# Patient Record
Sex: MALE | Race: WHITE | URBAN - METROPOLITAN AREA
[De-identification: names, ages, dates, MRNs, and addresses within clinical notes are randomized per-mention and may not be internally consistent; named-entity substitution may affect disease eponyms.]

---

## 2017-05-22 ENCOUNTER — EMERGENCY (EMERGENCY)
Facility: HOSPITAL | Age: 52
LOS: 0 days | Discharge: HOME | End: 2017-05-22

## 2017-06-28 DIAGNOSIS — R10.32 LEFT LOWER QUADRANT PAIN: ICD-10-CM

## 2017-06-28 DIAGNOSIS — N20.0 CALCULUS OF KIDNEY: ICD-10-CM

## 2017-06-28 DIAGNOSIS — Z79.899 OTHER LONG TERM (CURRENT) DRUG THERAPY: ICD-10-CM

## 2017-06-28 DIAGNOSIS — K21.9 GASTRO-ESOPHAGEAL REFLUX DISEASE WITHOUT ESOPHAGITIS: ICD-10-CM

## 2017-06-28 DIAGNOSIS — N23 UNSPECIFIED RENAL COLIC: ICD-10-CM

## 2018-07-26 ENCOUNTER — TRANSCRIPTION ENCOUNTER (OUTPATIENT)
Age: 53
End: 2018-07-26

## 2019-09-08 ENCOUNTER — INPATIENT (INPATIENT)
Facility: HOSPITAL | Age: 54
LOS: 1 days | Discharge: HOME | End: 2019-09-10
Attending: SURGERY | Admitting: SURGERY
Payer: COMMERCIAL

## 2019-09-08 VITALS
DIASTOLIC BLOOD PRESSURE: 137 MMHG | OXYGEN SATURATION: 98 % | TEMPERATURE: 97 F | SYSTOLIC BLOOD PRESSURE: 232 MMHG | HEART RATE: 88 BPM | RESPIRATION RATE: 18 BRPM | WEIGHT: 279.99 LBS

## 2019-09-08 DIAGNOSIS — Z90.89 ACQUIRED ABSENCE OF OTHER ORGANS: Chronic | ICD-10-CM

## 2019-09-08 DIAGNOSIS — Z98.890 OTHER SPECIFIED POSTPROCEDURAL STATES: Chronic | ICD-10-CM

## 2019-09-08 LAB
ALBUMIN SERPL ELPH-MCNC: 4.6 G/DL — SIGNIFICANT CHANGE UP (ref 3.5–5.2)
ALP SERPL-CCNC: 51 U/L — SIGNIFICANT CHANGE UP (ref 30–115)
ALT FLD-CCNC: 15 U/L — SIGNIFICANT CHANGE UP (ref 0–41)
ANION GAP SERPL CALC-SCNC: 12 MMOL/L — SIGNIFICANT CHANGE UP (ref 7–14)
APTT BLD: 33.1 SEC — SIGNIFICANT CHANGE UP (ref 27–39.2)
AST SERPL-CCNC: 13 U/L — SIGNIFICANT CHANGE UP (ref 0–41)
BASOPHILS # BLD AUTO: 0.08 K/UL — SIGNIFICANT CHANGE UP (ref 0–0.2)
BASOPHILS NFR BLD AUTO: 0.7 % — SIGNIFICANT CHANGE UP (ref 0–1)
BILIRUB DIRECT SERPL-MCNC: <0.2 MG/DL — SIGNIFICANT CHANGE UP (ref 0–0.2)
BILIRUB INDIRECT FLD-MCNC: SIGNIFICANT CHANGE UP MG/DL (ref 0.2–1.2)
BILIRUB SERPL-MCNC: <0.2 MG/DL — SIGNIFICANT CHANGE UP (ref 0.2–1.2)
BUN SERPL-MCNC: 15 MG/DL — SIGNIFICANT CHANGE UP (ref 10–20)
CALCIUM SERPL-MCNC: 9.5 MG/DL — SIGNIFICANT CHANGE UP (ref 8.5–10.1)
CHLORIDE SERPL-SCNC: 103 MMOL/L — SIGNIFICANT CHANGE UP (ref 98–110)
CO2 SERPL-SCNC: 27 MMOL/L — SIGNIFICANT CHANGE UP (ref 17–32)
CREAT SERPL-MCNC: 1.3 MG/DL — SIGNIFICANT CHANGE UP (ref 0.7–1.5)
EOSINOPHIL # BLD AUTO: 0.52 K/UL — SIGNIFICANT CHANGE UP (ref 0–0.7)
EOSINOPHIL NFR BLD AUTO: 4.8 % — SIGNIFICANT CHANGE UP (ref 0–8)
GLUCOSE SERPL-MCNC: 119 MG/DL — HIGH (ref 70–99)
HCT VFR BLD CALC: 46.4 % — SIGNIFICANT CHANGE UP (ref 42–52)
HGB BLD-MCNC: 15.9 G/DL — SIGNIFICANT CHANGE UP (ref 14–18)
IMM GRANULOCYTES NFR BLD AUTO: 0.2 % — SIGNIFICANT CHANGE UP (ref 0.1–0.3)
INR BLD: 1.07 RATIO — SIGNIFICANT CHANGE UP (ref 0.65–1.3)
LACTATE SERPL-SCNC: 1.5 MMOL/L — SIGNIFICANT CHANGE UP (ref 0.5–2.2)
LIDOCAIN IGE QN: 31 U/L — SIGNIFICANT CHANGE UP (ref 7–60)
LYMPHOCYTES # BLD AUTO: 3.8 K/UL — HIGH (ref 1.2–3.4)
LYMPHOCYTES # BLD AUTO: 35.4 % — SIGNIFICANT CHANGE UP (ref 20.5–51.1)
MCHC RBC-ENTMCNC: 29.7 PG — SIGNIFICANT CHANGE UP (ref 27–31)
MCHC RBC-ENTMCNC: 34.3 G/DL — SIGNIFICANT CHANGE UP (ref 32–37)
MCV RBC AUTO: 86.7 FL — SIGNIFICANT CHANGE UP (ref 80–94)
MONOCYTES # BLD AUTO: 0.95 K/UL — HIGH (ref 0.1–0.6)
MONOCYTES NFR BLD AUTO: 8.8 % — SIGNIFICANT CHANGE UP (ref 1.7–9.3)
NEUTROPHILS # BLD AUTO: 5.37 K/UL — SIGNIFICANT CHANGE UP (ref 1.4–6.5)
NEUTROPHILS NFR BLD AUTO: 50.1 % — SIGNIFICANT CHANGE UP (ref 42.2–75.2)
NRBC # BLD: 0 /100 WBCS — SIGNIFICANT CHANGE UP (ref 0–0)
PLATELET # BLD AUTO: 224 K/UL — SIGNIFICANT CHANGE UP (ref 130–400)
POTASSIUM SERPL-MCNC: 4.3 MMOL/L — SIGNIFICANT CHANGE UP (ref 3.5–5)
POTASSIUM SERPL-SCNC: 4.3 MMOL/L — SIGNIFICANT CHANGE UP (ref 3.5–5)
PROT SERPL-MCNC: 7.6 G/DL — SIGNIFICANT CHANGE UP (ref 6–8)
PROTHROM AB SERPL-ACNC: 12.3 SEC — SIGNIFICANT CHANGE UP (ref 9.95–12.87)
RBC # BLD: 5.35 M/UL — SIGNIFICANT CHANGE UP (ref 4.7–6.1)
RBC # FLD: 12.5 % — SIGNIFICANT CHANGE UP (ref 11.5–14.5)
SODIUM SERPL-SCNC: 142 MMOL/L — SIGNIFICANT CHANGE UP (ref 135–146)
WBC # BLD: 10.74 K/UL — SIGNIFICANT CHANGE UP (ref 4.8–10.8)
WBC # FLD AUTO: 10.74 K/UL — SIGNIFICANT CHANGE UP (ref 4.8–10.8)

## 2019-09-08 PROCEDURE — 71045 X-RAY EXAM CHEST 1 VIEW: CPT | Mod: 26

## 2019-09-08 PROCEDURE — 99285 EMERGENCY DEPT VISIT HI MDM: CPT

## 2019-09-08 PROCEDURE — 76705 ECHO EXAM OF ABDOMEN: CPT | Mod: 26

## 2019-09-08 RX ORDER — SODIUM CHLORIDE 9 MG/ML
1000 INJECTION INTRAMUSCULAR; INTRAVENOUS; SUBCUTANEOUS ONCE
Refills: 0 | Status: COMPLETED | OUTPATIENT
Start: 2019-09-08 | End: 2019-09-08

## 2019-09-08 RX ORDER — ONDANSETRON 8 MG/1
4 TABLET, FILM COATED ORAL ONCE
Refills: 0 | Status: COMPLETED | OUTPATIENT
Start: 2019-09-08 | End: 2019-09-08

## 2019-09-08 RX ORDER — MORPHINE SULFATE 50 MG/1
6 CAPSULE, EXTENDED RELEASE ORAL ONCE
Refills: 0 | Status: DISCONTINUED | OUTPATIENT
Start: 2019-09-08 | End: 2019-09-08

## 2019-09-08 RX ORDER — HYDROMORPHONE HYDROCHLORIDE 2 MG/ML
1 INJECTION INTRAMUSCULAR; INTRAVENOUS; SUBCUTANEOUS ONCE
Refills: 0 | Status: DISCONTINUED | OUTPATIENT
Start: 2019-09-08 | End: 2019-09-08

## 2019-09-08 RX ADMIN — MORPHINE SULFATE 6 MILLIGRAM(S): 50 CAPSULE, EXTENDED RELEASE ORAL at 22:35

## 2019-09-08 RX ADMIN — SODIUM CHLORIDE 1000 MILLILITER(S): 9 INJECTION INTRAMUSCULAR; INTRAVENOUS; SUBCUTANEOUS at 22:33

## 2019-09-08 RX ADMIN — HYDROMORPHONE HYDROCHLORIDE 1 MILLIGRAM(S): 2 INJECTION INTRAMUSCULAR; INTRAVENOUS; SUBCUTANEOUS at 23:22

## 2019-09-08 RX ADMIN — ONDANSETRON 4 MILLIGRAM(S): 8 TABLET, FILM COATED ORAL at 22:33

## 2019-09-08 NOTE — ED PROVIDER NOTE - NS ED ROS FT
Review of Systems  Constitutional:  No fever, chills.  Eyes:  No visual changes, eye pain, or discharge.  ENMT:  No hearing changes, pain, or discharge. No nasal congestion, discharge, or bleeding. No throat pain, swelling, or difficulty swallowing.  Cardiac:  No chest pain, palpitations, syncope, or edema.  Respiratory:  No dyspnea, cough. No hemoptysis.  GI:  No vomiting, diarrhea. No melena or hematochezia. (+) abd pain, nausea  :  No dysuria, hematuria, frequency, or burning.   MS:  No back pain.  Skin:  No skin rash, pruritis, jaundice, or lesions.  Neuro:  No headache, dizziness, loss of sensation, or focal weakness.  No change in mental status.   Endocrine: No history of thyroid disease or diabetes.

## 2019-09-08 NOTE — ED PROVIDER NOTE - PHYSICAL EXAMINATION
VITAL SIGNS: I have reviewed nursing notes and confirm.  CONSTITUTIONAL: Well-developed; well-nourished; in no acute distress.  SKIN: Skin exam is warm and dry, no acute rash.  HEAD: Normocephalic; atraumatic.  EYES: Conjunctiva and sclera clear.  ENT: No nasal discharge; airway clear.   NECK: Supple; non tender.  CARD: S1, S2 normal; no murmurs, gallops, or rubs. Regular rate and rhythm.  RESP: No wheezes, rales or rhonchi. Speaking in full sentences.   ABD: Normal bowel sounds; soft; non-distended; (+) RUQ TTP; No rebound or guarding. No CVA tenderness.  EXT: Normal ROM. No clubbing, cyanosis or edema.  NEURO: Alert, oriented. Grossly unremarkable. No focal deficits.

## 2019-09-08 NOTE — ED PROVIDER NOTE - CARE PLAN
Principal Discharge DX:	Abdominal pain  Secondary Diagnosis:	Cholelithiasis  Secondary Diagnosis:	Nausea

## 2019-09-08 NOTE — ED PROVIDER NOTE - CLINICAL SUMMARY MEDICAL DECISION MAKING FREE TEXT BOX
Labs unremarkable. EKG no acute changes. CXR negative. RUQ sono +gallstones, no wall thickening of pericholecystic fluid. Given IVF, morphine, Dilaudid with partial relief. Will admit. Surgery consulted.

## 2019-09-08 NOTE — ED PROVIDER NOTE - OBJECTIVE STATEMENT
55 yo M with PMHx of HTN, GERD and cholelithiasis presents to the ED c/o severe RUQ pain that started about 1 hour ago. Symptoms have been constant since onset. He admits to associated nausea. He denies aggravating/alleviating factors. Pt occasional drinker, denies eating heavy/fatty food prior to onset of symptoms. He had similar episode 2-3 years ago and was diagnosed with cholelithiasis--surgery was recommended but pt refused. He denies other complaints. Pt denies fever, chills, vomiting, diarrhea, headache, dizziness, weakness, chest pain, SOB, back pain, LOC, trauma, urinary symptoms, cough, calf pain/swelling, recent travel, recent surgery.

## 2019-09-08 NOTE — ED PROVIDER NOTE - ATTENDING CONTRIBUTION TO CARE
I personally evaluated the patient. I reviewed the Resident’s or Physician Assistant’s note (as assigned above), and agree with the findings and plan except as documented in my note.  Chart reviewed. H/O gallstones presents with RUQ pain and nausea for 1 week. No fever. Exam shows clear lungs, RR S1S2, abdomen soft +RUQ tenderness +BS, no rebound or guarding, no CCE.

## 2019-09-09 DIAGNOSIS — K80.20 CALCULUS OF GALLBLADDER WITHOUT CHOLECYSTITIS WITHOUT OBSTRUCTION: ICD-10-CM

## 2019-09-09 DIAGNOSIS — K21.9 GASTRO-ESOPHAGEAL REFLUX DISEASE WITHOUT ESOPHAGITIS: ICD-10-CM

## 2019-09-09 DIAGNOSIS — Z71.6 TOBACCO ABUSE COUNSELING: ICD-10-CM

## 2019-09-09 DIAGNOSIS — I10 ESSENTIAL (PRIMARY) HYPERTENSION: ICD-10-CM

## 2019-09-09 DIAGNOSIS — R10.13 EPIGASTRIC PAIN: ICD-10-CM

## 2019-09-09 LAB
ALBUMIN SERPL ELPH-MCNC: 4 G/DL — SIGNIFICANT CHANGE UP (ref 3.5–5.2)
ALP SERPL-CCNC: 58 U/L — SIGNIFICANT CHANGE UP (ref 30–115)
ALT FLD-CCNC: 41 U/L — SIGNIFICANT CHANGE UP (ref 0–41)
ANION GAP SERPL CALC-SCNC: 10 MMOL/L — SIGNIFICANT CHANGE UP (ref 7–14)
AST SERPL-CCNC: 35 U/L — SIGNIFICANT CHANGE UP (ref 0–41)
BILIRUB SERPL-MCNC: 0.4 MG/DL — SIGNIFICANT CHANGE UP (ref 0.2–1.2)
BUN SERPL-MCNC: 13 MG/DL — SIGNIFICANT CHANGE UP (ref 10–20)
CALCIUM SERPL-MCNC: 8.9 MG/DL — SIGNIFICANT CHANGE UP (ref 8.5–10.1)
CHLORIDE SERPL-SCNC: 104 MMOL/L — SIGNIFICANT CHANGE UP (ref 98–110)
CO2 SERPL-SCNC: 25 MMOL/L — SIGNIFICANT CHANGE UP (ref 17–32)
CREAT SERPL-MCNC: 1 MG/DL — SIGNIFICANT CHANGE UP (ref 0.7–1.5)
GLUCOSE SERPL-MCNC: 102 MG/DL — HIGH (ref 70–99)
HCT VFR BLD CALC: 44.1 % — SIGNIFICANT CHANGE UP (ref 42–52)
HGB BLD-MCNC: 14.9 G/DL — SIGNIFICANT CHANGE UP (ref 14–18)
LIDOCAIN IGE QN: 21 U/L — SIGNIFICANT CHANGE UP (ref 7–60)
MCHC RBC-ENTMCNC: 29.5 PG — SIGNIFICANT CHANGE UP (ref 27–31)
MCHC RBC-ENTMCNC: 33.8 G/DL — SIGNIFICANT CHANGE UP (ref 32–37)
MCV RBC AUTO: 87.3 FL — SIGNIFICANT CHANGE UP (ref 80–94)
NRBC # BLD: 0 /100 WBCS — SIGNIFICANT CHANGE UP (ref 0–0)
PLATELET # BLD AUTO: 192 K/UL — SIGNIFICANT CHANGE UP (ref 130–400)
POTASSIUM SERPL-MCNC: 4.2 MMOL/L — SIGNIFICANT CHANGE UP (ref 3.5–5)
POTASSIUM SERPL-SCNC: 4.2 MMOL/L — SIGNIFICANT CHANGE UP (ref 3.5–5)
PROT SERPL-MCNC: 6.8 G/DL — SIGNIFICANT CHANGE UP (ref 6–8)
RBC # BLD: 5.05 M/UL — SIGNIFICANT CHANGE UP (ref 4.7–6.1)
RBC # FLD: 12.7 % — SIGNIFICANT CHANGE UP (ref 11.5–14.5)
SODIUM SERPL-SCNC: 139 MMOL/L — SIGNIFICANT CHANGE UP (ref 135–146)
WBC # BLD: 7.88 K/UL — SIGNIFICANT CHANGE UP (ref 4.8–10.8)
WBC # FLD AUTO: 7.88 K/UL — SIGNIFICANT CHANGE UP (ref 4.8–10.8)

## 2019-09-09 PROCEDURE — 99223 1ST HOSP IP/OBS HIGH 75: CPT | Mod: AI

## 2019-09-09 PROCEDURE — 88304 TISSUE EXAM BY PATHOLOGIST: CPT | Mod: 26

## 2019-09-09 RX ORDER — NICOTINE POLACRILEX 2 MG
1 GUM BUCCAL DAILY
Refills: 0 | Status: DISCONTINUED | OUTPATIENT
Start: 2019-09-09 | End: 2019-09-10

## 2019-09-09 RX ORDER — HEPARIN SODIUM 5000 [USP'U]/ML
5000 INJECTION INTRAVENOUS; SUBCUTANEOUS EVERY 8 HOURS
Refills: 0 | Status: DISCONTINUED | OUTPATIENT
Start: 2019-09-09 | End: 2019-09-10

## 2019-09-09 RX ORDER — ONDANSETRON 8 MG/1
4 TABLET, FILM COATED ORAL ONCE
Refills: 0 | Status: COMPLETED | OUTPATIENT
Start: 2019-09-09 | End: 2019-09-09

## 2019-09-09 RX ORDER — OXYCODONE AND ACETAMINOPHEN 5; 325 MG/1; MG/1
1 TABLET ORAL ONCE
Refills: 0 | Status: DISCONTINUED | OUTPATIENT
Start: 2019-09-09 | End: 2019-09-09

## 2019-09-09 RX ORDER — ONDANSETRON 8 MG/1
4 TABLET, FILM COATED ORAL
Refills: 0 | Status: DISCONTINUED | OUTPATIENT
Start: 2019-09-09 | End: 2019-09-10

## 2019-09-09 RX ORDER — SODIUM CHLORIDE 9 MG/ML
1000 INJECTION, SOLUTION INTRAVENOUS
Refills: 0 | Status: DISCONTINUED | OUTPATIENT
Start: 2019-09-09 | End: 2019-09-09

## 2019-09-09 RX ORDER — NICOTINE POLACRILEX 2 MG
1 GUM BUCCAL DAILY
Refills: 0 | Status: DISCONTINUED | OUTPATIENT
Start: 2019-09-09 | End: 2019-09-09

## 2019-09-09 RX ORDER — LOSARTAN POTASSIUM 100 MG/1
25 TABLET, FILM COATED ORAL DAILY
Refills: 0 | Status: DISCONTINUED | OUTPATIENT
Start: 2019-09-09 | End: 2019-09-09

## 2019-09-09 RX ORDER — LANSOPRAZOLE 15 MG/1
1 CAPSULE, DELAYED RELEASE ORAL
Qty: 0 | Refills: 0 | DISCHARGE

## 2019-09-09 RX ORDER — HYDROMORPHONE HYDROCHLORIDE 2 MG/ML
1 INJECTION INTRAMUSCULAR; INTRAVENOUS; SUBCUTANEOUS
Refills: 0 | Status: DISCONTINUED | OUTPATIENT
Start: 2019-09-09 | End: 2019-09-10

## 2019-09-09 RX ORDER — ACETAMINOPHEN 500 MG
650 TABLET ORAL EVERY 6 HOURS
Refills: 0 | Status: DISCONTINUED | OUTPATIENT
Start: 2019-09-09 | End: 2019-09-10

## 2019-09-09 RX ORDER — MORPHINE SULFATE 50 MG/1
4 CAPSULE, EXTENDED RELEASE ORAL
Refills: 0 | Status: DISCONTINUED | OUTPATIENT
Start: 2019-09-09 | End: 2019-09-09

## 2019-09-09 RX ORDER — ONDANSETRON 8 MG/1
4 TABLET, FILM COATED ORAL
Refills: 0 | Status: DISCONTINUED | OUTPATIENT
Start: 2019-09-09 | End: 2019-09-09

## 2019-09-09 RX ORDER — PANTOPRAZOLE SODIUM 20 MG/1
40 TABLET, DELAYED RELEASE ORAL
Refills: 0 | Status: DISCONTINUED | OUTPATIENT
Start: 2019-09-09 | End: 2019-09-09

## 2019-09-09 RX ORDER — CEFOTETAN DISODIUM 1 G
1 VIAL (EA) INJECTION EVERY 12 HOURS
Refills: 0 | Status: DISCONTINUED | OUTPATIENT
Start: 2019-09-09 | End: 2019-09-10

## 2019-09-09 RX ORDER — OXYCODONE HYDROCHLORIDE 5 MG/1
5 TABLET ORAL EVERY 6 HOURS
Refills: 0 | Status: DISCONTINUED | OUTPATIENT
Start: 2019-09-09 | End: 2019-09-10

## 2019-09-09 RX ORDER — LOSARTAN POTASSIUM 100 MG/1
25 TABLET, FILM COATED ORAL DAILY
Refills: 0 | Status: DISCONTINUED | OUTPATIENT
Start: 2019-09-09 | End: 2019-09-10

## 2019-09-09 RX ORDER — PANTOPRAZOLE SODIUM 20 MG/1
40 TABLET, DELAYED RELEASE ORAL
Refills: 0 | Status: DISCONTINUED | OUTPATIENT
Start: 2019-09-09 | End: 2019-09-10

## 2019-09-09 RX ORDER — OLMESARTAN MEDOXOMIL 5 MG/1
0 TABLET, FILM COATED ORAL
Qty: 0 | Refills: 0 | DISCHARGE

## 2019-09-09 RX ORDER — SODIUM CHLORIDE 9 MG/ML
1000 INJECTION INTRAMUSCULAR; INTRAVENOUS; SUBCUTANEOUS
Refills: 0 | Status: DISCONTINUED | OUTPATIENT
Start: 2019-09-09 | End: 2019-09-09

## 2019-09-09 RX ORDER — IBUPROFEN 200 MG
400 TABLET ORAL EVERY 8 HOURS
Refills: 0 | Status: DISCONTINUED | OUTPATIENT
Start: 2019-09-09 | End: 2019-09-10

## 2019-09-09 RX ORDER — INFLUENZA VIRUS VACCINE 15; 15; 15; 15 UG/.5ML; UG/.5ML; UG/.5ML; UG/.5ML
0.5 SUSPENSION INTRAMUSCULAR ONCE
Refills: 0 | Status: COMPLETED | OUTPATIENT
Start: 2019-09-09 | End: 2019-09-09

## 2019-09-09 RX ORDER — SODIUM CHLORIDE 9 MG/ML
1000 INJECTION, SOLUTION INTRAVENOUS
Refills: 0 | Status: DISCONTINUED | OUTPATIENT
Start: 2019-09-09 | End: 2019-09-10

## 2019-09-09 RX ORDER — HYDROMORPHONE HYDROCHLORIDE 2 MG/ML
1 INJECTION INTRAMUSCULAR; INTRAVENOUS; SUBCUTANEOUS EVERY 4 HOURS
Refills: 0 | Status: DISCONTINUED | OUTPATIENT
Start: 2019-09-09 | End: 2019-09-09

## 2019-09-09 RX ADMIN — SODIUM CHLORIDE 125 MILLILITER(S): 9 INJECTION, SOLUTION INTRAVENOUS at 17:47

## 2019-09-09 RX ADMIN — SODIUM CHLORIDE 75 MILLILITER(S): 9 INJECTION INTRAMUSCULAR; INTRAVENOUS; SUBCUTANEOUS at 06:06

## 2019-09-09 RX ADMIN — HYDROMORPHONE HYDROCHLORIDE 1 MILLIGRAM(S): 2 INJECTION INTRAMUSCULAR; INTRAVENOUS; SUBCUTANEOUS at 17:25

## 2019-09-09 RX ADMIN — Medication 1 PATCH: at 11:18

## 2019-09-09 RX ADMIN — PANTOPRAZOLE SODIUM 40 MILLIGRAM(S): 20 TABLET, DELAYED RELEASE ORAL at 06:04

## 2019-09-09 RX ADMIN — Medication 30 MILLILITER(S): at 08:49

## 2019-09-09 RX ADMIN — HYDROMORPHONE HYDROCHLORIDE 1 MILLIGRAM(S): 2 INJECTION INTRAMUSCULAR; INTRAVENOUS; SUBCUTANEOUS at 01:00

## 2019-09-09 RX ADMIN — ONDANSETRON 4 MILLIGRAM(S): 8 TABLET, FILM COATED ORAL at 00:58

## 2019-09-09 RX ADMIN — LOSARTAN POTASSIUM 25 MILLIGRAM(S): 100 TABLET, FILM COATED ORAL at 06:04

## 2019-09-09 NOTE — H&P ADULT - NSHPLABSRESULTS_GEN_ALL_CORE
< from: US Abdomen Limited (09.08.19 @ 22:56) >    EXAM:  US ABDOMEN LIMITED          PROCEDURE DATE:  09/08/2019      < from: US Abdomen Limited (09.08.19 @ 22:56) >    IMPRESSION:    Cholelithiasis without gallbladder wall thickening, pericholecystic fluid   or additional grayscale imaging findings to suggest acute cholecystitis.   However a positive sonographic Post sign was reported, if further   imaging is needed, a HIDA scan can be obtained.    MARCIAL DAVENPORT, RESIDENT RADIOLOGIST  This document has been electronically signed.  MONTY ORNELAS M.D., ATTENDING RADIOLOGIST  This document has been electronically signed. Sep  8 2019 11:23PM      < end of copied text >                        15.9   10.74 )-----------( 224      ( 08 Sep 2019 22:30 )             46.4     09-08    142  |  103  |  15  ----------------------------<  119<H>  4.3   |  27  |  1.3    Ca    9.5      08 Sep 2019 22:30    TPro  7.6  /  Alb  4.6  /  TBili  <0.2  /  DBili  <0.2  /  AST  13  /  ALT  15  /  AlkPhos  51  09-08            PT/INR - ( 08 Sep 2019 22:30 )   PT: 12.30 sec;   INR: 1.07 ratio         PTT - ( 08 Sep 2019 22:30 )  PTT:33.1 sec  Lactate Trend  09-08 @ 22:30 Lactate:1.5         CAPILLARY BLOOD GLUCOSE

## 2019-09-09 NOTE — PROGRESS NOTE ADULT - ATTENDING COMMENTS
pt seen and examined independently.  pt w/o chest pain or sob, good exercise tolerance.  EKG reviewed; NSR 1st AVB, 64bpm, inf Q.  labs, CXR, vitals reviewed  medically optimized for surgery

## 2019-09-09 NOTE — PROGRESS NOTE ADULT - SUBJECTIVE AND OBJECTIVE BOX
CHIEF COMPLAINT: 54yMale with PMH below presented to the hospital for abdominal pain    Interval course: No event overnight. Patient was able to sleep well. Pain is well controlled w/ PRN medication. Denies fever/chills, n/v/d, chest pain, SOB, headache/dizziness.     PAST MEDICAL & SURGICAL HISTORY:  Gallstones  Hypertension  H/O shoulder surgery  History of tonsillectomy      FAMILY HISTORY:  [+] no pertinent family history of premature cardiovascular disease in first degree relatives.      SOCIAL HISTORY:    [+] Smoker: 1 ppd X 40 years  [+] Alcohol: Socially     Allergies: NKDA    	    REVIEW OF SYSTEMS:  CONSTITUTIONAL: No fever, weight loss, or fatigue  CARDIOLOGY: Patient denies chest pain, shortness of breath or syncopal episodes.   RESPIRATORY: denies shortness of breath, wheezing.   NEUROLOGICAL: No weakness, no focal deficits to report.  GI: no BRBPR, no n/v/d    PSYCHIATRY: normal mood and affect  HEENT: no nasal discharge, no ecchymosis  SKIN: no ecchymosis, no breakdown  MUSCULOSKELETAL: Full range of motion x4.     PHYSICAL EXAM:  T(C): 36.2 (09-09-19 @ 05:49), Max: 36.3 (09-08-19 @ 22:22)  HR: 66 (09-09-19 @ 05:49) (60 - 88)  BP: 115/66 (09-09-19 @ 05:49) (115/66 - 232/137)  RR: 16 (09-09-19 @ 05:49) (16 - 20)  SpO2: 98% (09-08-19 @ 23:23) (98% - 98%)      General Appearance: well appearing, normal for age and gender. 	  Neck: normal JVP, no bruit.   Eyes: EOMI   Cardiovascular: Normal S1S2 no rubs, murmurs, or gallops  Respiratory: Lungs clear to auscultation balta. No wheezing, rales, or rhonchi	  Psychiatry: Alert and oriented x 3, Mood & affect appropriate  Gastrointestinal:  Soft, Non-tender  Skin/Integumen: No rashes, No ecchymoses, No cyanosis	  Neurologic: Non-focal  Musculoskeletal/ extremities: Normal range of motion, No clubbing, cyanosis or edema  Vascular: Peripheral pulses palpable 2+ bilaterally    LABS: 	                          14.9   7.88  )-----------( 192      ( 09 Sep 2019 08:33 )             44.1     09-09    139  |  104  |  13  ----------------------------<  102<H>  4.2   |  25  |  1.0    Ca    8.9      09 Sep 2019 08:33    TPro  6.8  /  Alb  4.0  /  TBili  0.4  /  DBili  x   /  AST  35  /  ALT  41  /  AlkPhos  58  09-09        PT/INR - ( 08 Sep 2019 22:30 )   PT: 12.30 sec;   INR: 1.07 ratio         PTT - ( 08 Sep 2019 22:30 )  PTT:33.1 sec  	      ECG:    RADIOLOGY:   < from: Xray Chest 1 View- PORTABLE-Urgent (09.08.19 @ 23:21) >  Impression:      Under aerated lungs, with right basilar opacity. Differential includes   but not limited to atelectasis.    < end of copied text >    < from: US Abdomen Limited (09.08.19 @ 22:56) >  IMPRESSION:    Cholelithiasis without gallbladder wall thickening, pericholecystic fluid   or additional grayscale imaging findings to suggest acute cholecystitis.   However a positive sonographic Post sign was reported, if further   imaging is needed, a HIDA scan can be obtained.    < end of copied text >  	  	    Home Medications:  Benicar:  (09 Sep 2019 02:36)  Prevacid 30 mg oral delayed release capsule: 1 cap(s) orally once a day (09 Sep 2019 02:36)    MEDICATIONS  (STANDING):  influenza   Vaccine 0.5 milliLiter(s) IntraMuscular once  losartan 25 milliGRAM(s) Oral daily  nicotine - 21 mG/24Hr(s) Patch 1 patch Transdermal daily  pantoprazole    Tablet 40 milliGRAM(s) Oral before breakfast  sodium chloride 0.9%. 1000 milliLiter(s) (75 mL/Hr) IV Continuous <Continuous>    MEDICATIONS  (PRN):  aluminum hydroxide/magnesium hydroxide/simethicone Suspension 30 milliLiter(s) Oral every 4 hours PRN Dyspepsia  HYDROmorphone  Injectable 1 milliGRAM(s) IV Push every 4 hours PRN Moderate Pain (4 - 6)  ondansetron Injectable 4 milliGRAM(s) IV Push four times a day PRN Nausea and/or Vomiting

## 2019-09-09 NOTE — CONSULT NOTE ADULT - SUBJECTIVE AND OBJECTIVE BOX
Chief Complaint: abdominal pain.    · Chief Complaint: The patient is a 54y Male complaining of abdominal pain.	  · HPI Objective Statement: 55 yo M with PMHx of HTN, GERD and cholelithiasis presents to the ED c/o severe RUQ pain that started about 1 hour ago. Symptoms have been constant since onset. He admits to associated nausea. He denies aggravating/alleviating factors. Pt occasional drinker, denies eating heavy/fatty food prior to onset of symptoms. He had similar episode 2-3 years ago and was diagnosed with cholelithiasis--surgery was recommended but pt refused. He denies other complaints. Pt denies fever, chills, vomiting, diarrhea, headache, dizziness, weakness, chest pain, SOB, back pain, LOC, trauma, urinary symptoms, cough, calf pain/swelling, recent travel, recent surgery.	    HIV:    HIV Status:  · Offered: Declined	    PAST MEDICAL/SURGICAL/FAMILY/SOCIAL HISTORY:    Past Medical History:  Gallstones    Hypertension.     Past Surgical History:  H/O shoulder surgery    History of tonsillectomy.    ALLERGIES AND HOME MEDICATIONS:   Allergies:        Allergies:  	No Known Allergies:     Home Medications:   * Patient Currently Takes Medications as of 08-Sep-2019 22:27 documented in Structured Notes  · 	Prevacid 30 mg oral delayed release capsule: Last Dose Taken:  , 1 cap(s) orally once a day    REVIEW OF SYSTEMS:    Review of Systems:  · Review of Systems: Review of Systems  Constitutional:  No fever, chills.  Eyes:  No visual changes, eye pain, or discharge.  ENMT:  No hearing changes, pain, or discharge. No nasal congestion, discharge, or bleeding. No throat pain, swelling, or difficulty swallowing.  Cardiac:  No chest pain, palpitations, syncope, or edema.  Respiratory:  No dyspnea, cough. No hemoptysis.  GI:  No vomiting, diarrhea. No melena or hematochezia. (+) abd pain, nausea  :  No dysuria, hematuria, frequency, or burning.   MS:  No back pain.  Skin:  No skin rash, pruritis, jaundice, or lesions.  Neuro:  No headache, dizziness, loss of sensation, or focal weakness.  No change in mental status.  Endocrine: No history of thyroid disease or diabetes.	    PHYSICAL EXAM:   · Physical Examination: VITAL SIGNS: I have reviewed nursing notes and confirm.  CONSTITUTIONAL: Well-developed; well-nourished; in no acute distress.  SKIN: Skin exam is warm and dry, no acute rash.  HEAD: Normocephalic; atraumatic.  EYES: Conjunctiva and sclera clear.  ENT: No nasal discharge; airway clear.   NECK: Supple; non tender.  CARD: S1, S2 normal; no murmurs, gallops, or rubs. Regular rate and rhythm.  RESP: No wheezes, rales or rhonchi. Speaking in full sentences.   ABD: Normal bowel sounds; soft; non-distended; (+) RUQ TTP; No rebound or guarding. No CVA tenderness.  EXT: Normal ROM. No clubbing, cyanosis or edema. NEURO: Alert, oriented. Grossly unremarkable. No focal deficits.	    CURRENT ORDERS/:   · 	IV Insert,   Time/Priority:  STAT, 08-Sep-2019, Active, Standard    RESULTS:   · EKG Date/Time: 08-Sep-2019 23:37	  · Rate: 64	  · Interpretation: normal sinus rhythm	  · QRS: 96	  · ST/T Wave: no acute changes	                          15.9   10.74 )-----------( 224      ( 08 Sep 2019 22:30 )             46.4   09-08    142  |  103  |  15  ----------------------------<  119<H>  4.3   |  27  |  1.3    Ca    9.5      08 Sep 2019 22:30    TPro  7.6  /  Alb  4.6  /  TBili  <0.2  /  DBili  <0.2  /  AST  13  /  ALT  15  /  AlkPhos  51  09-08    EXAM:  US ABDOMEN LIMITED            PROCEDURE DATE:  09/08/2019            INTERPRETATION:  CLINICAL HISTORY: Right upper quadrant pain..    PROCEDURE: Ultrasound of the right upper quadrant was performed.    COMPARISON: None.    FINDINGS:    LIVER:  Normal in contour and echogenicity measuring  18 cm in length x   16.8 cm AP. No focal mass. No intrahepatic biliary ductal dilatation.     GALLBLADDER/BILIARY TREE: Cholelithiasis. Non-thickened gallbladder wall   measuring 2 mm. No pericholecystic fluid.  Positive reported sonographic   Post's sign. Non-dilated common bile duct measuring 6 mm.    PANCREAS: Obscured by overlying bowel gas.    KIDNEY:  Right kidney measures 9.8 cm in length. No hydronephrosis,   perinephric fluid, calculi orsolid mass is identified. Doppler flow   demonstrated.    AORTA/IVC:  Visualized proximal portions unremarkable.    ASCITES: No right upper quadrant ascites or right pleural effusion.    IMPRESSION:    Cholelithiasis without gallbladder wall thickening, pericholecystic fluid   or additional grayscale imaging findings to suggest acute cholecystitis.   However a positive sonographic Post sign was reported, if further   imaging is needed, a HIDA scan can be obtained.                    MARCIAL DAVENPORT, RESIDENT RADIOLOGIST  This document has been electronically signed.  MONTY ORNELAS M.D., ATTENDING RADIOLOGIST  This document has been electronically signed. Sep  8 2019 11:23PM
CARDIOLOGY CONSULT NOTE     CHIEF COMPLAINT/REASON FOR CONSULT:    HPI:  55yo male who has PMH of HTN, GERD and cholelithiasis presents to the ER due to severe epigastric pain which developed 1 hour prior to ER arrival. Associated with nausea but no fevers or vomiting ("my stomach was empty"). Some relief after receiving analgesics in the ER. Had previously advised to have surgery for his gall stones but he had refused (09 Sep 2019 02:49)      PAST MEDICAL & SURGICAL HISTORY:  Gallstones  Hypertension  H/O shoulder surgery  History of tonsillectomy      Cardiac Risks:   [x ]HTN, [ ] DM, [x ] Smoking, [ ] FH,  [ ] Lipids        MEDICATIONS:  MEDICATIONS  (STANDING):  influenza   Vaccine 0.5 milliLiter(s) IntraMuscular once  losartan 25 milliGRAM(s) Oral daily  nicotine - 21 mG/24Hr(s) Patch 1 patch Transdermal daily  pantoprazole    Tablet 40 milliGRAM(s) Oral before breakfast  sodium chloride 0.9%. 1000 milliLiter(s) (75 mL/Hr) IV Continuous <Continuous>      FAMILY HISTORY:      SOCIAL HISTORY:      [ ] Marital status   Allergies    No Known Allergies      	    REVIEW OF SYSTEMS:  CONSTITUTIONAL: No fever, weight loss, or fatigue  EYES: No eye pain, visual disturbances, or discharge  ENMT:  No difficulty hearing, tinnitus, vertigo; No sinus or throat pain  NECK: No pain or stiffness  RESPIRATORY: No cough, wheezing, chills or hemoptysis; No Shortness of Breath  CARDIOVASCULAR: No chest pain, palpitations, passing out, dizziness, or leg swelling  GASTROINTESTINAL: No abdominal or epigastric pain. No nausea, vomiting, or hematemesis; No diarrhea or constipation. No melena or hematochezia.  GENITOURINARY: No dysuria, frequency, hematuria, or incontinence  NEUROLOGICAL: No headaches, memory loss, loss of strength, numbness, or tremors  SKIN: No itching, burning, rashes, or lesions   	      PHYSICAL EXAM:  T(C): 36.2 (09-09-19 @ 05:49), Max: 36.3 (09-08-19 @ 22:22)  HR: 66 (09-09-19 @ 05:49) (60 - 88)  BP: 115/66 (09-09-19 @ 05:49) (115/66 - 232/137)  RR: 16 (09-09-19 @ 05:49) (16 - 20)  SpO2: 98% (09-08-19 @ 23:23) (98% - 98%)  Wt(kg): --  I&O's Summary      Appearance: Normal	  Psychiatry: A & O x 3, Mood & affect appropriate  HEENT:   Normal oral mucosa, PERRL, EOMI	  Lymphatic: No lymphadenopathy  Cardiovascular: Normal S1 S2,RRR, No JVD, No murmurs  Respiratory: Lungs clear to auscultation	Decreased bs  Gastrointestinal:  Soft, Non-tender, + BS	  Skin: No rashes, No ecchymoses, No cyanosis	  Neurologic: Non-focal  Extremities: Normal range of motion, No clubbing, cyanosis or edema  Vascular: Peripheral pulses palpable 2+ bilaterally      ECG:  	not available    	  LABS:	 	    CARDIAC MARKERS:                                    14.9   7.88  )-----------( 192      ( 09 Sep 2019 08:33 )             44.1     09-09    139  |  104  |  13  ----------------------------<  102<H>  4.2   |  25  |  1.0    Ca    8.9      09 Sep 2019 08:33    TPro  6.8  /  Alb  4.0  /  TBili  0.4  /  DBili  x   /  AST  35  /  ALT  41  /  AlkPhos  58  09-09    PT/INR - ( 08 Sep 2019 22:30 )   PT: 12.30 sec;   INR: 1.07 ratio         PTT - ( 08 Sep 2019 22:30 )  PTT:33.1 sec

## 2019-09-09 NOTE — PROGRESS NOTE ADULT - ASSESSMENT
55 y/o M pmh htn, presenting w/ RUQ apd pain X 1 night, improved w/ dilaudid/morphine, w/ abd. US consistent w/ symptomatic cholelithiasis     #Symptomatic cholelithiasis on admission  - Going to OR for lap abdulaziz pending pre-opclearance  - Patient is 4 METS w/ RCRI class II consistent w/ 6% risk of 30 day cardiovascular event. Low risk for moderate risk procedure   - Echo pending  - Pain controlled w/ morphine/dilaudid PRN  - Zofran for nausea prn  - NPO    #HTN  - c/w losartan 25 mg qday    #GERD  - c/w protonix    Code: full  Dispo: To home   GI ppx: Ppx  DVT ppx: Low risk     Plan: To OR this afternoon (Sept. 9th)

## 2019-09-09 NOTE — CONSULT NOTE ADULT - ASSESSMENT
Cholelithiasis   r/o cholecystitis  npo  ivf  iv abx  pain mgmt  HIDA SCAN  dvt/ gi prophylaxis  will follow
Patient denies angina or chf symptoms. He can walk several blocks or go up several flights steps. He snores. He has probable copd and georges. Please note for surgery . Risk surgery moderate. Check cxr ekg

## 2019-09-09 NOTE — CHART NOTE - NSCHARTNOTEFT_GEN_A_CORE
PACU ANESTHESIA ADMISSION NOTE      Procedure:   Post op diagnosis:      ____  Intubated  TV:______       Rate: ______      FiO2: ______    ___x_  Patent Airway    _x___  Full return of protective reflexes    ___x_  Full recovery from anesthesia / back to baseline status    Vitals:  T(C): 36.2 (09-09-19 @ 14:25), Max: 36.3 (09-08-19 @ 22:22)  HR: 66 (09-09-19 @ 14:25) (60 - 88)  BP: 115/66 (09-09-19 @ 14:25) (115/66 - 232/137)  RR: 16 (09-09-19 @ 14:25) (16 - 20)  SpO2: 97% (09-09-19 @ 14:25) (97% - 98%)    Mental Status:  _x___ Awake   _x___ Alert   _____ Drowsy   _____ Sedated    Nausea/Vomiting:  ____ NO  ___x___Yes,   See Post - Op Orders          Pain Scale (0-10):  _____    Treatment: ____ None    _x___ See Post - Op/PCA Orders    Post - Operative Fluids:   ____ Oral   ___x_ See Post - Op Orders    Plan: Discharge:   ___x_Home       _____Floor     _____Critical Care    _____  Other:_________________    Comments: uneventful anesthesia course no complications. VItals stable. Pt transferred to PACU

## 2019-09-09 NOTE — CHART NOTE - NSCHARTNOTEFT_GEN_A_CORE
post op note    pt seen and examnied at bed side  AO x3 , nad  surgical site c,d,i    PreOperative Diagnosis / Proposed Procedure:  · Preoperative Diagnosis: Cholecystitis / cholelithiasis  · Proposed Procedure: Lap abdulaziz      Pt tolerated procedure well

## 2019-09-09 NOTE — CONSULT NOTE ADULT - ATTENDING COMMENTS
above noted abdomen soft no distension tender RUQ h/o multiple attacks of ruq sonogram noted for surgery pt fully examined by me and agree with above

## 2019-09-09 NOTE — H&P ADULT - HISTORY OF PRESENT ILLNESS
55yo male who has PMH of HTN, GERD and cholelithiasis presents to the ER due to severe epigastric pain which developed 1 hour prior to ER arrival. Associated with nausea but no fevers or vomiting ("my stomach was empty"). Some relief after receiving analgesics in the ER. Had previously advised to have surgery for his gall stones but he had refused

## 2019-09-10 ENCOUNTER — TRANSCRIPTION ENCOUNTER (OUTPATIENT)
Age: 54
End: 2019-09-10

## 2019-09-10 VITALS
TEMPERATURE: 98 F | HEART RATE: 83 BPM | RESPIRATION RATE: 16 BRPM | DIASTOLIC BLOOD PRESSURE: 77 MMHG | SYSTOLIC BLOOD PRESSURE: 144 MMHG

## 2019-09-10 DIAGNOSIS — K81.9 CHOLECYSTITIS, UNSPECIFIED: ICD-10-CM

## 2019-09-10 LAB
ALBUMIN SERPL ELPH-MCNC: 4.2 G/DL — SIGNIFICANT CHANGE UP (ref 3.5–5.2)
ALP SERPL-CCNC: 74 U/L — SIGNIFICANT CHANGE UP (ref 30–115)
ALT FLD-CCNC: 51 U/L — HIGH (ref 0–41)
ANION GAP SERPL CALC-SCNC: 12 MMOL/L — SIGNIFICANT CHANGE UP (ref 7–14)
AST SERPL-CCNC: 37 U/L — SIGNIFICANT CHANGE UP (ref 0–41)
BASOPHILS # BLD AUTO: 0.02 K/UL — SIGNIFICANT CHANGE UP (ref 0–0.2)
BASOPHILS NFR BLD AUTO: 0.2 % — SIGNIFICANT CHANGE UP (ref 0–1)
BILIRUB DIRECT SERPL-MCNC: <0.2 MG/DL — SIGNIFICANT CHANGE UP (ref 0–0.2)
BILIRUB INDIRECT FLD-MCNC: >0.3 MG/DL — SIGNIFICANT CHANGE UP (ref 0.2–1.2)
BILIRUB SERPL-MCNC: 0.5 MG/DL — SIGNIFICANT CHANGE UP (ref 0.2–1.2)
BUN SERPL-MCNC: 14 MG/DL — SIGNIFICANT CHANGE UP (ref 10–20)
CALCIUM SERPL-MCNC: 9 MG/DL — SIGNIFICANT CHANGE UP (ref 8.5–10.1)
CHLORIDE SERPL-SCNC: 102 MMOL/L — SIGNIFICANT CHANGE UP (ref 98–110)
CO2 SERPL-SCNC: 25 MMOL/L — SIGNIFICANT CHANGE UP (ref 17–32)
CREAT SERPL-MCNC: 1.1 MG/DL — SIGNIFICANT CHANGE UP (ref 0.7–1.5)
EOSINOPHIL # BLD AUTO: 0.01 K/UL — SIGNIFICANT CHANGE UP (ref 0–0.7)
EOSINOPHIL NFR BLD AUTO: 0.1 % — SIGNIFICANT CHANGE UP (ref 0–8)
GLUCOSE SERPL-MCNC: 100 MG/DL — HIGH (ref 70–99)
HCT VFR BLD CALC: 46.9 % — SIGNIFICANT CHANGE UP (ref 42–52)
HCV AB S/CO SERPL IA: 13.05 S/CO — HIGH (ref 0–0.99)
HCV AB SERPL-IMP: REACTIVE
HGB BLD-MCNC: 15.9 G/DL — SIGNIFICANT CHANGE UP (ref 14–18)
IMM GRANULOCYTES NFR BLD AUTO: 0.4 % — HIGH (ref 0.1–0.3)
LYMPHOCYTES # BLD AUTO: 1.51 K/UL — SIGNIFICANT CHANGE UP (ref 1.2–3.4)
LYMPHOCYTES # BLD AUTO: 14.8 % — LOW (ref 20.5–51.1)
MAGNESIUM SERPL-MCNC: 2 MG/DL — SIGNIFICANT CHANGE UP (ref 1.8–2.4)
MCHC RBC-ENTMCNC: 29.2 PG — SIGNIFICANT CHANGE UP (ref 27–31)
MCHC RBC-ENTMCNC: 33.9 G/DL — SIGNIFICANT CHANGE UP (ref 32–37)
MCV RBC AUTO: 86.2 FL — SIGNIFICANT CHANGE UP (ref 80–94)
MONOCYTES # BLD AUTO: 0.68 K/UL — HIGH (ref 0.1–0.6)
MONOCYTES NFR BLD AUTO: 6.6 % — SIGNIFICANT CHANGE UP (ref 1.7–9.3)
NEUTROPHILS # BLD AUTO: 7.97 K/UL — HIGH (ref 1.4–6.5)
NEUTROPHILS NFR BLD AUTO: 77.9 % — HIGH (ref 42.2–75.2)
NRBC # BLD: 0 /100 WBCS — SIGNIFICANT CHANGE UP (ref 0–0)
PHOSPHATE SERPL-MCNC: 3.6 MG/DL — SIGNIFICANT CHANGE UP (ref 2.1–4.9)
PLATELET # BLD AUTO: 220 K/UL — SIGNIFICANT CHANGE UP (ref 130–400)
POTASSIUM SERPL-MCNC: 4.3 MMOL/L — SIGNIFICANT CHANGE UP (ref 3.5–5)
POTASSIUM SERPL-SCNC: 4.3 MMOL/L — SIGNIFICANT CHANGE UP (ref 3.5–5)
PROT SERPL-MCNC: 7.4 G/DL — SIGNIFICANT CHANGE UP (ref 6–8)
RBC # BLD: 5.44 M/UL — SIGNIFICANT CHANGE UP (ref 4.7–6.1)
RBC # FLD: 12.4 % — SIGNIFICANT CHANGE UP (ref 11.5–14.5)
SODIUM SERPL-SCNC: 139 MMOL/L — SIGNIFICANT CHANGE UP (ref 135–146)
WBC # BLD: 10.23 K/UL — SIGNIFICANT CHANGE UP (ref 4.8–10.8)
WBC # FLD AUTO: 10.23 K/UL — SIGNIFICANT CHANGE UP (ref 4.8–10.8)

## 2019-09-10 PROCEDURE — 99232 SBSQ HOSP IP/OBS MODERATE 35: CPT

## 2019-09-10 RX ORDER — IBUPROFEN 200 MG
1 TABLET ORAL
Qty: 0 | Refills: 0 | DISCHARGE
Start: 2019-09-10

## 2019-09-10 RX ADMIN — Medication 1 PATCH: at 11:15

## 2019-09-10 RX ADMIN — LOSARTAN POTASSIUM 25 MILLIGRAM(S): 100 TABLET, FILM COATED ORAL at 05:13

## 2019-09-10 RX ADMIN — Medication 100 GRAM(S): at 05:14

## 2019-09-10 RX ADMIN — PANTOPRAZOLE SODIUM 40 MILLIGRAM(S): 20 TABLET, DELAYED RELEASE ORAL at 05:15

## 2019-09-10 NOTE — PROGRESS NOTE ADULT - ASSESSMENT
Pt is a 55 y/o male s/p Lap Carolyne POD #1    Case D/W Dr Kc. Pt is to be advanced to low fat diet and discharged home today on Augmentin x 5 days and follow up in offcie on 9/17/19.

## 2019-09-10 NOTE — PROGRESS NOTE ADULT - SUBJECTIVE AND OBJECTIVE BOX
Pt seen and evaluated at bedside. He is s/p Lap Carolyne POD#1. He reports feeling well. Denies pain, nausea/vomting, or other complaints. Tolerating clear liquids. Voiding without difficulty.     Vital Signs Last 24 Hrs  T(C): 36.7 (10 Sep 2019 06:00), Max: 37.1 (09 Sep 2019 22:13)  T(F): 98.1 (10 Sep 2019 06:00), Max: 98.7 (09 Sep 2019 22:13)  HR: 83 (10 Sep 2019 06:00) (50 - 84)  BP: 144/77 (10 Sep 2019 06:00) (115/66 - 194/95)  BP(mean): --  RR: 16 (10 Sep 2019 06:00) (16 - 16)  SpO2: 96% (09 Sep 2019 17:52) (95% - 97%)    PE  Gen NAD, A&Ox3  Abd soft, minimal RUQ tenderness, no rebound, no guarding, Dressings C/D/I  Ext no Ct or LE edema                            15.9   10.23 )-----------( 220      ( 10 Sep 2019 07:38 )             46.9       09-10    139  |  102  |  14  ----------------------------<  100<H>  4.3   |  25  |  1.1    Ca    9.0      10 Sep 2019 07:38  Phos  3.6     09-10  Mg     2.0     09-10    TPro  7.4  /  Alb  4.2  /  TBili  0.5  /  DBili  <0.2  /  AST  37  /  ALT  51<H>  /  AlkPhos  74  09-10

## 2019-09-10 NOTE — DISCHARGE NOTE NURSING/CASE MANAGEMENT/SOCIAL WORK - NSDCPEWEB_GEN_ALL_CORE
[facility] for Tobacco Control website --- [email or web address] website --- [email or web address]

## 2019-09-10 NOTE — DISCHARGE NOTE NURSING/CASE MANAGEMENT/SOCIAL WORK - PATIENT PORTAL LINK FT
You can access the FollowMyHealth Patient Portal offered by [facility] by registering at the following website: [email or web address]. By joining [facility]’s FollowMyHealth portal, you will also be able to view your health information using other applications (apps) compatible with our system.

## 2019-09-10 NOTE — DISCHARGE NOTE NURSING/CASE MANAGEMENT/SOCIAL WORK - NSDCPEEMAIL_GEN_ALL_CORE
[facility] for Tobacco Control email [email or web address]

## 2019-09-10 NOTE — DISCHARGE NOTE PROVIDER - CARE PROVIDER_API CALL
Franco Kc)  Surgery  [address] [place] [address]  Phone: [phone]  Fax: [phone]  Follow Up Time:

## 2019-09-10 NOTE — DISCHARGE NOTE PROVIDER - NSDCCPCAREPLAN_GEN_ALL_CORE_FT
PRINCIPAL DISCHARGE DIAGNOSIS  Diagnosis: Cholecystitis  Assessment and Plan of Treatment: Augmentin x 5 days  Follow up with Dr Kc in 1 week 9/17/19  Return to ER with increased pain, nausea/vomiting, fever/chills      SECONDARY DISCHARGE DIAGNOSES  Diagnosis: Nausea  Assessment and Plan of Treatment:     Diagnosis: Cholelithiasis  Assessment and Plan of Treatment:

## 2019-09-10 NOTE — DISCHARGE NOTE PROVIDER - HOSPITAL COURSE
Pt is a 55 y/o male admitted with cholecystitis. He underwent cholecystectomy on 9/9/19, treated with IV abx and discharged home on POD #1 with Augmentin x 5days and instructed to follow up with Dr Kc in 1 week.

## 2019-09-11 LAB
HCV RNA FLD QL NAA+PROBE: SIGNIFICANT CHANGE UP
HCV RNA SPEC QL PROBE+SIG AMP: SIGNIFICANT CHANGE UP
SURGICAL PATHOLOGY STUDY: SIGNIFICANT CHANGE UP

## 2019-09-16 DIAGNOSIS — I10 ESSENTIAL (PRIMARY) HYPERTENSION: ICD-10-CM

## 2019-09-16 DIAGNOSIS — K80.00 CALCULUS OF GALLBLADDER WITH ACUTE CHOLECYSTITIS WITHOUT OBSTRUCTION: ICD-10-CM

## 2019-09-16 DIAGNOSIS — K66.8 OTHER SPECIFIED DISORDERS OF PERITONEUM: ICD-10-CM

## 2019-09-16 DIAGNOSIS — K21.9 GASTRO-ESOPHAGEAL REFLUX DISEASE WITHOUT ESOPHAGITIS: ICD-10-CM

## 2019-09-16 DIAGNOSIS — R10.9 UNSPECIFIED ABDOMINAL PAIN: ICD-10-CM

## 2019-09-16 DIAGNOSIS — Z72.89 OTHER PROBLEMS RELATED TO LIFESTYLE: ICD-10-CM

## 2019-09-16 DIAGNOSIS — F17.210 NICOTINE DEPENDENCE, CIGARETTES, UNCOMPLICATED: ICD-10-CM

## 2020-01-06 NOTE — ED ADULT TRIAGE NOTE - CHIEF COMPLAINT QUOTE
RUQ pain and nausea. does not take HTN medication everyday. 

## 2022-01-20 NOTE — DISCHARGE NOTE PROVIDER - PROVIDER TOKENS
[facility] Hospitalist Team  History and Physical       Assessment/Plan:     #Genearlized weakness - likely 2/2 covid infection  -no hypoxia or respiratory symptoms  -he was given MAB in er, planned to go home but was later admitted  -ID consulted no further beverly 10/21/2013    Procedure: LUMBAR EPIDURAL;  Surgeon: Ginger Samano MD;  Location: [address] [place] [address] GID & Nic Hernandez NDL/CATH SPI DX/THER Lonnie Eric Keira 82 N/A 8/8/2014    Procedure: LUMBAR EPIDURAL;  Surgeon: Ginger Samano MD;  Location: [facility] EPIDURAL/SUBARACHNOID; LUMBAR/SACRAL N/A 11/10/2014    Procedure: LUMBAR EPIDURAL;  Surgeon: Feroz Newman MD;  Location: [facility] FOR PAIN MANAGEMENT   • IR ANGIOGRAM CEREBRAL CAROTID UNILATERAL  2/7/2020        • IR INTRA ARTERIAL STROKE INTERVENTION CENTER FOR PAIN MANAGEMENT   • PATIENT [place] PREOPERATIVE ORDER FOR IV ANTIBIOTIC SURGICAL SITE INFECTION PROPHYLAXIS.   10/21/2013    Procedure: LUMBAR EPIDURAL;  Surgeon: Barbara Patel MD;  Location: [address] [place] [address] MANAGEMENT   • PATIENT WITHOU Capsule SR 24 Hr, Take 1 capsule (60 mg total) by mouth daily. famoTIDine 20 MG Oral Tab, Take 1 tablet (20 mg total) by mouth 2 (two) times daily. docusate sodium 100 MG Oral Cap, Take 100 mg by mouth daily as needed.   lidocaine 4 % External Patch, Plac polydipsia  HEMATOLOGICAL:  (-) easy bruising (-) bleeding    OBJECTIVE:  /50   Pulse 55   Temp 98.7 °F (37.1 °C) (Oral)   Resp 16   Ht 5' 7\" (1.702 m)   Wt 188 lb (85.3 kg)   SpO2 97%   BMI 29.44 kg/m²   General:  Alert, no distress, appears stated history at this point. FINDINGS:  Stable cardiac size. Bilateral basilar opacities, left greater than right. No pneumothorax. Atheromatous calcification of the aorta. No significant pleural effusions. Degenerative changes of the thoracic spine. PROVIDER:[TOKEN:[[id number]]]

## 2023-10-18 PROBLEM — K80.20 CALCULUS OF GALLBLADDER WITHOUT CHOLECYSTITIS WITHOUT OBSTRUCTION: Chronic | Status: ACTIVE | Noted: 2019-09-08

## 2023-10-18 PROBLEM — I10 ESSENTIAL (PRIMARY) HYPERTENSION: Chronic | Status: ACTIVE | Noted: 2019-09-08

## 2023-10-30 PROBLEM — Z00.00 ENCOUNTER FOR PREVENTIVE HEALTH EXAMINATION: Status: ACTIVE | Noted: 2023-10-30

## 2023-11-06 ENCOUNTER — APPOINTMENT (OUTPATIENT)
Dept: UROLOGY | Facility: CLINIC | Age: 58
End: 2023-11-06
Payer: MEDICARE

## 2023-11-06 ENCOUNTER — LABORATORY RESULT (OUTPATIENT)
Age: 58
End: 2023-11-06

## 2023-11-06 ENCOUNTER — NON-APPOINTMENT (OUTPATIENT)
Age: 58
End: 2023-11-06

## 2023-11-06 VITALS
HEART RATE: 72 BPM | BODY MASS INDEX: 33.86 KG/M2 | SYSTOLIC BLOOD PRESSURE: 146 MMHG | HEIGHT: 72 IN | TEMPERATURE: 98 F | DIASTOLIC BLOOD PRESSURE: 96 MMHG | WEIGHT: 250 LBS

## 2023-11-06 DIAGNOSIS — Z78.9 OTHER SPECIFIED HEALTH STATUS: ICD-10-CM

## 2023-11-06 DIAGNOSIS — N50.819 TESTICULAR PAIN, UNSPECIFIED: ICD-10-CM

## 2023-11-06 DIAGNOSIS — Z87.891 PERSONAL HISTORY OF NICOTINE DEPENDENCE: ICD-10-CM

## 2023-11-06 DIAGNOSIS — I10 ESSENTIAL (PRIMARY) HYPERTENSION: ICD-10-CM

## 2023-11-06 LAB
BILIRUB UR QL STRIP: NORMAL
COLLECTION METHOD: NORMAL
GLUCOSE UR-MCNC: NORMAL
HCG UR QL: 0.2 EU/DL
HGB UR QL STRIP.AUTO: NORMAL
KETONES UR-MCNC: NORMAL
LEUKOCYTE ESTERASE UR QL STRIP: NORMAL
NITRITE UR QL STRIP: NORMAL
PH UR STRIP: 5.5
PROT UR STRIP-MCNC: 30
SP GR UR STRIP: 1.03

## 2023-11-06 PROCEDURE — 99204 OFFICE O/P NEW MOD 45 MIN: CPT

## 2023-11-06 PROCEDURE — 81003 URINALYSIS AUTO W/O SCOPE: CPT | Mod: QW

## 2023-11-06 RX ORDER — AMLODIPINE AND ATORVASTATIN 2.5; 4 MG/1; MG/1
TABLET, COATED ORAL
Refills: 0 | Status: ACTIVE | COMMUNITY

## 2023-11-10 ENCOUNTER — NON-APPOINTMENT (OUTPATIENT)
Age: 58
End: 2023-11-10

## 2023-11-10 LAB — BACTERIA UR CULT: NORMAL

## 2023-12-01 ENCOUNTER — RESULT REVIEW (OUTPATIENT)
Age: 58
End: 2023-12-01

## 2023-12-01 ENCOUNTER — OUTPATIENT (OUTPATIENT)
Dept: OUTPATIENT SERVICES | Facility: HOSPITAL | Age: 58
LOS: 1 days | End: 2023-12-01
Payer: MEDICARE

## 2023-12-01 DIAGNOSIS — Z90.89 ACQUIRED ABSENCE OF OTHER ORGANS: Chronic | ICD-10-CM

## 2023-12-01 DIAGNOSIS — Z98.890 OTHER SPECIFIED POSTPROCEDURAL STATES: Chronic | ICD-10-CM

## 2023-12-01 DIAGNOSIS — N50.819 TESTICULAR PAIN, UNSPECIFIED: ICD-10-CM

## 2023-12-01 DIAGNOSIS — Z00.8 ENCOUNTER FOR OTHER GENERAL EXAMINATION: ICD-10-CM

## 2023-12-01 PROCEDURE — 74176 CT ABD & PELVIS W/O CONTRAST: CPT | Mod: 26

## 2023-12-01 PROCEDURE — 74176 CT ABD & PELVIS W/O CONTRAST: CPT

## 2023-12-02 DIAGNOSIS — N50.819 TESTICULAR PAIN, UNSPECIFIED: ICD-10-CM

## 2023-12-19 ENCOUNTER — APPOINTMENT (OUTPATIENT)
Dept: UROLOGY | Facility: CLINIC | Age: 58
End: 2023-12-19
Payer: MEDICARE

## 2023-12-19 VITALS
WEIGHT: 250 LBS | RESPIRATION RATE: 18 BRPM | HEIGHT: 72 IN | SYSTOLIC BLOOD PRESSURE: 141 MMHG | HEART RATE: 70 BPM | TEMPERATURE: 97.4 F | OXYGEN SATURATION: 98 % | DIASTOLIC BLOOD PRESSURE: 101 MMHG | BODY MASS INDEX: 33.86 KG/M2

## 2023-12-19 DIAGNOSIS — R39.15 URGENCY OF URINATION: ICD-10-CM

## 2023-12-19 DIAGNOSIS — R35.0 FREQUENCY OF MICTURITION: ICD-10-CM

## 2023-12-19 DIAGNOSIS — N20.0 CALCULUS OF KIDNEY: ICD-10-CM

## 2023-12-19 DIAGNOSIS — I71.40 ABDOMINAL AORTIC ANEURYSM, WITHOUT RUPTURE, UNSPECIFIED: ICD-10-CM

## 2023-12-19 PROCEDURE — 99214 OFFICE O/P EST MOD 30 MIN: CPT

## 2023-12-19 NOTE — HISTORY OF PRESENT ILLNESS
[FreeTextEntry1] : KATIE PATRICK is a 58-year-old male with hx of nephrolithiasis not requiring intervention 2017, presents with sudden worsening LUTS and right flank pain, started on Flomax by PCP.   Currently doing well denies resolution of his pain or discomfort.  Has discontinued tamsulosin.  Voiding well off of tamsulosin currently without any complaints.  States his symptoms mostly have resolved.Denies any further testicular pain He states aneurysm was much smaller previously in the 2 range  CT ap, from 12/19/2023, is negative for any evidence of renal stones.  An abdominal aortic aneurysm, 3.7 cm, new since prior exam noted. Images visualized and I agree with the findings also no hydronephrosis  PSA, from 11/9/2023, is 0.5 within normal limits.  Previously:  history: had prior kidney stone where he passed stone himself, requiring no medical intervention. Denies recurrent UTIs. Family History: denies  malignancies. Social History: Spanish speaking, former  now on disability  Old records reviewed: CT AP IC 05/22/2017 - images independently reviewed by me IMPRESSION: Mild left-sided hydroureter extends to the level of an obstructing 4 x 3 x 4 mm left distal ureteral calculus (approximately 800 Hounsfield units). Cholelithiasis.  PSA 0.3 2023

## 2023-12-19 NOTE — ASSESSMENT
[FreeTextEntry1] : KATIE PATRICK is a 58-year-old male with hx of nephrolithiasis not requiring intervention 2017, presents with sudden worsening LUTS and right flank pain, started on Flomax by PCP.  Pain has now resolved.  Off of tamsulosin.  CT scan negative for stones bilaterally.  No evidence of calculi and bladder.  CT demonstrates 3.7 cm abdominal aortic aneurysm.  PSA 0.5 and well within normal limits.  Plan: -Follow-up vascular surgery, contact information provided.  He understands importance of follow-up regarding this issue of AAA -Watchful waiting of stable chronic lower urinary tract symptoms.  He prefers being off medication at this time -Follow-up 1 year PSA and renal/bladder ultrasound.  Will reassess chronic nephrolithiasis

## 2023-12-19 NOTE — PHYSICAL EXAM
[Normal Appearance] : normal appearance [Well Groomed] : well groomed [General Appearance - In No Acute Distress] : no acute distress [Edema] : no peripheral edema [Respiration, Rhythm And Depth] : normal respiratory rhythm and effort [Exaggerated Use Of Accessory Muscles For Inspiration] : no accessory muscle use [Normal Station and Gait] : the gait and station were normal for the patient's age [] : no rash [No Focal Deficits] : no focal deficits [Oriented To Time, Place, And Person] : oriented to person, place, and time [Affect] : the affect was normal [Mood] : the mood was normal

## 2024-11-18 ENCOUNTER — APPOINTMENT (OUTPATIENT)
Dept: CARDIOLOGY | Facility: CLINIC | Age: 59
End: 2024-11-18
Payer: MEDICARE

## 2024-11-18 ENCOUNTER — NON-APPOINTMENT (OUTPATIENT)
Age: 59
End: 2024-11-18

## 2024-11-18 VITALS
HEART RATE: 64 BPM | RESPIRATION RATE: 18 BRPM | DIASTOLIC BLOOD PRESSURE: 80 MMHG | SYSTOLIC BLOOD PRESSURE: 100 MMHG | HEIGHT: 72 IN | OXYGEN SATURATION: 98 % | BODY MASS INDEX: 34.54 KG/M2 | WEIGHT: 255 LBS

## 2024-11-18 DIAGNOSIS — I71.40 ABDOMINAL AORTIC ANEURYSM, WITHOUT RUPTURE, UNSPECIFIED: ICD-10-CM

## 2024-11-18 DIAGNOSIS — I10 ESSENTIAL (PRIMARY) HYPERTENSION: ICD-10-CM

## 2024-11-18 DIAGNOSIS — N20.0 CALCULUS OF KIDNEY: ICD-10-CM

## 2024-11-18 DIAGNOSIS — R06.09 OTHER FORMS OF DYSPNEA: ICD-10-CM

## 2024-11-18 PROCEDURE — 99204 OFFICE O/P NEW MOD 45 MIN: CPT

## 2024-11-18 RX ORDER — ROSUVASTATIN CALCIUM 5 MG/1
5 TABLET, FILM COATED ORAL DAILY
Refills: 0 | Status: ACTIVE | COMMUNITY

## 2024-11-18 RX ORDER — METOPROLOL TARTRATE 50 MG/1
50 TABLET ORAL DAILY
Refills: 0 | Status: ACTIVE | COMMUNITY

## 2024-11-18 RX ORDER — VALSARTAN AND HYDROCHLOROTHIAZIDE 160; 25 MG/1; MG/1
160-25 TABLET, FILM COATED ORAL DAILY
Refills: 0 | Status: ACTIVE | COMMUNITY

## 2024-11-21 PROBLEM — R06.09 DYSPNEA ON EXERTION: Status: ACTIVE | Noted: 2024-11-21

## 2024-12-02 ENCOUNTER — APPOINTMENT (OUTPATIENT)
Dept: CARDIOLOGY | Facility: CLINIC | Age: 59
End: 2024-12-02

## 2024-12-02 PROCEDURE — ZZZZZ: CPT

## 2024-12-02 PROCEDURE — 93975 VASCULAR STUDY: CPT

## 2024-12-03 NOTE — ED ADULT TRIAGE NOTE - WEIGHT IN KG
Diet, Regular:   Consistent Carbohydrate {No Snacks} (CSTCHO)  DASH/TLC {Sodium & Cholesterol Restricted} (DASH) (12-01-24 @ 23:40) [Active]      
127

## 2024-12-09 ENCOUNTER — NON-APPOINTMENT (OUTPATIENT)
Age: 59
End: 2024-12-09

## 2024-12-17 ENCOUNTER — APPOINTMENT (OUTPATIENT)
Dept: CARDIOLOGY | Facility: CLINIC | Age: 59
End: 2024-12-17
Payer: MEDICARE

## 2024-12-17 PROCEDURE — 93925 LOWER EXTREMITY STUDY: CPT

## 2024-12-17 PROCEDURE — 93923 UPR/LXTR ART STDY 3+ LVLS: CPT

## 2024-12-17 PROCEDURE — 93306 TTE W/DOPPLER COMPLETE: CPT

## 2024-12-19 ENCOUNTER — APPOINTMENT (OUTPATIENT)
Dept: UROLOGY | Facility: CLINIC | Age: 59
End: 2024-12-19
Payer: MEDICARE

## 2024-12-19 VITALS
SYSTOLIC BLOOD PRESSURE: 120 MMHG | RESPIRATION RATE: 18 BRPM | HEIGHT: 72 IN | BODY MASS INDEX: 34.54 KG/M2 | DIASTOLIC BLOOD PRESSURE: 83 MMHG | WEIGHT: 255 LBS | HEART RATE: 79 BPM | OXYGEN SATURATION: 97 %

## 2024-12-19 DIAGNOSIS — R35.1 NOCTURIA: ICD-10-CM

## 2024-12-19 DIAGNOSIS — R35.0 FREQUENCY OF MICTURITION: ICD-10-CM

## 2024-12-19 DIAGNOSIS — R39.15 URGENCY OF URINATION: ICD-10-CM

## 2024-12-19 DIAGNOSIS — N20.0 CALCULUS OF KIDNEY: ICD-10-CM

## 2024-12-19 PROCEDURE — 99214 OFFICE O/P EST MOD 30 MIN: CPT

## 2024-12-20 ENCOUNTER — RESULT REVIEW (OUTPATIENT)
Age: 59
End: 2024-12-20

## 2024-12-20 ENCOUNTER — OUTPATIENT (OUTPATIENT)
Dept: OUTPATIENT SERVICES | Facility: HOSPITAL | Age: 59
LOS: 1 days | End: 2024-12-20
Payer: MEDICARE

## 2024-12-20 DIAGNOSIS — I10 ESSENTIAL (PRIMARY) HYPERTENSION: ICD-10-CM

## 2024-12-20 DIAGNOSIS — Z00.8 ENCOUNTER FOR OTHER GENERAL EXAMINATION: ICD-10-CM

## 2024-12-20 DIAGNOSIS — Z90.89 ACQUIRED ABSENCE OF OTHER ORGANS: Chronic | ICD-10-CM

## 2024-12-20 DIAGNOSIS — Z98.890 OTHER SPECIFIED POSTPROCEDURAL STATES: Chronic | ICD-10-CM

## 2024-12-20 LAB
BILIRUB UR QL STRIP: NORMAL
COLLECTION METHOD: NORMAL
GLUCOSE UR-MCNC: NORMAL
HCG UR QL: NORMAL EU/DL
HGB UR QL STRIP.AUTO: NORMAL
KETONES UR-MCNC: NORMAL
LEUKOCYTE ESTERASE UR QL STRIP: NORMAL
NITRITE UR QL STRIP: NORMAL
PH UR STRIP: 6
PROT UR STRIP-MCNC: NORMAL
SP GR UR STRIP: >=1.03

## 2024-12-20 PROCEDURE — 75574 CT ANGIO HRT W/3D IMAGE: CPT | Mod: 26

## 2024-12-20 PROCEDURE — 75574 CT ANGIO HRT W/3D IMAGE: CPT

## 2024-12-21 DIAGNOSIS — I10 ESSENTIAL (PRIMARY) HYPERTENSION: ICD-10-CM

## 2024-12-23 ENCOUNTER — APPOINTMENT (OUTPATIENT)
Dept: CARDIOLOGY | Facility: CLINIC | Age: 59
End: 2024-12-23

## 2024-12-23 ENCOUNTER — APPOINTMENT (OUTPATIENT)
Dept: CARDIOLOGY | Facility: CLINIC | Age: 59
End: 2024-12-23
Payer: MEDICARE

## 2024-12-23 VITALS
BODY MASS INDEX: 34.54 KG/M2 | SYSTOLIC BLOOD PRESSURE: 110 MMHG | HEART RATE: 66 BPM | HEIGHT: 72 IN | WEIGHT: 255 LBS | RESPIRATION RATE: 18 BRPM | DIASTOLIC BLOOD PRESSURE: 80 MMHG | OXYGEN SATURATION: 97 %

## 2024-12-23 DIAGNOSIS — I10 ESSENTIAL (PRIMARY) HYPERTENSION: ICD-10-CM

## 2024-12-23 DIAGNOSIS — I25.10 ATHEROSCLEROTIC HEART DISEASE OF NATIVE CORONARY ARTERY W/OUT ANGINA PECTORIS: ICD-10-CM

## 2024-12-23 DIAGNOSIS — R06.09 OTHER FORMS OF DYSPNEA: ICD-10-CM

## 2024-12-23 DIAGNOSIS — I71.40 ABDOMINAL AORTIC ANEURYSM, WITHOUT RUPTURE, UNSPECIFIED: ICD-10-CM

## 2024-12-23 DIAGNOSIS — Z01.818 ENCOUNTER FOR OTHER PREPROCEDURAL EXAMINATION: ICD-10-CM

## 2024-12-23 PROCEDURE — 99214 OFFICE O/P EST MOD 30 MIN: CPT

## 2024-12-24 ENCOUNTER — NON-APPOINTMENT (OUTPATIENT)
Age: 59
End: 2024-12-24

## 2024-12-24 LAB — BACTERIA UR CULT: NORMAL

## 2024-12-26 RX ORDER — VALSARTAN 160 MG/1
160 TABLET, COATED ORAL
Qty: 90 | Refills: 1 | Status: ACTIVE | COMMUNITY
Start: 2024-12-23 | End: 1900-01-01

## 2024-12-26 RX ORDER — ROSUVASTATIN CALCIUM 20 MG/1
20 TABLET, FILM COATED ORAL DAILY
Qty: 90 | Refills: 1 | Status: ACTIVE | COMMUNITY
Start: 2024-12-23 | End: 1900-01-01

## 2024-12-26 RX ORDER — CLOPIDOGREL BISULFATE 75 MG/1
75 TABLET, FILM COATED ORAL
Qty: 90 | Refills: 3 | Status: ACTIVE | COMMUNITY
Start: 2024-12-23 | End: 1900-01-01

## 2024-12-27 ENCOUNTER — OUTPATIENT (OUTPATIENT)
Dept: OUTPATIENT SERVICES | Facility: HOSPITAL | Age: 59
LOS: 1 days | End: 2024-12-27
Payer: MEDICARE

## 2024-12-27 ENCOUNTER — RESULT REVIEW (OUTPATIENT)
Age: 59
End: 2024-12-27

## 2024-12-27 DIAGNOSIS — R93.1 ABNORMAL FINDINGS ON DIAGNOSTIC IMAGING OF HEART AND CORONARY CIRCULATION: ICD-10-CM

## 2024-12-27 DIAGNOSIS — Z98.890 OTHER SPECIFIED POSTPROCEDURAL STATES: Chronic | ICD-10-CM

## 2024-12-27 DIAGNOSIS — Z90.89 ACQUIRED ABSENCE OF OTHER ORGANS: Chronic | ICD-10-CM

## 2024-12-27 PROCEDURE — 75580 N-INVAS EST C FFR SW ALY CTA: CPT | Mod: 26

## 2024-12-27 PROCEDURE — 75580 N-INVAS EST C FFR SW ALY CTA: CPT

## 2024-12-28 DIAGNOSIS — R93.1 ABNORMAL FINDINGS ON DIAGNOSTIC IMAGING OF HEART AND CORONARY CIRCULATION: ICD-10-CM

## 2024-12-31 LAB
ABO + RH PNL BLD: NORMAL
ALBUMIN SERPL ELPH-MCNC: 4.6 G/DL
ALP BLD-CCNC: 53 U/L
ALT SERPL-CCNC: 24 U/L
ANION GAP SERPL CALC-SCNC: 9 MMOL/L
APTT BLD: 30.7 SEC
AST SERPL-CCNC: 27 U/L
BASOPHILS # BLD AUTO: 0.05 K/UL
BASOPHILS NFR BLD AUTO: 0.6 %
BILIRUB SERPL-MCNC: 0.4 MG/DL
BUN SERPL-MCNC: 18 MG/DL
CALCIUM SERPL-MCNC: 9.4 MG/DL
CHLORIDE SERPL-SCNC: 100 MMOL/L
CO2 SERPL-SCNC: 29 MMOL/L
CREAT SERPL-MCNC: 1.3 MG/DL
EGFR: 62 ML/MIN/1.73M2
EOSINOPHIL # BLD AUTO: 0.42 K/UL
EOSINOPHIL NFR BLD AUTO: 5.3 %
GLUCOSE SERPL-MCNC: 105 MG/DL
HCT VFR BLD CALC: 48.5 %
HGB BLD-MCNC: 16.1 G/DL
IMM GRANULOCYTES NFR BLD AUTO: 0.3 %
INR PPP: 1.02 RATIO
LYMPHOCYTES # BLD AUTO: 2.45 K/UL
LYMPHOCYTES NFR BLD AUTO: 30.9 %
MAGNESIUM SERPL-MCNC: 2.2 MG/DL
MAN DIFF?: NORMAL
MCHC RBC-ENTMCNC: 29.3 PG
MCHC RBC-ENTMCNC: 33.2 G/DL
MCV RBC AUTO: 88.3 FL
MONOCYTES # BLD AUTO: 0.81 K/UL
MONOCYTES NFR BLD AUTO: 10.2 %
NEUTROPHILS # BLD AUTO: 4.19 K/UL
NEUTROPHILS NFR BLD AUTO: 52.7 %
PLATELET # BLD AUTO: 242 K/UL
PMV BLD AUTO: 0 /100 WBCS
POTASSIUM SERPL-SCNC: 4.3 MMOL/L
PROT SERPL-MCNC: 7.6 G/DL
PT BLD: 12 SEC
RBC # BLD: 5.49 M/UL
RBC # FLD: 12.1 %
SODIUM SERPL-SCNC: 138 MMOL/L
TSH SERPL-ACNC: 2.67 UIU/ML
WBC # FLD AUTO: 7.94 K/UL

## 2025-01-06 VITALS
HEIGHT: 73 IN | SYSTOLIC BLOOD PRESSURE: 148 MMHG | DIASTOLIC BLOOD PRESSURE: 89 MMHG | HEART RATE: 64 BPM | OXYGEN SATURATION: 97 % | WEIGHT: 255.07 LBS | RESPIRATION RATE: 17 BRPM

## 2025-01-06 NOTE — H&P CARDIOLOGY - HISTORY OF PRESENT ILLNESS
Patient is a 59y Male PMH of HTN, HLD and nephrolithiasis requiring prior intervention, AAA without prior surgical intervention, obesity, former smoker (smoked for 40 years, quit 3 years ago), chronic back pain. Patient reports SOB when going up 2 flights of stairs or when walking at a fast pace. CCTA revealed CAC of 847 with multivessel disease and presents to cardiology department for LHC and possible intervention.    < from: CT Fractional Flow Reserve (FFR-CT) Non-Invasive w/ Interpretation & Report (12.27.24 @ 08:14) >  FINDINGS:  Left Main: FFR CT extending to the distal segment is 0.57 which is   consistent with a hemodynamically significant lesion.    LAD: FFR CT extending to the distal segment is <0.50.    LCX:FFR CT extending to the distal segment is 0.55.    RCA:FFR CT extending to the distal segment is <0.50 which is consistent   with a hemodynamically significant lesion.    < from: CT Angio Heart and Coronaries w/ IV Cont (12.20.24 @ 08:46) >  IMPRESSION:    -Diffuse multivesselcoronary artery disease.  -Moderate stenosis in the distal LM due to calcified plaque.  -Moderate stenosis in the proximal to mid LAD due to calcified plaque.  -Moderate stenosis in the LCx due to scattered calcified plaque.  -Severe stenosis in the proximal to mid RCA due to mixed plaque.  -The total Agatston coronary artery calcium score equals 847, which   corresponds to 95th percentile for age, gender and ethnicity.  -CAD-RADS 4B.    Pre cath note:  indication:  [ ] STEMI                [ ] NSTEMI                 [ ] Acute coronary syndrome                   [ ]Unstable Angina   [ ] high risk  [ ] intermediate risk  [ ] low risk                   [X ] Stable Angina     non-invasive testing:     CCTA                     Date:  12/20/2024                   result: [ ] high risk  [X ] intermediate risk  [ ] low risk    Anti- Anginal medications:                    [ ] not used d/t                     [ ] used   ( ) BB     (X ) CCB      ( ) Nitrate   (  ) Ranexa          [ ] not used but strong indication not to use    Ejection Fraction                   [ ] <29            [ ] 30-39%   [ ] 40-49%     [X ]>50%    CHF          [ ] active (within last 14 days on meds   [ ] Chronic (on meds but no exacerbation)  NYHA Functional Class:  (  ) Class I (no limitations)  (  ) Class II (slight limitation)  (  ) Class III (marked limitation)  (  ) Class IV (symptoms at rest)    COPD                   [ ] mild (on chronic bronchodilators)  [ ] moderate (on chronic steroid therapy)      [ ] severe (indication for home O2 or PACO2 >50)    Other risk factors:                     [ ] Previous MI                     [ ] CVA/ stroke                    [ ] carotid stent/ CEA                    [ ] PVD/PAD- (arterial aneurysm, non-palpable pulses, tortuous vessel with inability to insert catheter, infra-renal dissection, renal or subclavian artery stenosis)                    [ ] previous CABG                    [ ] Renal Failure:  on HD  (  ) yes  (  ) no                    [ ] Diabetic  (  ) Type 1  (  ) Type 2                                         (  ) Insulin dependent  (  ) non-insulin dependent                                         (  ) Metformin  (  ) Januvia  (  ) Glimepiride  (  ) Glipizide  (  ) Glyburide  (  ) Actos                                         (  ) GLP-1 receptor agonists (Ozempic, Mounjaro, Wegovy, Zepbound, Trulicity, Byetta, Victoza)                                         (  ) SGLT2 Inhibitors (Farxiga, Jardiance, Invokana)                                         (  ) Other    Bleeding Risk: 1.1%    Pre-cath Hydration: (  )  cc IV bolus x 1 over 1 hr followed by:    (  ) NS @ 75cc/hr until procedure (up to 2 hrs) if EF> 50%                                                                                                                             (  ) NS @ 50cc/hr until procedure (up to 2 hrs) if EF< 50%                                        (  ) No precath hydration d/t      RIGHT RADIAL ARTERY EVALUATION:  MADIHA TEST: [] Negative          [] Positive    EF: 1. Left ventricular cavity is normal in size. Left ventricular wall thickness is normal. Left ventricular systolic function  is normal with an ejection fraction of 63 % by Schmid's method of disks. There are no regional wall motion   abnormalities seen.  2. Normal left ventricular diastolic function, with normal left ventricular filling pressure.  3. Normal right ventricular cavity size, with normal wall thickness, and normal right ventricular systolic function.  4. Normal left and right atrial size.  5. No significant valvular disease.  6. No echocardiographic evidence of pulmonary hypertension  Date: 12/20/2024    EKG:   Date: Patient is a 59y Male PMH of HTN, HLD and nephrolithiasis requiring prior intervention, AAA without prior surgical intervention, obesity, former smoker (smoked for 40 years, quit 3 years ago), chronic back pain. Patient reports SOB when going up 2 flights of stairs or when walking at a fast pace. CCTA revealed CAC of 847 with multivessel disease and presents to cardiology department for LHC and possible intervention.    < from: CT Fractional Flow Reserve (FFR-CT) Non-Invasive w/ Interpretation & Report (12.27.24 @ 08:14) >  FINDINGS:  Left Main: FFR CT extending to the distal segment is 0.57 which is   consistent with a hemodynamically significant lesion.    LAD: FFR CT extending to the distal segment is <0.50.    LCX:FFR CT extending to the distal segment is 0.55.    RCA:FFR CT extending to the distal segment is <0.50 which is consistent   with a hemodynamically significant lesion.    < from: CT Angio Heart and Coronaries w/ IV Cont (12.20.24 @ 08:46) >  IMPRESSION:    -Diffuse multivesselcoronary artery disease.  -Moderate stenosis in the distal LM due to calcified plaque.  -Moderate stenosis in the proximal to mid LAD due to calcified plaque.  -Moderate stenosis in the LCx due to scattered calcified plaque.  -Severe stenosis in the proximal to mid RCA due to mixed plaque.  -The total Agatston coronary artery calcium score equals 847, which   corresponds to 95th percentile for age, gender and ethnicity.  -CAD-RADS 4B.    Pre cath note:  indication:  [ ] STEMI                [ ] NSTEMI                 [ ] Acute coronary syndrome                   [ ]Unstable Angina   [ ] high risk  [ ] intermediate risk  [ ] low risk                   [X ] Stable Angina     non-invasive testing:     CCTA                     Date:  12/20/2024                   result: [ ] high risk  [X ] intermediate risk  [ ] low risk    Anti- Anginal medications:                    [ ] not used d/t                     [ ] used   (x ) BB     (X ) CCB      ( ) Nitrate   (  ) Ranexa          [ ] not used but strong indication not to use    Ejection Fraction                   [ ] <29            [ ] 30-39%   [ ] 40-49%     [X ]>50%    CHF          [ ] active (within last 14 days on meds   [ ] Chronic (on meds but no exacerbation)  NYHA Functional Class:  (  ) Class I (no limitations)  (  ) Class II (slight limitation)  (  ) Class III (marked limitation)  (  ) Class IV (symptoms at rest)    COPD                   [ ] mild (on chronic bronchodilators)  [ ] moderate (on chronic steroid therapy)      [ ] severe (indication for home O2 or PACO2 >50)    Other risk factors:                     [ ] Previous MI                     [ ] CVA/ stroke                    [ ] carotid stent/ CEA                    [ ] PVD/PAD- (arterial aneurysm, non-palpable pulses, tortuous vessel with inability to insert catheter, infra-renal dissection, renal or subclavian artery stenosis)                    [ ] previous CABG                    [ ] Renal Failure:  on HD  (  ) yes  (  ) no                    [ ] Diabetic  (  ) Type 1  (  ) Type 2                                         (  ) Insulin dependent  (  ) non-insulin dependent                                         (  ) Metformin  (  ) Januvia  (  ) Glimepiride  (  ) Glipizide  (  ) Glyburide  (  ) Actos                                         (  ) GLP-1 receptor agonists (Ozempic, Mounjaro, Wegovy, Zepbound, Trulicity, Byetta, Victoza)                                         (  ) SGLT2 Inhibitors (Farxiga, Jardiance, Invokana)                                         (  ) Other    Bleeding Risk: 1.1%    Pre-cath Hydration: (x  )  cc IV bolus x 1 over 1 hr followed by:    ( x ) NS @ 75cc/hr until procedure (up to 2 hrs) if EF> 50%                                                                                                                               RIGHT RADIAL ARTERY EVALUATION:  MADIHA TEST: [] Negative          [x] Positive    EF: 1. Left ventricular cavity is normal in size. Left ventricular wall thickness is normal. Left ventricular systolic function  is normal with an ejection fraction of 63 % by Schmid's method of disks. There are no regional wall motion   abnormalities seen.  2. Normal left ventricular diastolic function, with normal left ventricular filling pressure.  3. Normal right ventricular cavity size, with normal wall thickness, and normal right ventricular systolic function.  4. Normal left and right atrial size.  5. No significant valvular disease.  6. No echocardiographic evidence of pulmonary hypertension  Date: 12/20/2024    EKG: NSR  Date: 1/9/2025

## 2025-01-06 NOTE — H&P CARDIOLOGY - NSICDXPASTMEDICALHX_GEN_ALL_CORE_FT
PAST MEDICAL HISTORY:  Gallstones     History of abdominal aortic aneurysm (AAA)     Hypertension

## 2025-01-07 RX ORDER — VALSARTAN 80 MG/1
1 TABLET ORAL
Refills: 0 | DISCHARGE

## 2025-01-09 ENCOUNTER — OUTPATIENT (OUTPATIENT)
Dept: OUTPATIENT SERVICES | Facility: HOSPITAL | Age: 60
LOS: 1 days | Discharge: HOME | End: 2025-01-09
Payer: MEDICARE

## 2025-01-09 ENCOUNTER — TRANSCRIPTION ENCOUNTER (OUTPATIENT)
Age: 60
End: 2025-01-09

## 2025-01-09 VITALS
HEART RATE: 64 BPM | DIASTOLIC BLOOD PRESSURE: 71 MMHG | OXYGEN SATURATION: 97 % | RESPIRATION RATE: 16 BRPM | SYSTOLIC BLOOD PRESSURE: 110 MMHG

## 2025-01-09 DIAGNOSIS — Z98.890 OTHER SPECIFIED POSTPROCEDURAL STATES: Chronic | ICD-10-CM

## 2025-01-09 DIAGNOSIS — Z90.89 ACQUIRED ABSENCE OF OTHER ORGANS: Chronic | ICD-10-CM

## 2025-01-09 DIAGNOSIS — I25.10 ATHEROSCLEROTIC HEART DISEASE OF NATIVE CORONARY ARTERY WITHOUT ANGINA PECTORIS: ICD-10-CM

## 2025-01-09 LAB
ANION GAP SERPL CALC-SCNC: 10 MMOL/L — SIGNIFICANT CHANGE UP (ref 7–14)
BUN SERPL-MCNC: 16 MG/DL — SIGNIFICANT CHANGE UP (ref 10–20)
CALCIUM SERPL-MCNC: 8.7 MG/DL — SIGNIFICANT CHANGE UP (ref 8.4–10.5)
CHLORIDE SERPL-SCNC: 106 MMOL/L — SIGNIFICANT CHANGE UP (ref 98–110)
CO2 SERPL-SCNC: 26 MMOL/L — SIGNIFICANT CHANGE UP (ref 17–32)
CREAT SERPL-MCNC: 1.2 MG/DL — SIGNIFICANT CHANGE UP (ref 0.7–1.5)
EGFR: 69 ML/MIN/1.73M2 — SIGNIFICANT CHANGE UP
GLUCOSE SERPL-MCNC: 96 MG/DL — SIGNIFICANT CHANGE UP (ref 70–99)
HCT VFR BLD CALC: 43.4 % — SIGNIFICANT CHANGE UP (ref 42–52)
HGB BLD-MCNC: 14.8 G/DL — SIGNIFICANT CHANGE UP (ref 14–18)
MCHC RBC-ENTMCNC: 29.9 PG — SIGNIFICANT CHANGE UP (ref 27–31)
MCHC RBC-ENTMCNC: 34.1 G/DL — SIGNIFICANT CHANGE UP (ref 32–37)
MCV RBC AUTO: 87.7 FL — SIGNIFICANT CHANGE UP (ref 80–94)
NRBC # BLD: 0 /100 WBCS — SIGNIFICANT CHANGE UP (ref 0–0)
PLATELET # BLD AUTO: 183 K/UL — SIGNIFICANT CHANGE UP (ref 130–400)
PMV BLD: 10.1 FL — SIGNIFICANT CHANGE UP (ref 7.4–10.4)
POTASSIUM SERPL-MCNC: 4.2 MMOL/L — SIGNIFICANT CHANGE UP (ref 3.5–5)
POTASSIUM SERPL-SCNC: 4.2 MMOL/L — SIGNIFICANT CHANGE UP (ref 3.5–5)
RBC # BLD: 4.95 M/UL — SIGNIFICANT CHANGE UP (ref 4.7–6.1)
RBC # FLD: 12.7 % — SIGNIFICANT CHANGE UP (ref 11.5–14.5)
SODIUM SERPL-SCNC: 142 MMOL/L — SIGNIFICANT CHANGE UP (ref 135–146)
WBC # BLD: 6.32 K/UL — SIGNIFICANT CHANGE UP (ref 4.8–10.8)
WBC # FLD AUTO: 6.32 K/UL — SIGNIFICANT CHANGE UP (ref 4.8–10.8)

## 2025-01-09 PROCEDURE — 93010 ELECTROCARDIOGRAM REPORT: CPT

## 2025-01-09 PROCEDURE — C1887: CPT

## 2025-01-09 PROCEDURE — C1894: CPT

## 2025-01-09 PROCEDURE — 93005 ELECTROCARDIOGRAM TRACING: CPT

## 2025-01-09 PROCEDURE — 93458 L HRT ARTERY/VENTRICLE ANGIO: CPT | Mod: 26

## 2025-01-09 PROCEDURE — 80048 BASIC METABOLIC PNL TOTAL CA: CPT

## 2025-01-09 PROCEDURE — C1769: CPT

## 2025-01-09 PROCEDURE — 85027 COMPLETE CBC AUTOMATED: CPT

## 2025-01-09 PROCEDURE — 36415 COLL VENOUS BLD VENIPUNCTURE: CPT

## 2025-01-09 PROCEDURE — 93458 L HRT ARTERY/VENTRICLE ANGIO: CPT

## 2025-01-09 PROCEDURE — 99203 OFFICE O/P NEW LOW 30 MIN: CPT

## 2025-01-09 RX ORDER — PANTOPRAZOLE 40 MG/1
40 TABLET, DELAYED RELEASE ORAL
Refills: 0 | DISCHARGE

## 2025-01-09 RX ORDER — SODIUM CHLORIDE 9 MG/ML
250 INJECTION, SOLUTION INTRAMUSCULAR; INTRAVENOUS; SUBCUTANEOUS
Refills: 0 | Status: DISCONTINUED | OUTPATIENT
Start: 2025-01-09 | End: 2025-01-09

## 2025-01-09 RX ORDER — ASPIRIN 81 MG
324 TABLET, DELAYED RELEASE (ENTERIC COATED) ORAL ONCE
Refills: 0 | Status: COMPLETED | OUTPATIENT
Start: 2025-01-09 | End: 2025-01-09

## 2025-01-09 RX ORDER — SODIUM CHLORIDE 9 MG/ML
300 INJECTION, SOLUTION INTRAMUSCULAR; INTRAVENOUS; SUBCUTANEOUS
Refills: 0 | Status: DISCONTINUED | OUTPATIENT
Start: 2025-01-09 | End: 2025-01-09

## 2025-01-09 RX ORDER — ROSUVASTATIN 40 MG/1
1 TABLET, FILM COATED ORAL
Refills: 0 | DISCHARGE

## 2025-01-09 RX ORDER — SODIUM CHLORIDE 9 MG/ML
150 INJECTION, SOLUTION INTRAMUSCULAR; INTRAVENOUS; SUBCUTANEOUS
Refills: 0 | Status: DISCONTINUED | OUTPATIENT
Start: 2025-01-09 | End: 2025-01-09

## 2025-01-09 RX ORDER — ASPIRIN 81 MG
0 TABLET, DELAYED RELEASE (ENTERIC COATED) ORAL
Refills: 0 | DISCHARGE

## 2025-01-09 RX ORDER — METOPROLOL TARTRATE 50 MG
1 TABLET ORAL
Refills: 0 | DISCHARGE

## 2025-01-09 RX ADMIN — SODIUM CHLORIDE 250 MILLILITER(S): 9 INJECTION, SOLUTION INTRAMUSCULAR; INTRAVENOUS; SUBCUTANEOUS at 13:53

## 2025-01-09 RX ADMIN — Medication 324 MILLIGRAM(S): at 13:53

## 2025-01-09 RX ADMIN — SODIUM CHLORIDE 150 MILLILITER(S): 9 INJECTION, SOLUTION INTRAMUSCULAR; INTRAVENOUS; SUBCUTANEOUS at 13:53

## 2025-01-09 NOTE — CHART NOTE - NSCHARTNOTEFT_GEN_A_CORE
******THIS IS AN INCOMPLETE NOTE: FURTHER FINDINGS MAY BE ADDED ONCE MARKED COMPLETE*******    PRE-OP DIAGNOSIS:    + CCTA    PROCEDURE:   [x ] Coronary Angiogram   [x ] [facility]      PHYSICIAN:  Dr. Nicole  INTERVENTIONAL FELLOW:  FELLOW: Dr Zander Garrison    PROCEDURE DESCRIPTION:   - Bilateral coronary angiography  - [facility]    Consent:    [x] Patient   [] Family Member   []  Used      Anesthesia:   [ ] General   [X] Sedation   [X] Local     Access & Closure:   [6]   Fr R    Radial Artery  -> TR Band     IV Contrast: 70 mL      Intervention:      Implants:           AUC: 7     FINDINGS:   Coronary Dominance: right  LM: distal 60-70% atherosclerotic calcified lesion   LAD: pLAD 70% stenotic lesion at the bifurcation with D1  D1: 90% ostial D1 atherosclerotic lesion   CX: pLCx 80% atherosclerotic lesion, diffuse mod to severe disease in mid and distal segments  RCA: mid segment with 80% atherosclerotic lesion    LVEDP:  16 mmHg     EF:  %      ESTIMATED BLOOD LOSS: < 10 mL      CONDITION:   [x] Good   [ ] Fair   [ ] Critical     SPECIMEN REMOVED: N/A     POST-OP DIAGNOSIS:    Triple vessel disease SYNTAX SCORE 34  distal LM 60-70% atherosclerotic lesion   pLAD 70% stenosis at the bifurcation with D1  Ostial D1 with 70% stenosis  mid RCA with 95% stenosis      PLAN OF CARE:   [x] CT Surgery Consult   [X] Medications: ASA,  statin  [X] LVEDP guided IV fluids   [x] Remove TR band and Hold manual pressure if signs of hematoma or bleeding over radial access site. ******THIS IS AN INCOMPLETE NOTE: FURTHER FINDINGS MAY BE ADDED ONCE MARKED COMPLETE*******    PRE-OP DIAGNOSIS:    + CCTA    PROCEDURE:   [x ] Coronary Angiogram   [x ] [facility]      PHYSICIAN:  Dr. Nicole  INTERVENTIONAL FELLOW:  FELLOW: Dr Zander Garrison    PROCEDURE DESCRIPTION:   - Bilateral coronary angiography  - [facility]    Consent:    [x] Patient   [] Family Member   []  Used      Anesthesia:   [ ] General   [X] Sedation   [X] Local     Access & Closure:   [6]   Fr R    Radial Artery  -> TR Band     IV Contrast: 70 mL      Intervention:      Implants:           AUC: 7     FINDINGS:   Coronary Dominance: right  LM: distal 60-70% atherosclerotic calcified lesion   LAD: pLAD 70% stenotic lesion at the bifurcation with D1  D1: 90% ostial D1 atherosclerotic lesion   CX: pLCx 80% atherosclerotic lesion, diffuse mod to severe disease in mid and distal segments  RCA: mid segment with 90% atherosclerotic lesion    LVEDP:  16 mmHg     EF:  %      ESTIMATED BLOOD LOSS: < 10 mL      CONDITION:   [x] Good   [ ] Fair   [ ] Critical     SPECIMEN REMOVED: N/A     POST-OP DIAGNOSIS:    Triple vessel disease SYNTAX SCORE 34       PLAN OF CARE:   [x] CT Surgery Consult   [X] Medications: ASA,  statin  [X] LVEDP guided IV fluids   [x] Remove TR band and Hold manual pressure if signs of hematoma or bleeding over radial access site.

## 2025-01-09 NOTE — ASU DISCHARGE PLAN (ADULT/PEDIATRIC) - FINANCIAL ASSISTANCE
[facility] provides services at a reduced cost to those who are determined to be eligible through [facility]’s financial assistance program. Information regarding [facility]’s financial assistance program can be found by going to [email or web address] or by calling [phone].

## 2025-01-09 NOTE — ASU PATIENT PROFILE, ADULT - FALL HARM RISK - UNIVERSAL INTERVENTIONS
Bed in lowest position, wheels locked, appropriate side rails in place/Call bell, personal items and telephone in reach/Instruct patient to call for assistance before getting out of bed or chair/Non-slip footwear when patient is out of bed/[place] to call system/Physically safe environment - no spills, clutter or unnecessary equipment/Purposeful Proactive Rounding/Room/bathroom lighting operational, light cord in reach

## 2025-01-09 NOTE — ASU DISCHARGE PLAN (ADULT/PEDIATRIC) - ASU DC SPECIAL INSTRUCTIONSFT
Activity:  - Do not drive or operate heavy machinery for 24 hours.  - Limit your physical or any strenuous activity for 2 weeks after angioplasty and 48 hours for angiogram. Support the groin site with your hand when you sneeze or cough. No heavy lifting ( objects more then 10 pounds).  - For wrist access, avoid using affected arm for 24 hours after removal of dressing and avoid heavy lifting for 7 days.  Hygiene:  - After 24 hours, you may shower and remove the dressing from the site. Do not tub bathe for one week. Do not rub or apply lotion, cream, powder to the affected site. Leave it open to air.   Diet:   - You may resume your diet. Low Sodium. Low Fat, Low Cholesterol.  If Diabetic - Carbohydrate Consistent Diet.      - Drink extra fluid unless otherwise advised.   Special Instructions:  - Bruising or black and blue at the puncture site is possible.  - If there is bleeding from the puncture site (groin or wrist) apply direct firm pressure on the site and call 911.  - Any sudden swelling, redness, fever, discharge or severe pain, call your physician or call the cath lab.   - If you notice any scab formation in the area avoid touching the site and allow it to heal.  - Numbness or "pins and needle" sensation in the affected arm, hand, leg or if the affected site become cool to touch or pale that persist for extended period of time call your physician immediately to be checked.  - If you developed chest pain, not relieved by your usual routine medication, fainting, lethargy, weakness, report to the nearest emergency room.   - Inform your Dentist or Surgeon if you are taking Aspirin or any antiplatelet medications. Report any bleeding in your urine or stool.   Medications:  - Soreness or tenderness at the site is possible it will diminish over time. You may take Tylenol every 4-6 hours as needed. Nothing stronger is needed.  - If you are diabetic and taking medication containing Metformin, do not take them for 48 hours after the procedure.     Any questions call Cardiac Cath Lab at [phone] or [phone], Monday - Friday from 7 - 9 pm.

## 2025-01-09 NOTE — CONSULT NOTE ADULT - SUBJECTIVE AND OBJECTIVE BOX
Surgeon: Dr. Ruggiero/ Sole / Veronica    Consult requesting by: mili   primary cardiologist: Loyda Paredes MD    HISTORY OF PRESENT ILLNESS:   59y Male PMH of HTN, HLD, nephrolithiasis, GERD, Cholecystitis (s/p lap abdulaziz), AAA without prior surgical intervention, obesity, former smoker (smoked for 40 years, quit 3 years ago), chronic back pain. Patient reported SOB when going up 2 flights of stairs or when walking at a fast pace. CCTA revealed CAC of 847 with multivessel disease, followed by CT Fractional flow Reserve (FFR-CT) confirming findings. Presented today for elective LHC which demonstrated Left main with multi-vessel involvement. CTS consulted for myocardial revascularization recommendation.  No chest pain at current time      Home Medications:  amLODIPine 5 mg oral tablet: 1 tab(s) orally (09 Jan 2025 13:45)  aspirin 81 mg oral tablet: orally once a day (09 Jan 2025 13:45)  metoprolol succinate 50 mg oral capsule, extended release: 1 cap(s) orally once a day (09 Jan 2025 13:45)  Pantoprazole: 40 milligram(s) orally once a day (09 Jan 2025 13:45)  rosuvastatin 20 mg oral tablet: 1 tab(s) orally (09 Jan 2025 13:45)  valsartan 160 mg oral tablet: 1 tab(s) orally (07 Jan 2025 16:22)    NYHA functional class    [ X] Class I (no limitation) [ ] Class II (slight limitation) [ ] Class III (marked limitation) [ ] Class IV (symptoms at rest)    [place] [employer] grading of angina pectoris  [  ]  Class I	Angina only during strenuous or prolonged physical activity  [ X ]  Class II	Slight limitation, with angina only during vigorous physical activity  [ ]  Class III	Symptoms with everyday living activities, ie, moderate limitation  [ ]  Class IV	Inability to perform any activity without angina or angina at rest, ie, severe limitation      PAST MEDICAL & SURGICAL HISTORY:  Hypertension  Gallstones  History of abdominal aortic aneurysm (AAA)  History of tonsillectomy  H/O shoulder surgery  h/o fx left shoulder  h/o concussion  s/p lumbar fusion  s/p laparoscopic cholecystectomy     MEDICATIONS  (STANDING):  sodium chloride 0.9%. 250 milliLiter(s) (250 mL/Hr) IV Continuous <Continuous>  sodium chloride 0.9%. 150 milliLiter(s) (150 mL/Hr) IV Continuous <Continuous>  sodium chloride 0.9%. 300 milliLiter(s) (150 mL/Hr) IV Continuous <Continuous>    MEDICATIONS  (PRN):    Antiplatelet therapy:   none                        Last dose/amt:    Allergies    No Known Allergies    SOCIAL HISTORY:  Smoker: ex smoker stopped 3 years ago  ETOH use: [ ] Yes social once a month  Ilicit Drug use: No  Occupation: retired   Lives with: wife  Assisted device use: none    FAMILY HISTORY: no early family h/o CAD      Review of Systems  CONSTITUTIONAL: no fever, chills or night sweats]   NEURO:  denies seizures, paralysis or paresthesias                                                                                EYES: wears glasses, no double vision, no blurry vision                                                                          ENMT: no difficulty hearing, vertigo, dysphagia, epistaxis recent dental work [ ]plates that snap in                                     CV:  denies chest pain, PANTOJA or palpitations at current time                                                                                            RESPIRATORY:  no cough or hemoptysis                                                                 GI:  no nausea, vomiting, constipation or diarrhea   : denies dysuria, hematuria, incontinence or retention, + nocturia                                                                                           MUSKULOSKELETAL:  denies joint swelling or muscle weakness  PSYCH:  denies dementia, depression, anxiety   ENDOCRINE:  cold intolerance[ ] heat intolerance[ ] polydipsia[ ]                                                                                                                                                                                                PHYSICAL EXAM  Vital Signs Last 24 Hrs  T(C): 36.1 (09 Jan 2025 16:07), Max: 36.6 (09 Jan 2025 12:59)  T(F): 97 (09 Jan 2025 16:07), Max: 97.9 (09 Jan 2025 12:59)  HR: 64 (09 Jan 2025 16:27) (64 - 67)  BP: 141/90 (09 Jan 2025 16:27) (131/88 - 148/89)  BP(mean): --  RR: 16 (09 Jan 2025 16:27) (14 - 16)  SpO2: 97% (09 Jan 2025 16:27) (97% - 99%)    CONSTITUTIONAL:    well nourished, well developed, overweight in NAD                                                                       Neuro: oriented to person/place & time with no focal motor or sensory  deficits     Eyes: PERRLA, EOMI, no nystagmus, sclera anicteric  ENT:  nasal/oral mucosa with absence of cyanosis, fair dentition  Neck: no jugular vein distention, trachea midline, no goiter   Chest: bilateral breath sounds with good air movement absence of wheezes, rales, or rhonchi                                                                           CV:  RSR, S1S2, no significant murmur appreciated  Carotids: No Bruits  GI:  soft, non-tender non-distended, + bowel sounds, lap abdulaziz scars                                                                                                         Extremities:  no evidence of cyanosis or deformity, no pedal edema   Extremity Pulses: right / left:  femorals 2+/ 2+; DP's pressure dressing/2+; radials 2+/2+     SKIN : no rashes    Five Meter Walk test not done due to patient being on strict BR right after cardiac catheterization, consult done in lab.                                                          LABS:                        14.8   6.32  )-----------( 183      ( 09 Jan 2025 14:21 )             43.4     01-09    142  |  106  |  16  ----------------------------<  96  4.2   |  26  |  1.2    Ca    8.7      09 Jan 2025 14:21      Cardiac Cath:   FINDINGS:   Coronary Dominance: right  LM: distal 60-70% atherosclerotic calcified lesion   LAD: pLAD 70% stenotic lesion at the bifurcation with D1  D1: 90% ostial D1 atherosclerotic lesion   CX: pLCx 80% atherosclerotic lesion, diffuse mod to severe disease in mid and distal segments  RCA: mid segment with 80% atherosclerotic lesion    LVEDP:  16 mmHg < from: CT Fractional Flow Reserve (FFR-CT) Non-Invasive w/ Interpretation & Report (12.27.24 @ 08:14) >  FINDINGS:    Left Main: FFR CT extending to the distal segment is 0.57 which is   consistent with a hemodynamically significant lesion.      LAD: FFR CT extending to the distal segment is <0.50.      LCX:FFR CT extending to the distal segment is 0.55.      RCA:FFR CT extending to the distal segment is <0.50 which is consistent   with a hemodynamically significant lesion.        IMPRESSION:    Hemodynamically significant focal lesions in the LM and RCA.    Referrence FFRCT Values:  a. >0.80 (distal to the stenosis): Not hemodynamically significant    b. >0.76-0.8 (distal to the stenosis): Borderline hemodynamically   significant    c. ?0.75 (distal to the stenosis): Hemodynamically significant    < end of copied text >  < from: CT Angio Heart and Coronaries w/ IV Cont (12.20.24 @ 08:46) >    Calcium Score:    Vessel              Calcium Score    =======================================================  LM:                    145  LAD:                 360  LCX:                 62  RCA:                 280  =======================================================  Total:                847    < end of copied text >  < from: CT Abdomen and Pelvis No Cont (12.01.23 @ 08:51) >  OTHER: Vascular calcifications. The infrarenal abdominal aorta becomes   focally mildly dilated measuring 3.6 x 3.7 cm, new since prior exam...      IMPRESSION:    No evidence of renal stones.    Abdominal aortic aneurysm measuring 3.7 cm, new since priorexam      STS Score:   Procedure Type: Isolated CABG  Perioperative Outcome	Estimate %  Operative Mortality	0.624%  Morbidity & Mortality	4.55%  Stroke	0.549%  Renal Failure	0.768%  Reoperation	1.84%  Prolonged Ventilation	2.27%  Deep Sternal Wound Infection	0.151%  Long Hospital Stay (>14 days)	1.63%  Short Hospital Stay (<6 days)*	62.1%  *higher values reflect a better outcome  Clinical Summary  Planned Surgery:	Isolated CABG, Elective, First cardiovascular surgery  Demographics:	59 year old, White, male, 116kg, 185cm, BMI: 33.9 kg/m²  Lab Values:	Creatinine: 1.3 mg/dL, Hematocrit: 48%, WBC Count: 8 10³/µL, Platelet Count: 023034 cells/µL  PreOp Medications:	ACE Inhibitors/ARBs = 48 hrs  Substance Abuse:	Former smoker, Alcohol use: = 1 drink/week  Risk Factors / Comorbidities:	Hypertension  Pulmonary RF:	Mild CLD  Cardiac Status:	Ejection Fraction = 50%  Coronary Artery Disease:	3 vessels diseased, Left Main Stenosis = 50%, Proximal LAD Stenosis = 70%, Stable Angina      STS score and results NOT discussed with patient due to pre-op work-up being incomplete        Assessment/ Plan:  59y Male PMH of HTN, HLD, nephrolithiasis, GERD, Cholecystitis (s/p lap abdulaziz), AAA without prior surgical intervention, obesity, former smoker (smoked for 40 years, quit 3 years ago), chronic back pain. Patient reported SOB when going up 2 flights of stairs or when walking at a fast pace. CCTA revealed CAC of 847 with multivessel disease, followed by CT Fractional flow Reserve (FFR-CT) confirming findings. Presented today for elective LHC which demonstrated Left main with multi-vessel involvement. CTS consulted for myocardial revascularization recommendation.  No chest pain at current time.      Stable Angina - multi-vessel CAD -  will need elective CABG  Continue current medical treatment -  given card for f/u appointment with Dr. Martin next week  Will need CTA of chest abdomen and pelvis due to h/o abdominal aneurysm pre-op - not able to get today due to recent cath  Will attempt to get TTE prior to going home    Case discussed with Dr. Martin

## 2025-01-09 NOTE — ASU PREOP CHECKLIST - WEIGHT IN LBS
040
Detail Level: Detailed
Quality 431: Preventive Care And Screening: Unhealthy Alcohol Use - Screening: Patient screened for unhealthy alcohol use using a single question and scores less than 2 times per year
Quality 130: Documentation Of Current Medications In The Medical Record: Current Medications Documented
Quality 226: Preventive Care And Screening: Tobacco Use: Screening And Cessation Intervention: Patient screened for tobacco use and is an ex/non-smoker
Quality 47: Advance Care Plan: Advance Care Planning discussed and documented; advance care plan or surrogate decision maker documented in the medical record.

## 2025-01-09 NOTE — ASU DISCHARGE PLAN (ADULT/PEDIATRIC) - PROVIDER TOKENS
PROVIDER:[TOKEN:[[id number]],FOLLOWUP:[2 weeks]],PROVIDER:[TOKEN:[[id number]],SCHEDULEDAPPT:[01/16/2025],SCHEDULEDAPPTTIME:[11:00 AM]]

## 2025-01-09 NOTE — ASU DISCHARGE PLAN (ADULT/PEDIATRIC) - CARE PROVIDER_API CALL
Aman Chairez  Interventional Cardiology  [address] [place] [id number]  Phone: [phone]  Fax: [phone]  Follow Up Time: 2 weeks    Wilfred Martin  Thoracic and Cardiac Surgery  [address] [place] [address], [address]  [place] [address]  Phone: [phone]  Fax: [phone]  Scheduled Appointment: 01/16/2025 11:00 AM

## 2025-01-13 DIAGNOSIS — E78.5 HYPERLIPIDEMIA, UNSPECIFIED: ICD-10-CM

## 2025-01-13 DIAGNOSIS — I25.118 ATHEROSCLEROTIC HEART DISEASE OF NATIVE CORONARY ARTERY WITH OTHER FORMS OF ANGINA PECTORIS: ICD-10-CM

## 2025-01-13 DIAGNOSIS — I10 ESSENTIAL (PRIMARY) HYPERTENSION: ICD-10-CM

## 2025-01-15 PROBLEM — Z86.79 PERSONAL HISTORY OF OTHER DISEASES OF THE CIRCULATORY SYSTEM: Chronic | Status: ACTIVE | Noted: 2025-01-06

## 2025-01-16 ENCOUNTER — APPOINTMENT (OUTPATIENT)
Dept: CARDIOTHORACIC SURGERY | Facility: CLINIC | Age: 60
End: 2025-01-16
Payer: MEDICARE

## 2025-01-16 VITALS
WEIGHT: 255 LBS | SYSTOLIC BLOOD PRESSURE: 131 MMHG | OXYGEN SATURATION: 95 % | HEART RATE: 75 BPM | DIASTOLIC BLOOD PRESSURE: 88 MMHG | RESPIRATION RATE: 14 BRPM | BODY MASS INDEX: 34.54 KG/M2 | HEIGHT: 72 IN

## 2025-01-16 DIAGNOSIS — I25.10 ATHEROSCLEROTIC HEART DISEASE OF NATIVE CORONARY ARTERY W/OUT ANGINA PECTORIS: ICD-10-CM

## 2025-01-16 PROCEDURE — 99204 OFFICE O/P NEW MOD 45 MIN: CPT

## 2025-01-16 RX ORDER — OMEPRAZOLE 40 MG/1
40 CAPSULE, DELAYED RELEASE ORAL
Refills: 0 | Status: ACTIVE | COMMUNITY

## 2025-01-16 RX ORDER — AMLODIPINE BESYLATE 5 MG/1
5 TABLET ORAL
Refills: 0 | Status: ACTIVE | COMMUNITY

## 2025-01-16 RX ORDER — VALSARTAN 160 MG/1
160 TABLET, COATED ORAL
Refills: 0 | Status: DISCONTINUED | COMMUNITY
End: 2025-01-16

## 2025-01-16 RX ORDER — HYDROCHLOROTHIAZIDE 12.5 MG/1
12.5 CAPSULE ORAL
Refills: 0 | Status: ACTIVE | COMMUNITY

## 2025-01-24 ENCOUNTER — OUTPATIENT (OUTPATIENT)
Dept: OUTPATIENT SERVICES | Facility: HOSPITAL | Age: 60
LOS: 1 days | End: 2025-01-24
Payer: MEDICARE

## 2025-01-24 ENCOUNTER — APPOINTMENT (OUTPATIENT)
Dept: PULMONOLOGY | Facility: HOSPITAL | Age: 60
End: 2025-01-24

## 2025-01-24 DIAGNOSIS — Z98.890 OTHER SPECIFIED POSTPROCEDURAL STATES: Chronic | ICD-10-CM

## 2025-01-24 DIAGNOSIS — I25.10 ATHEROSCLEROTIC HEART DISEASE OF NATIVE CORONARY ARTERY WITHOUT ANGINA PECTORIS: ICD-10-CM

## 2025-01-24 DIAGNOSIS — Z90.89 ACQUIRED ABSENCE OF OTHER ORGANS: Chronic | ICD-10-CM

## 2025-01-24 PROCEDURE — 94727 GAS DIL/WSHOT DETER LNG VOL: CPT | Mod: 26

## 2025-01-24 PROCEDURE — 94070 EVALUATION OF WHEEZING: CPT

## 2025-01-24 PROCEDURE — 94664 DEMO&/EVAL PT USE INHALER: CPT

## 2025-01-24 PROCEDURE — 94726 PLETHYSMOGRAPHY LUNG VOLUMES: CPT

## 2025-01-24 PROCEDURE — 94060 EVALUATION OF WHEEZING: CPT | Mod: 26

## 2025-01-24 PROCEDURE — 94729 DIFFUSING CAPACITY: CPT | Mod: 26

## 2025-01-24 PROCEDURE — 94729 DIFFUSING CAPACITY: CPT

## 2025-01-25 DIAGNOSIS — I25.10 ATHEROSCLEROTIC HEART DISEASE OF NATIVE CORONARY ARTERY WITHOUT ANGINA PECTORIS: ICD-10-CM

## 2025-02-03 ENCOUNTER — OUTPATIENT (OUTPATIENT)
Dept: OUTPATIENT SERVICES | Facility: HOSPITAL | Age: 60
LOS: 1 days | End: 2025-02-03
Payer: MEDICARE

## 2025-02-03 ENCOUNTER — RESULT REVIEW (OUTPATIENT)
Age: 60
End: 2025-02-03

## 2025-02-03 DIAGNOSIS — Z98.890 OTHER SPECIFIED POSTPROCEDURAL STATES: Chronic | ICD-10-CM

## 2025-02-03 DIAGNOSIS — Z00.8 ENCOUNTER FOR OTHER GENERAL EXAMINATION: ICD-10-CM

## 2025-02-03 DIAGNOSIS — Z90.89 ACQUIRED ABSENCE OF OTHER ORGANS: Chronic | ICD-10-CM

## 2025-02-03 PROCEDURE — 93880 EXTRACRANIAL BILAT STUDY: CPT

## 2025-02-03 PROCEDURE — 93880 EXTRACRANIAL BILAT STUDY: CPT | Mod: 26

## 2025-02-04 DIAGNOSIS — I71.40 ABDOMINAL AORTIC ANEURYSM, WITHOUT RUPTURE, UNSPECIFIED: ICD-10-CM

## 2025-02-04 NOTE — PRE-ANESTHESIA EVALUATION ADULT - HEIGHT IN INCHES
Well adult.  Overall the patient appears to be in stable health.  He will obtain blood work as ordered for further evaluation.  He was recommended to see if his insurance will cover Cologuard testing for colon cancer screening.  Patient declined colonoscopy.   1

## 2025-02-05 ENCOUNTER — APPOINTMENT (OUTPATIENT)
Dept: CARDIOLOGY | Facility: CLINIC | Age: 60
End: 2025-02-05
Payer: MEDICARE

## 2025-02-05 VITALS
DIASTOLIC BLOOD PRESSURE: 86 MMHG | WEIGHT: 264 LBS | SYSTOLIC BLOOD PRESSURE: 120 MMHG | HEART RATE: 83 BPM | HEIGHT: 72 IN | BODY MASS INDEX: 35.76 KG/M2

## 2025-02-05 DIAGNOSIS — I10 ESSENTIAL (PRIMARY) HYPERTENSION: ICD-10-CM

## 2025-02-05 DIAGNOSIS — I25.10 ATHEROSCLEROTIC HEART DISEASE OF NATIVE CORONARY ARTERY W/OUT ANGINA PECTORIS: ICD-10-CM

## 2025-02-05 DIAGNOSIS — R06.09 OTHER FORMS OF DYSPNEA: ICD-10-CM

## 2025-02-05 PROCEDURE — 99214 OFFICE O/P EST MOD 30 MIN: CPT

## 2025-02-07 ENCOUNTER — OUTPATIENT (OUTPATIENT)
Dept: OUTPATIENT SERVICES | Facility: HOSPITAL | Age: 60
LOS: 1 days | End: 2025-02-07
Payer: MEDICARE

## 2025-02-07 ENCOUNTER — RESULT REVIEW (OUTPATIENT)
Age: 60
End: 2025-02-07

## 2025-02-07 DIAGNOSIS — Z98.890 OTHER SPECIFIED POSTPROCEDURAL STATES: Chronic | ICD-10-CM

## 2025-02-07 DIAGNOSIS — I71.40 ABDOMINAL AORTIC ANEURYSM, WITHOUT RUPTURE, UNSPECIFIED: ICD-10-CM

## 2025-02-07 DIAGNOSIS — Z90.89 ACQUIRED ABSENCE OF OTHER ORGANS: Chronic | ICD-10-CM

## 2025-02-07 DIAGNOSIS — Z00.8 ENCOUNTER FOR OTHER GENERAL EXAMINATION: ICD-10-CM

## 2025-02-07 PROCEDURE — 74174 CTA ABD&PLVS W/CONTRAST: CPT | Mod: 26

## 2025-02-07 PROCEDURE — 74174 CTA ABD&PLVS W/CONTRAST: CPT

## 2025-02-08 DIAGNOSIS — I71.40 ABDOMINAL AORTIC ANEURYSM, WITHOUT RUPTURE, UNSPECIFIED: ICD-10-CM

## 2025-02-10 PROCEDURE — 93010 ELECTROCARDIOGRAM REPORT: CPT

## 2025-02-11 ENCOUNTER — OUTPATIENT (OUTPATIENT)
Dept: OUTPATIENT SERVICES | Facility: HOSPITAL | Age: 60
LOS: 1 days | End: 2025-02-11
Payer: MEDICARE

## 2025-02-11 ENCOUNTER — RESULT REVIEW (OUTPATIENT)
Age: 60
End: 2025-02-11

## 2025-02-11 VITALS
DIASTOLIC BLOOD PRESSURE: 94 MMHG | SYSTOLIC BLOOD PRESSURE: 145 MMHG | WEIGHT: 268.96 LBS | HEIGHT: 73 IN | OXYGEN SATURATION: 98 % | RESPIRATION RATE: 16 BRPM | TEMPERATURE: 98 F | HEART RATE: 62 BPM

## 2025-02-11 DIAGNOSIS — I25.10 ATHEROSCLEROTIC HEART DISEASE OF NATIVE CORONARY ARTERY WITHOUT ANGINA PECTORIS: ICD-10-CM

## 2025-02-11 DIAGNOSIS — Z90.49 ACQUIRED ABSENCE OF OTHER SPECIFIED PARTS OF DIGESTIVE TRACT: Chronic | ICD-10-CM

## 2025-02-11 DIAGNOSIS — Z98.890 OTHER SPECIFIED POSTPROCEDURAL STATES: Chronic | ICD-10-CM

## 2025-02-11 DIAGNOSIS — Z90.89 ACQUIRED ABSENCE OF OTHER ORGANS: Chronic | ICD-10-CM

## 2025-02-11 LAB
A1C WITH ESTIMATED AVERAGE GLUCOSE RESULT: 6 % — HIGH (ref 4–5.6)
ALBUMIN SERPL ELPH-MCNC: 4.3 G/DL — SIGNIFICANT CHANGE UP (ref 3.5–5.2)
ALP SERPL-CCNC: 46 U/L — SIGNIFICANT CHANGE UP (ref 30–115)
ALT FLD-CCNC: 19 U/L — SIGNIFICANT CHANGE UP (ref 0–41)
ANION GAP SERPL CALC-SCNC: 10 MMOL/L — SIGNIFICANT CHANGE UP (ref 7–14)
APPEARANCE UR: CLEAR — SIGNIFICANT CHANGE UP
APTT BLD: 30.9 SEC — SIGNIFICANT CHANGE UP (ref 27–39.2)
AST SERPL-CCNC: 17 U/L — SIGNIFICANT CHANGE UP (ref 0–41)
BACTERIA # UR AUTO: NEGATIVE /HPF — SIGNIFICANT CHANGE UP
BASOPHILS # BLD AUTO: 0.05 K/UL — SIGNIFICANT CHANGE UP (ref 0–0.2)
BASOPHILS NFR BLD AUTO: 0.9 % — SIGNIFICANT CHANGE UP (ref 0–1)
BILIRUB SERPL-MCNC: 0.4 MG/DL — SIGNIFICANT CHANGE UP (ref 0.2–1.2)
BILIRUB UR-MCNC: NEGATIVE — SIGNIFICANT CHANGE UP
BLD GP AB SCN SERPL QL: SIGNIFICANT CHANGE UP
BUN SERPL-MCNC: 12 MG/DL — SIGNIFICANT CHANGE UP (ref 10–20)
CALCIUM SERPL-MCNC: 9.4 MG/DL — SIGNIFICANT CHANGE UP (ref 8.4–10.5)
CAST: 1 /LPF — SIGNIFICANT CHANGE UP (ref 0–4)
CHLORIDE SERPL-SCNC: 106 MMOL/L — SIGNIFICANT CHANGE UP (ref 98–110)
CO2 SERPL-SCNC: 25 MMOL/L — SIGNIFICANT CHANGE UP (ref 17–32)
COLOR SPEC: SIGNIFICANT CHANGE UP
CREAT SERPL-MCNC: 1.1 MG/DL — SIGNIFICANT CHANGE UP (ref 0.7–1.5)
DIFF PNL FLD: NEGATIVE — SIGNIFICANT CHANGE UP
EGFR: 76 ML/MIN/1.73M2 — SIGNIFICANT CHANGE UP
EOSINOPHIL # BLD AUTO: 0.37 K/UL — SIGNIFICANT CHANGE UP (ref 0–0.7)
EOSINOPHIL NFR BLD AUTO: 6.4 % — SIGNIFICANT CHANGE UP (ref 0–8)
ESTIMATED AVERAGE GLUCOSE: 126 MG/DL — HIGH (ref 68–114)
GLUCOSE SERPL-MCNC: 106 MG/DL — HIGH (ref 70–99)
GLUCOSE UR QL: NEGATIVE MG/DL — SIGNIFICANT CHANGE UP
HCT VFR BLD CALC: 45.3 % — SIGNIFICANT CHANGE UP (ref 42–52)
HGB BLD-MCNC: 15.5 G/DL — SIGNIFICANT CHANGE UP (ref 14–18)
IMM GRANULOCYTES NFR BLD AUTO: 0.2 % — SIGNIFICANT CHANGE UP (ref 0.1–0.3)
INR BLD: 1.04 RATIO — SIGNIFICANT CHANGE UP (ref 0.65–1.3)
KETONES UR-MCNC: ABNORMAL MG/DL
LEUKOCYTE ESTERASE UR-ACNC: ABNORMAL
LYMPHOCYTES # BLD AUTO: 1.94 K/UL — SIGNIFICANT CHANGE UP (ref 1.2–3.4)
LYMPHOCYTES # BLD AUTO: 33.6 % — SIGNIFICANT CHANGE UP (ref 20.5–51.1)
MCHC RBC-ENTMCNC: 29.7 PG — SIGNIFICANT CHANGE UP (ref 27–31)
MCHC RBC-ENTMCNC: 34.2 G/DL — SIGNIFICANT CHANGE UP (ref 32–37)
MCV RBC AUTO: 86.8 FL — SIGNIFICANT CHANGE UP (ref 80–94)
MONOCYTES # BLD AUTO: 0.6 K/UL — SIGNIFICANT CHANGE UP (ref 0.1–0.6)
MONOCYTES NFR BLD AUTO: 10.4 % — HIGH (ref 1.7–9.3)
MRSA PCR RESULT.: NEGATIVE — SIGNIFICANT CHANGE UP
NEUTROPHILS # BLD AUTO: 2.81 K/UL — SIGNIFICANT CHANGE UP (ref 1.4–6.5)
NEUTROPHILS NFR BLD AUTO: 48.5 % — SIGNIFICANT CHANGE UP (ref 42.2–75.2)
NITRITE UR-MCNC: NEGATIVE — SIGNIFICANT CHANGE UP
NRBC BLD AUTO-RTO: 0 /100 WBCS — SIGNIFICANT CHANGE UP (ref 0–0)
NT-PROBNP SERPL-SCNC: 116 PG/ML — SIGNIFICANT CHANGE UP (ref 0–300)
PH UR: 6 — SIGNIFICANT CHANGE UP (ref 5–8)
PLATELET # BLD AUTO: 195 K/UL — SIGNIFICANT CHANGE UP (ref 130–400)
PMV BLD: 9.8 FL — SIGNIFICANT CHANGE UP (ref 7.4–10.4)
POTASSIUM SERPL-MCNC: 4.4 MMOL/L — SIGNIFICANT CHANGE UP (ref 3.5–5)
POTASSIUM SERPL-SCNC: 4.4 MMOL/L — SIGNIFICANT CHANGE UP (ref 3.5–5)
PROT SERPL-MCNC: 7.5 G/DL — SIGNIFICANT CHANGE UP (ref 6–8)
PROT UR-MCNC: 30 MG/DL
PROTHROM AB SERPL-ACNC: 12.3 SEC — SIGNIFICANT CHANGE UP (ref 9.95–12.87)
RBC # BLD: 5.22 M/UL — SIGNIFICANT CHANGE UP (ref 4.7–6.1)
RBC # FLD: 12.5 % — SIGNIFICANT CHANGE UP (ref 11.5–14.5)
RBC CASTS # UR COMP ASSIST: 1 /HPF — SIGNIFICANT CHANGE UP (ref 0–4)
SODIUM SERPL-SCNC: 141 MMOL/L — SIGNIFICANT CHANGE UP (ref 135–146)
SP GR SPEC: >1.03 — HIGH (ref 1–1.03)
SQUAMOUS # UR AUTO: 0 /HPF — SIGNIFICANT CHANGE UP (ref 0–5)
T3 SERPL-MCNC: 145 NG/DL — SIGNIFICANT CHANGE UP (ref 80–200)
T4 AB SER-ACNC: 7.8 UG/DL — SIGNIFICANT CHANGE UP (ref 4.6–12)
TSH SERPL-MCNC: 2.86 UIU/ML — SIGNIFICANT CHANGE UP (ref 0.27–4.2)
UROBILINOGEN FLD QL: 1 MG/DL — SIGNIFICANT CHANGE UP (ref 0.2–1)
WBC # BLD: 5.78 K/UL — SIGNIFICANT CHANGE UP (ref 4.8–10.8)
WBC # FLD AUTO: 5.78 K/UL — SIGNIFICANT CHANGE UP (ref 4.8–10.8)
WBC UR QL: 0 /HPF — SIGNIFICANT CHANGE UP (ref 0–5)

## 2025-02-11 PROCEDURE — 85730 THROMBOPLASTIN TIME PARTIAL: CPT

## 2025-02-11 PROCEDURE — 84436 ASSAY OF TOTAL THYROXINE: CPT

## 2025-02-11 PROCEDURE — 84480 ASSAY TRIIODOTHYRONINE (T3): CPT

## 2025-02-11 PROCEDURE — 87640 STAPH A DNA AMP PROBE: CPT

## 2025-02-11 PROCEDURE — 86901 BLOOD TYPING SEROLOGIC RH(D): CPT

## 2025-02-11 PROCEDURE — 93005 ELECTROCARDIOGRAM TRACING: CPT

## 2025-02-11 PROCEDURE — 86900 BLOOD TYPING SEROLOGIC ABO: CPT

## 2025-02-11 PROCEDURE — 81001 URINALYSIS AUTO W/SCOPE: CPT

## 2025-02-11 PROCEDURE — 84443 ASSAY THYROID STIM HORMONE: CPT

## 2025-02-11 PROCEDURE — 87641 MR-STAPH DNA AMP PROBE: CPT

## 2025-02-11 PROCEDURE — 83880 ASSAY OF NATRIURETIC PEPTIDE: CPT

## 2025-02-11 PROCEDURE — 36415 COLL VENOUS BLD VENIPUNCTURE: CPT

## 2025-02-11 PROCEDURE — 86850 RBC ANTIBODY SCREEN: CPT

## 2025-02-11 PROCEDURE — 71046 X-RAY EXAM CHEST 2 VIEWS: CPT | Mod: 26

## 2025-02-11 PROCEDURE — 99214 OFFICE O/P EST MOD 30 MIN: CPT | Mod: 25

## 2025-02-11 PROCEDURE — 85610 PROTHROMBIN TIME: CPT

## 2025-02-11 PROCEDURE — 85025 COMPLETE CBC W/AUTO DIFF WBC: CPT

## 2025-02-11 PROCEDURE — 71046 X-RAY EXAM CHEST 2 VIEWS: CPT

## 2025-02-11 PROCEDURE — 80053 COMPREHEN METABOLIC PANEL: CPT

## 2025-02-11 PROCEDURE — 83036 HEMOGLOBIN GLYCOSYLATED A1C: CPT

## 2025-02-11 NOTE — H&P PST ADULT - NSICDXPASTMEDICALHX_GEN_ALL_CORE_FT
PAST MEDICAL HISTORY:  CAD (coronary artery disease)     Gallstones     History of abdominal aortic aneurysm (AAA)     History of hepatitis C     Hypertension     Obesity

## 2025-02-11 NOTE — H&P PST ADULT - REASON FOR ADMISSION
Case Type: OP Block TimeSuite: OR HeartProceduralist: Wilfred Pascual Surgery DateTime: 02- - 0:00PAST DateTime: 02- - 10:30Procedure: CORONARY ARTERY BYPASS GRAFTING  ERP?: UnavailableLaterality: N/ALength of Procedure: 360 Minutes  Anesthesia Type: General

## 2025-02-11 NOTE — H&P PST ADULT - NSICDXPASTSURGICALHX_GEN_ALL_CORE_FT
PAST SURGICAL HISTORY:  H/O cardiac catheterization     H/O shoulder surgery     H/O Spinal surgery     History of cholecystectomy     History of tonsillectomy

## 2025-02-11 NOTE — H&P PST ADULT - NSANTHOSAYNRD_GEN_A_CORE
No. SRI screening performed.  STOP BANG Legend: 0-2 = LOW Risk; 3-4 = INTERMEDIATE Risk; 5-8 = HIGH Risk

## 2025-02-11 NOTE — H&P PST ADULT - EYES
Received high Priority Escalation via COVID Surveillance Program. Patient submitted the following abnormal data  Patient called. Patient reports the following feeling well. States his norm has been that his O2 runs 94% in the mornings and generally improves through the day. Denies any new or worsening symptoms. Denies any changes in SOB. Speaking in complete sentences without difficulty. No audible wheezes noted. Pt wearing his O2. Advised to call back with concerns or worsening symptoms. Verbalized understanding.     Reason for Disposition   [1] Follow-up call to recent contact AND [2] information only call, no triage required    Additional Information   Negative: RN needs further essential information from caller in order to complete triage   Negative: Requesting regular office appointment   Negative: [1] Caller requesting NON-URGENT health information AND [2] PCP's office is the best resource    Protocols used: INFORMATION ONLY CALL - NO TRIAGE-A-      
PERRL/EOMI/conjunctiva clear/normal

## 2025-02-11 NOTE — H&P PST ADULT - HISTORY OF PRESENT ILLNESS
59y Male presents today for presurgical testing for CORONARY ARTERY BYPASS GRAFTING. Per CTSx note dated 1/16/25 "59 year M, former smoker, here today for evaluation of their Multi vessel CAD. Patient reported SOB when going up 2 flights of stairs or when walking at a fast pace. Pt is s/p CATH which demonstrated Left main with multi-vessel involvement. PMHx of HTN, HLD, nephrolithiasis, GERD, tonsillectomy, Cholecystitis (s/p lap abdulaziz), AAA without prior surgical intervention. Symptoms include SOB on exertion can walk up 2-3 stairs then SOB, occasional CP not related to exertion. All questions and concerns were addressed with the patient. Pre-op planning was discussed with the patient. Patient was educated on the risks and alternatives to surgery. STS was calculated and discussed with the pt. Here for discussion for CABG.  ?  Cardiac Cath:  LM: distal 60-70% atherosclerotic calcified lesion. LAD: pLAD 70% stenotic lesion at the bifurcation with D1. D1: 90% ostial D1 atherosclerotic lesion. CX: pLCx 80% atherosclerotic lesion, diffuse mod to severe disease in mid and distal segments. RCA: mid segment with 80% atherosclerotic lesion  ?  NYHA class: II  CCS class: I  Pt lives home  Former smoker: 2.5 ppd 45 years and quit 3 years ago"  Patient states above is true and accurate. He presently denies pain and offers no other complaints at this time. Now for scheduled procedure.   ?  Patient/guardian denies any CP, palpitations, SOB, cough, or dysuria. No recent URI or UTI.  Stated exercise tolerance is FOS 1  SRI screen reviewed    Patient/guardian denies any recent personal exposure to COVID19. Denies any sick contacts. Patient/guardian denies travel within the past 30 days. Patient was instructed to quarantine until after procedure.    Anesthesia Alert  YES--Difficult Airway - class IV  NO--History of neck surgery or radiation  NO--Limited ROM of neck  NO--History of Malignant hyperthermia  NO--Personal or family history of Pseudocholinesterase deficiency.  NO--Prior Anesthesia Complication  NO--Latex Allergy  NO--Loose teeth - FULL UPPER AND LOWER CLIP IN DENTURES  NO--History of Rheumatoid Arthritis  NO--SRI  YES--Bleeding risk - DAILY ASA AND PLAVIX  NO--Other_____    Duke Activity Status Index (DASI) from [employer]  on 2/11/2025  ** All calculations should be rechecked by clinician prior to use **    RESULT SUMMARY:  26.95 points  The higher the score (maximum 58.2), the higher the functional status.    6.05 METs        INPUTS:  Take care of self —> 2.75 = Yes  Walk indoors —> 1.75 = Yes  Walk 1&ndash;2 blocks on level ground —> 2.75 = Yes  Climb a flight of stairs or walk up a hill —> 5.5 = Yes  Run a short distance —> 8 = Yes  Do light work around the house —> 2.7 = Yes  Do moderate work around the house —> 3.5 = Yes  Do heavy work around the house —> 0 = No  Do yardwork —> 0 = No  Have sexual relations —> 0 = No  Participate in moderate recreational activities —> 0 = No  Participate in strenuous sports —> 0 = No    Revised Cardiac Risk Index for Pre-Operative Risk from [employer]  on 2/11/2025  ** All calculations should be rechecked by clinician prior to use **    RESULT SUMMARY:  1 points  RCRI Score    6.0 %  Risk of major cardiac event      INPUTS:  High-risk surgery —> 1 = Yes  History of ischemic heart disease —> 0 = No  History of congestive heart failure —> 0 = No  History of cerebrovascular disease —> 0 = No  Pre-operative treatment with insulin —> 0 = No  Pre-operative creatinine >2 mg/dL / 176.8 µmol/L —> 0 = No    Patient/guardian states that this is their complete medical history and list of medications.  Patient/guardian understands instructions given during this visit and was given the opportunity to ask questions and have them answered. They were instructed to follow up with their surgeon/surgeon's office with any questions regarding their procedure.

## 2025-02-11 NOTE — H&P PST ADULT - NS HP PST LATEX ALLERGY
Internal Medicine History & Physical                                                                  Patient Name: Veronica Hogan    YOB: 1986    MRN: [id number]         Date of Service: 2/5/2021     Subjective     Source: patient and EMR     Preferred Language: English    PCP: Bernardo Chen DO    Chief Complaint:   Chief Complaint   Patient presents with   • Chest Pain Adult     reports lower chest pain and radiating to medial back, no injury or trauma, denies urinary complaints, tums taken without relief, denies SOB, masked.    • Back Pain     HPI:  Veronica Hogan is a 34 year old female PMH anxiety presenting to [facility] ED for abdominal pain. Describes the pain as sharp, stabbing pain in her epigastric/RUQ radiates to the middle of her back. Pain started last night and has gotten significantly worse. Associated with some nausea, no vomiting, and decreased appetite. Denies any previous history of this. Denies any fevers or chills. Family history significant for mother with cholecystectomy. RUQ US positive for cholelithiasis and mild gallbladder wall thickening. Gen surg consulted and planning for cholecystecomy.    Meds: xanax prn (takes 1-2 times a month)  PMH: anxiety  PSH: c sections and tonsillectomy  FH: DM, mother with cholecystecomy  Social: social drinker, denies any tobacco or illicit drug use  NKDA    Review of Systems:   Constitutional: Denies fever, chills, sweats   HEENT: Denies recent vision changes, double vision, nasal congestion, sore throat   Cardiovascular: Denies CP, palpitations, peripheral edema, syncope   Respiratory: Denies SOB, wheezing, cough, hemoptysis   GI: + N and abdominal pain, denies /V/D/C, hematemesis, melena, hematochezia   : Denies dysuria, hematuria   MSK: Denies muscle pain   Skin: Denies rash, pruritus   Heme/lymph: Denies LAD   Neuro: Denies numbness, tingling, weakness, headache     All other systems are negative.    History reviewed. No pertinent past  medical history.  (Not in a hospital admission)    ALLERGIES:  No Known Allergies   Past Surgical History:   Procedure Laterality Date   •  section, low transverse       No family history on file.  Social History     Tobacco Use   • Smoking status: Current Some Day Smoker   • Smokeless tobacco: Current User   • Tobacco comment: social   Substance Use Topics   • Alcohol use: Yes     Comment: social   • Drug use: Never       Objective     Vitals:    21 1255   BP: 135/83   Pulse: 70   Resp: (!) 23   Temp:        Physical Exam  General: AOx4, in mild distress, appears stated age, resting uncomfortably in bed   HEENT: normocephalic, atraumatic, EOMI, PERRL, MMM   Neck: supple, nontender, no LAD   CV: RRR, +S1 +S2, no murmurs/rubs/gallops. Carotid, radial, and dorsalis pedis pulses +2/4 b/l. No JVD   Lungs: CTAB, no crackles/rhonchi/wheezing   Abdomen: soft, significant RUQ tendernerss, +Lerma's, nondistended, BS present, tympanic to percussion. No rigidity, rebound or guarding.   Extremities: no peripheral edema, clubbing, or cyanosis   Neuro: CN 2-12 grossly intact   Skin: warm, dry, intact    No intake or output data in the 24 hours ending 21 1444     Recent Results (from the past 24 hour(s))   ECG    Collection Time: 21  8:27 AM   Result Value Ref Range    Ventricular Rate EKG/Min (BPM) 82     Atrial Rate (BPM) 82     NE-Interval (MSEC) 128     QRS-Interval (MSEC) 88     QT-Interval (MSEC) 386     QTc 451     R Axis (Degrees) 102     T Axis (Degrees) 111     REPORT TEXT       Normal sinus rhythm  Rightward axis  Borderline ECG  No previous ECGs available     Comprehensive Metabolic Panel    Collection Time: 21  9:07 AM   Result Value Ref Range    Fasting Status      Sodium 135 135 - 145 mmol/L    Potassium 3.8 3.4 - 5.1 mmol/L    Chloride 100 98 - 107 mmol/L    Carbon Dioxide 28 21 - 32 mmol/L    Anion Gap 11 10 - 20 mmol/L    Glucose 110 (H) 65 - 99 mg/dL    BUN 10 6 - 20 mg/dL     Creatinine 0.53 0.51 - 0.95 mg/dL    Glomerular Filtration Rate >90 >90 mL/min/1.73m2    BUN/ Creatinine Ratio 19 7 - 25    Calcium 9.7 8.4 - 10.2 mg/dL    Bilirubin, Total 0.4 0.2 - 1.0 mg/dL    GOT/AST 10 <=37 Units/L    GPT/ALT 19 <64 Units/L    Alkaline Phosphatase 75 45 - 117 Units/L    Albumin 4.2 3.6 - 5.1 g/dL    Protein, Total 7.8 6.4 - 8.2 g/dL    Globulin 3.6 2.0 - 4.0 g/dL    A/G Ratio 1.2 1.0 - 2.4   Troponin I Ultra Sensitive    Collection Time: 02/05/21  9:07 AM   Result Value Ref Range    Troponin I, Ultra Sensitive <0.02 <=0.04 ng/mL   CBC with Automated Differential (performable only)    Collection Time: 02/05/21  9:07 AM   Result Value Ref Range    WBC 12.8 (H) 4.2 - 11.0 K/mcL    RBC 4.47 4.00 - 5.20 mil/mcL    HGB 13.6 12.0 - 15.5 g/dL    HCT 40.1 36.0 - 46.5 %    MCV 89.7 78.0 - 100.0 fl    MCH 30.4 26.0 - 34.0 pg    MCHC 33.9 32.0 - 36.5 g/dL    RDW-CV 11.9 11.0 - 15.0 %    RDW-SD 39.0 39.0 - 50.0 fL     140 - 450 K/mcL    NRBC 0 <=0 /100 WBC    Neutrophil, Percent 90 %    Lymphocytes, Percent 7 %    Mono, Percent 3 %    Eosinophils, Percent 0 %    Basophils, Percent 0 %    Immature Granulocytes 0 %    Absolute Neutrophils 11.3 (H) 1.8 - 7.7 K/mcL    Absolute Lymphocytes 0.9 (L) 1.0 - 4.8 K/mcL    Absolute Monocytes 0.4 0.3 - 0.9 K/mcL    Absolute Eosinophils  0.0 0.0 - 0.5 K/mcL    Absolute Basophils 0.0 0.0 - 0.3 K/mcL    Absolute Immmature Granulocytes 0.0 0.0 - 0.2 K/mcL   Lipase    Collection Time: 02/05/21  9:07 AM   Result Value Ref Range    Lipase 81 73 - 393 Units/L   HCG POC    Collection Time: 02/05/21 10:33 AM   Result Value Ref Range    HCG, URINE - POINT OF CARE Negative Negative       Current Facility-Administered Medications   Medication Dose Route Frequency Provider Last Rate Last Admin   • lactated ringers bolus 1,000 mL  1,000 mL Intravenous Once Angie Damico  mL/hr at 02/05/21 1417 1,000 mL at 02/05/21 1417     No current outpatient medications on file.           Assessment and Plan   Veronica Hogan is a 34 year old female PMH anxiety presenting to [facility] ED for abdominal pain.    Active Hospital Problems    Diagnosis   • Acute cholecystitis  - likely in early stages, liver enzymes wnl  - RUQ US with stones and mild gallbladder wall thickening  - IV morphine 4 mg q6h prn, tylenol, ibuprofen for pain control  - start ceftriaxone 1 g qd and flagyl 500 mg q8h  - gen surg conulted, plan for OR today   • Anxiety  - hold home xanax         FENA  Fluids: LR bolus  Electrolytes: replace PRN  Nutrition: NPO except meds  Activity: advance to baseline    PPx:  VTE: SCDs  GI: not indicated    Code Status: FULL CODE    Dispo: obs    Pt discussed with Dr rKystal Jeffers MD  Resident Physician  Dept of Internal Medicine  [employer]:     IM attending  I saw and evaluated the patient, I have personally reviewed labs, I have reviewed plan of care by Dr. Jeffers and agree with assessment and plan of care below, patient seen post-operatively, tolerated general diet, pain controlled, had bowel movement, plan to d/c home     No

## 2025-02-12 DIAGNOSIS — I25.10 ATHEROSCLEROTIC HEART DISEASE OF NATIVE CORONARY ARTERY WITHOUT ANGINA PECTORIS: ICD-10-CM

## 2025-02-18 VITALS
OXYGEN SATURATION: 98 % | RESPIRATION RATE: 20 BRPM | DIASTOLIC BLOOD PRESSURE: 100 MMHG | HEIGHT: 72.99 IN | SYSTOLIC BLOOD PRESSURE: 157 MMHG | HEART RATE: 71 BPM | WEIGHT: 268.96 LBS | TEMPERATURE: 98 F

## 2025-02-18 NOTE — PRE-OP CHECKLIST - ADDITIONAL CONSENTS
Pt. In ED via EMS. Pt. Stated they bladder surgery this morning around 9 am for interstitial cystitis and was told to go to the ER if they were peeing blood clots. Patient stated they are in 10/10 pain and peeing blood clots. Patient stated they took Tramadol about an hour ago without relief.
TRANSFUSION

## 2025-02-19 ENCOUNTER — APPOINTMENT (OUTPATIENT)
Dept: CARDIOTHORACIC SURGERY | Facility: HOSPITAL | Age: 60
End: 2025-02-19

## 2025-02-19 ENCOUNTER — RESULT REVIEW (OUTPATIENT)
Age: 60
End: 2025-02-19

## 2025-02-19 ENCOUNTER — INPATIENT (INPATIENT)
Facility: HOSPITAL | Age: 60
LOS: 11 days | Discharge: HOME HEALTH | DRG: 303 | End: 2025-03-03
Attending: THORACIC SURGERY (CARDIOTHORACIC VASCULAR SURGERY) | Admitting: THORACIC SURGERY (CARDIOTHORACIC VASCULAR SURGERY)
Payer: MEDICARE

## 2025-02-19 VITALS — WEIGHT: 268.96 LBS | HEIGHT: 72.83 IN

## 2025-02-19 DIAGNOSIS — Z98.890 OTHER SPECIFIED POSTPROCEDURAL STATES: Chronic | ICD-10-CM

## 2025-02-19 DIAGNOSIS — Z90.89 ACQUIRED ABSENCE OF OTHER ORGANS: Chronic | ICD-10-CM

## 2025-02-19 DIAGNOSIS — Z90.49 ACQUIRED ABSENCE OF OTHER SPECIFIED PARTS OF DIGESTIVE TRACT: Chronic | ICD-10-CM

## 2025-02-19 DIAGNOSIS — I25.10 ATHEROSCLEROTIC HEART DISEASE OF NATIVE CORONARY ARTERY WITHOUT ANGINA PECTORIS: ICD-10-CM

## 2025-02-19 LAB
ALBUMIN SERPL ELPH-MCNC: 3.9 G/DL — SIGNIFICANT CHANGE UP (ref 3.5–5.2)
ALP SERPL-CCNC: 37 U/L — SIGNIFICANT CHANGE UP (ref 30–115)
ALT FLD-CCNC: 37 U/L — SIGNIFICANT CHANGE UP (ref 0–41)
ANION GAP SERPL CALC-SCNC: 14 MMOL/L — SIGNIFICANT CHANGE UP (ref 7–14)
APTT BLD: 25.6 SEC — LOW (ref 27–39.2)
APTT BLD: 49.3 SEC — HIGH (ref 27–39.2)
AST SERPL-CCNC: 74 U/L — HIGH (ref 0–41)
BASOPHILS # BLD AUTO: 0.04 K/UL — SIGNIFICANT CHANGE UP (ref 0–0.2)
BASOPHILS # BLD AUTO: 0.04 K/UL — SIGNIFICANT CHANGE UP (ref 0–0.2)
BASOPHILS NFR BLD AUTO: 0.3 % — SIGNIFICANT CHANGE UP (ref 0–1)
BASOPHILS NFR BLD AUTO: 0.3 % — SIGNIFICANT CHANGE UP (ref 0–1)
BILIRUB SERPL-MCNC: 3.4 MG/DL — HIGH (ref 0.2–1.2)
BUN SERPL-MCNC: 17 MG/DL — SIGNIFICANT CHANGE UP (ref 10–20)
CALCIUM SERPL-MCNC: 8.4 MG/DL — SIGNIFICANT CHANGE UP (ref 8.4–10.4)
CHLORIDE SERPL-SCNC: 106 MMOL/L — SIGNIFICANT CHANGE UP (ref 98–110)
CO2 SERPL-SCNC: 23 MMOL/L — SIGNIFICANT CHANGE UP (ref 17–32)
CREAT SERPL-MCNC: 1.2 MG/DL — SIGNIFICANT CHANGE UP (ref 0.7–1.5)
D DIMER BLD IA.RAPID-MCNC: 873 NG/ML DDU — HIGH
EGFR: 69 ML/MIN/1.73M2 — SIGNIFICANT CHANGE UP
EGFR: 69 ML/MIN/1.73M2 — SIGNIFICANT CHANGE UP
EOSINOPHIL # BLD AUTO: 0.11 K/UL — SIGNIFICANT CHANGE UP (ref 0–0.7)
EOSINOPHIL # BLD AUTO: 0.14 K/UL — SIGNIFICANT CHANGE UP (ref 0–0.7)
EOSINOPHIL NFR BLD AUTO: 0.8 % — SIGNIFICANT CHANGE UP (ref 0–8)
EOSINOPHIL NFR BLD AUTO: 1.1 % — SIGNIFICANT CHANGE UP (ref 0–8)
FIBRINOGEN PPP-MCNC: 190 MG/DL — LOW (ref 200–435)
GAS PNL BLDA: SIGNIFICANT CHANGE UP
GLUCOSE BLDC GLUCOMTR-MCNC: 111 MG/DL — HIGH (ref 70–99)
GLUCOSE BLDC GLUCOMTR-MCNC: 135 MG/DL — HIGH (ref 70–99)
GLUCOSE SERPL-MCNC: 123 MG/DL — HIGH (ref 70–99)
HCT VFR BLD CALC: 25.7 % — LOW (ref 42–52)
HCT VFR BLD CALC: 28.8 % — LOW (ref 42–52)
HCT VFR BLD CALC: 31.7 % — LOW (ref 42–52)
HGB BLD-MCNC: 11 G/DL — LOW (ref 14–18)
HGB BLD-MCNC: 9 G/DL — LOW (ref 14–18)
HGB BLD-MCNC: 9.8 G/DL — LOW (ref 14–18)
IMM GRANULOCYTES NFR BLD AUTO: 0.6 % — HIGH (ref 0.1–0.3)
IMM GRANULOCYTES NFR BLD AUTO: 0.8 % — HIGH (ref 0.1–0.3)
INR BLD: 1.26 RATIO — SIGNIFICANT CHANGE UP (ref 0.65–1.3)
INR BLD: 1.31 RATIO — HIGH (ref 0.65–1.3)
LYMPHOCYTES # BLD AUTO: 1.9 K/UL — SIGNIFICANT CHANGE UP (ref 1.2–3.4)
LYMPHOCYTES # BLD AUTO: 1.95 K/UL — SIGNIFICANT CHANGE UP (ref 1.2–3.4)
LYMPHOCYTES # BLD AUTO: 13.4 % — LOW (ref 20.5–51.1)
LYMPHOCYTES # BLD AUTO: 14.6 % — LOW (ref 20.5–51.1)
MAGNESIUM SERPL-MCNC: 1.7 MG/DL — LOW (ref 1.8–2.4)
MCHC RBC-ENTMCNC: 29.8 PG — SIGNIFICANT CHANGE UP (ref 27–31)
MCHC RBC-ENTMCNC: 29.9 PG — SIGNIFICANT CHANGE UP (ref 27–31)
MCHC RBC-ENTMCNC: 30.1 PG — SIGNIFICANT CHANGE UP (ref 27–31)
MCHC RBC-ENTMCNC: 34 G/DL — SIGNIFICANT CHANGE UP (ref 32–37)
MCHC RBC-ENTMCNC: 34.7 G/DL — SIGNIFICANT CHANGE UP (ref 32–37)
MCHC RBC-ENTMCNC: 35 G/DL — SIGNIFICANT CHANGE UP (ref 32–37)
MCV RBC AUTO: 85.9 FL — SIGNIFICANT CHANGE UP (ref 80–94)
MCV RBC AUTO: 86 FL — SIGNIFICANT CHANGE UP (ref 80–94)
MCV RBC AUTO: 87.8 FL — SIGNIFICANT CHANGE UP (ref 80–94)
MONOCYTES # BLD AUTO: 0.34 K/UL — SIGNIFICANT CHANGE UP (ref 0.1–0.6)
MONOCYTES # BLD AUTO: 1.08 K/UL — HIGH (ref 0.1–0.6)
MONOCYTES NFR BLD AUTO: 2.6 % — SIGNIFICANT CHANGE UP (ref 1.7–9.3)
MONOCYTES NFR BLD AUTO: 7.4 % — SIGNIFICANT CHANGE UP (ref 1.7–9.3)
NEUTROPHILS # BLD AUTO: 10.48 K/UL — HIGH (ref 1.4–6.5)
NEUTROPHILS # BLD AUTO: 11.29 K/UL — HIGH (ref 1.4–6.5)
NEUTROPHILS NFR BLD AUTO: 77.3 % — HIGH (ref 42.2–75.2)
NEUTROPHILS NFR BLD AUTO: 80.8 % — HIGH (ref 42.2–75.2)
NRBC BLD AUTO-RTO: 0 /100 WBCS — SIGNIFICANT CHANGE UP (ref 0–0)
PLATELET # BLD AUTO: 132 K/UL — SIGNIFICANT CHANGE UP (ref 130–400)
PLATELET # BLD AUTO: 134 K/UL — SIGNIFICANT CHANGE UP (ref 130–400)
PLATELET # BLD AUTO: 139 K/UL — SIGNIFICANT CHANGE UP (ref 130–400)
PMV BLD: 9.2 FL — SIGNIFICANT CHANGE UP (ref 7.4–10.4)
PMV BLD: 9.6 FL — SIGNIFICANT CHANGE UP (ref 7.4–10.4)
PMV BLD: 9.9 FL — SIGNIFICANT CHANGE UP (ref 7.4–10.4)
POTASSIUM SERPL-MCNC: 4.2 MMOL/L — SIGNIFICANT CHANGE UP (ref 3.5–5)
POTASSIUM SERPL-SCNC: 4.2 MMOL/L — SIGNIFICANT CHANGE UP (ref 3.5–5)
PROT SERPL-MCNC: 5.3 G/DL — LOW (ref 6–8)
PROTHROM AB SERPL-ACNC: 14.9 SEC — HIGH (ref 9.95–12.87)
PROTHROM AB SERPL-ACNC: 15.5 SEC — HIGH (ref 9.95–12.87)
RBC # BLD: 2.99 M/UL — LOW (ref 4.7–6.1)
RBC # BLD: 3.28 M/UL — LOW (ref 4.7–6.1)
RBC # BLD: 3.69 M/UL — LOW (ref 4.7–6.1)
RBC # FLD: 12.2 % — SIGNIFICANT CHANGE UP (ref 11.5–14.5)
RBC # FLD: 12.4 % — SIGNIFICANT CHANGE UP (ref 11.5–14.5)
RBC # FLD: 12.8 % — SIGNIFICANT CHANGE UP (ref 11.5–14.5)
SODIUM SERPL-SCNC: 143 MMOL/L — SIGNIFICANT CHANGE UP (ref 135–146)
WBC # BLD: 10.89 K/UL — HIGH (ref 4.8–10.8)
WBC # BLD: 12.98 K/UL — HIGH (ref 4.8–10.8)
WBC # BLD: 14.58 K/UL — HIGH (ref 4.8–10.8)
WBC # FLD AUTO: 10.89 K/UL — HIGH (ref 4.8–10.8)
WBC # FLD AUTO: 12.98 K/UL — HIGH (ref 4.8–10.8)
WBC # FLD AUTO: 14.58 K/UL — HIGH (ref 4.8–10.8)

## 2025-02-19 PROCEDURE — 94640 AIRWAY INHALATION TREATMENT: CPT

## 2025-02-19 PROCEDURE — 82803 BLOOD GASES ANY COMBINATION: CPT

## 2025-02-19 PROCEDURE — 33533 CABG ARTERIAL SINGLE: CPT

## 2025-02-19 PROCEDURE — 36415 COLL VENOUS BLD VENIPUNCTURE: CPT

## 2025-02-19 PROCEDURE — 97530 THERAPEUTIC ACTIVITIES: CPT | Mod: GP

## 2025-02-19 PROCEDURE — P9045: CPT | Mod: JZ

## 2025-02-19 PROCEDURE — 86965 POOLING BLOOD PLATELETS: CPT

## 2025-02-19 PROCEDURE — 93010 ELECTROCARDIOGRAM REPORT: CPT

## 2025-02-19 PROCEDURE — P9012: CPT

## 2025-02-19 PROCEDURE — 82962 GLUCOSE BLOOD TEST: CPT

## 2025-02-19 PROCEDURE — 33519 CABG ARTERY-VEIN THREE: CPT

## 2025-02-19 PROCEDURE — 85018 HEMOGLOBIN: CPT

## 2025-02-19 PROCEDURE — 94760 N-INVAS EAR/PLS OXIMETRY 1: CPT

## 2025-02-19 PROCEDURE — 93005 ELECTROCARDIOGRAM TRACING: CPT

## 2025-02-19 PROCEDURE — P9100: CPT

## 2025-02-19 PROCEDURE — 84295 ASSAY OF SERUM SODIUM: CPT

## 2025-02-19 PROCEDURE — 71046 X-RAY EXAM CHEST 2 VIEWS: CPT

## 2025-02-19 PROCEDURE — 85027 COMPLETE CBC AUTOMATED: CPT

## 2025-02-19 PROCEDURE — C1889: CPT

## 2025-02-19 PROCEDURE — 33533 CABG ARTERIAL SINGLE: CPT | Mod: AS

## 2025-02-19 PROCEDURE — 85379 FIBRIN DEGRADATION QUANT: CPT

## 2025-02-19 PROCEDURE — 36430 TRANSFUSION BLD/BLD COMPNT: CPT

## 2025-02-19 PROCEDURE — C1894: CPT

## 2025-02-19 PROCEDURE — 85384 FIBRINOGEN ACTIVITY: CPT

## 2025-02-19 PROCEDURE — P9037: CPT

## 2025-02-19 PROCEDURE — 71045 X-RAY EXAM CHEST 1 VIEW: CPT

## 2025-02-19 PROCEDURE — 84132 ASSAY OF SERUM POTASSIUM: CPT

## 2025-02-19 PROCEDURE — 85025 COMPLETE CBC W/AUTO DIFF WBC: CPT

## 2025-02-19 PROCEDURE — 86923 COMPATIBILITY TEST ELECTRIC: CPT

## 2025-02-19 PROCEDURE — 83735 ASSAY OF MAGNESIUM: CPT

## 2025-02-19 PROCEDURE — 80048 BASIC METABOLIC PNL TOTAL CA: CPT

## 2025-02-19 PROCEDURE — 80053 COMPREHEN METABOLIC PANEL: CPT

## 2025-02-19 PROCEDURE — 82330 ASSAY OF CALCIUM: CPT

## 2025-02-19 PROCEDURE — 97110 THERAPEUTIC EXERCISES: CPT | Mod: GP

## 2025-02-19 PROCEDURE — 94010 BREATHING CAPACITY TEST: CPT

## 2025-02-19 PROCEDURE — 85730 THROMBOPLASTIN TIME PARTIAL: CPT

## 2025-02-19 PROCEDURE — C1751: CPT

## 2025-02-19 PROCEDURE — 99291 CRITICAL CARE FIRST HOUR: CPT

## 2025-02-19 PROCEDURE — 85014 HEMATOCRIT: CPT

## 2025-02-19 PROCEDURE — 86891 AUTOLOGOUS BLOOD OP SALVAGE: CPT

## 2025-02-19 PROCEDURE — 33519 CABG ARTERY-VEIN THREE: CPT | Mod: AS

## 2025-02-19 PROCEDURE — 83605 ASSAY OF LACTIC ACID: CPT

## 2025-02-19 PROCEDURE — 33508 ENDOSCOPIC VEIN HARVEST: CPT | Mod: AS,59

## 2025-02-19 PROCEDURE — C1769: CPT

## 2025-02-19 PROCEDURE — 94002 VENT MGMT INPAT INIT DAY: CPT

## 2025-02-19 PROCEDURE — 71045 X-RAY EXAM CHEST 1 VIEW: CPT | Mod: 26

## 2025-02-19 PROCEDURE — 85610 PROTHROMBIN TIME: CPT

## 2025-02-19 PROCEDURE — 93318 ECHO TRANSESOPHAGEAL INTRAOP: CPT

## 2025-02-19 PROCEDURE — 97116 GAIT TRAINING THERAPY: CPT | Mod: GP

## 2025-02-19 PROCEDURE — 33508 ENDOSCOPIC VEIN HARVEST: CPT | Mod: 59

## 2025-02-19 PROCEDURE — 97162 PT EVAL MOD COMPLEX 30 MIN: CPT | Mod: GP

## 2025-02-19 RX ORDER — CEFAZOLIN SODIUM IN 0.9 % NACL 3 G/100 ML
2000 INTRAVENOUS SOLUTION, PIGGYBACK (ML) INTRAVENOUS EVERY 8 HOURS
Refills: 0 | Status: COMPLETED | OUTPATIENT
Start: 2025-02-19 | End: 2025-02-21

## 2025-02-19 RX ORDER — NOREPINEPHRINE BITARTRATE 8 MG
0.05 SOLUTION INTRAVENOUS
Qty: 8 | Refills: 0 | Status: DISCONTINUED | OUTPATIENT
Start: 2025-02-19 | End: 2025-02-21

## 2025-02-19 RX ORDER — MAGNESIUM SULFATE 500 MG/ML
2 SYRINGE (ML) INJECTION ONCE
Refills: 0 | Status: COMPLETED | OUTPATIENT
Start: 2025-02-19 | End: 2025-02-19

## 2025-02-19 RX ORDER — SENNA 187 MG
2 TABLET ORAL AT BEDTIME
Refills: 0 | Status: DISCONTINUED | OUTPATIENT
Start: 2025-02-20 | End: 2025-02-26

## 2025-02-19 RX ORDER — DEXTROSE 50 % IN WATER 50 %
25 SYRINGE (ML) INTRAVENOUS
Refills: 0 | Status: DISCONTINUED | OUTPATIENT
Start: 2025-02-19 | End: 2025-03-01

## 2025-02-19 RX ORDER — DEXTROSE 50 % IN WATER 50 %
50 SYRINGE (ML) INTRAVENOUS
Refills: 0 | Status: DISCONTINUED | OUTPATIENT
Start: 2025-02-19 | End: 2025-03-01

## 2025-02-19 RX ORDER — MELOXICAM 7.5 MG
0 TABLET ORAL
Qty: 0 | Refills: 0 | DISCHARGE

## 2025-02-19 RX ORDER — DEXMEDETOMIDINE HYDROCHLORIDE IN SODIUM CHLORIDE 4 UG/ML
0.5 INJECTION INTRAVENOUS
Qty: 400 | Refills: 0 | Status: DISCONTINUED | OUTPATIENT
Start: 2025-02-19 | End: 2025-02-20

## 2025-02-19 RX ORDER — PROPOFOL 10 MG/ML
15 INJECTION, EMULSION INTRAVENOUS
Qty: 1000 | Refills: 0 | Status: DISCONTINUED | OUTPATIENT
Start: 2025-02-19 | End: 2025-02-20

## 2025-02-19 RX ORDER — NICARDIPINE HCL 30 MG
5 CAPSULE ORAL
Qty: 40 | Refills: 0 | Status: DISCONTINUED | OUTPATIENT
Start: 2025-02-19 | End: 2025-02-21

## 2025-02-19 RX ORDER — NITROGLYCERIN 20 MG/G
15 OINTMENT TOPICAL
Qty: 50 | Refills: 0 | Status: DISCONTINUED | OUTPATIENT
Start: 2025-02-19 | End: 2025-02-22

## 2025-02-19 RX ORDER — BISACODYL 5 MG
10 TABLET, DELAYED RELEASE (ENTERIC COATED) ORAL ONCE
Refills: 0 | Status: DISCONTINUED | OUTPATIENT
Start: 2025-02-21 | End: 2025-03-01

## 2025-02-19 RX ORDER — ACETAMINOPHEN 500 MG/5ML
1000 LIQUID (ML) ORAL ONCE
Refills: 0 | Status: COMPLETED | OUTPATIENT
Start: 2025-02-19 | End: 2025-02-20

## 2025-02-19 RX ORDER — ALBUMIN (HUMAN) 12.5 G/50ML
500 INJECTION, SOLUTION INTRAVENOUS ONCE
Refills: 0 | Status: COMPLETED | OUTPATIENT
Start: 2025-02-19 | End: 2025-02-19

## 2025-02-19 RX ORDER — DEXMEDETOMIDINE HYDROCHLORIDE IN SODIUM CHLORIDE 4 UG/ML
0.02 INJECTION INTRAVENOUS
Qty: 200 | Refills: 0 | Status: DISCONTINUED | OUTPATIENT
Start: 2025-02-19 | End: 2025-02-19

## 2025-02-19 RX ORDER — OMEPRAZOLE 20 MG/1
1 CAPSULE, DELAYED RELEASE ORAL
Refills: 0 | DISCHARGE

## 2025-02-19 RX ORDER — OXYCODONE HYDROCHLORIDE 30 MG/1
5 TABLET ORAL EVERY 4 HOURS
Refills: 0 | Status: DISCONTINUED | OUTPATIENT
Start: 2025-02-19 | End: 2025-02-22

## 2025-02-19 RX ORDER — OXYCODONE HYDROCHLORIDE 30 MG/1
10 TABLET ORAL EVERY 4 HOURS
Refills: 0 | Status: DISCONTINUED | OUTPATIENT
Start: 2025-02-19 | End: 2025-02-22

## 2025-02-19 RX ORDER — VASOPRESSIN 20 [USP'U]/ML
0.04 INJECTION INTRAVENOUS
Qty: 40 | Refills: 0 | Status: DISCONTINUED | OUTPATIENT
Start: 2025-02-19 | End: 2025-02-21

## 2025-02-19 RX ORDER — POLYETHYLENE GLYCOL 3350 17 G/17G
17 POWDER, FOR SOLUTION ORAL DAILY
Refills: 0 | Status: DISCONTINUED | OUTPATIENT
Start: 2025-02-20 | End: 2025-02-26

## 2025-02-19 RX ORDER — HYDROMORPHONE/SOD CHLOR,ISO/PF 2 MG/10 ML
0.5 SYRINGE (ML) INJECTION EVERY 6 HOURS
Refills: 0 | Status: DISCONTINUED | OUTPATIENT
Start: 2025-02-19 | End: 2025-02-21

## 2025-02-19 RX ORDER — VANCOMYCIN HCL IN 5 % DEXTROSE 1.5G/250ML
1000 PLASTIC BAG, INJECTION (ML) INTRAVENOUS EVERY 12 HOURS
Refills: 0 | Status: COMPLETED | OUTPATIENT
Start: 2025-02-19 | End: 2025-02-21

## 2025-02-19 RX ORDER — ASPIRIN 325 MG
300 TABLET ORAL ONCE
Refills: 0 | Status: COMPLETED | OUTPATIENT
Start: 2025-02-19 | End: 2025-02-19

## 2025-02-19 RX ORDER — ACETAMINOPHEN 500 MG/5ML
650 LIQUID (ML) ORAL EVERY 6 HOURS
Refills: 0 | Status: DISCONTINUED | OUTPATIENT
Start: 2025-02-19 | End: 2025-02-20

## 2025-02-19 RX ADMIN — NOREPINEPHRINE BITARTRATE 11.4 MICROGRAM(S)/KG/MIN: 8 SOLUTION at 19:30

## 2025-02-19 RX ADMIN — Medication 5 UNIT(S)/HR: at 19:32

## 2025-02-19 RX ADMIN — Medication 25 GRAM(S): at 19:30

## 2025-02-19 RX ADMIN — Medication 5 UNIT(S)/HR: at 18:43

## 2025-02-19 RX ADMIN — DEXMEDETOMIDINE HYDROCHLORIDE IN SODIUM CHLORIDE 15.3 MICROGRAM(S)/KG/HR: 4 INJECTION INTRAVENOUS at 19:31

## 2025-02-19 RX ADMIN — Medication 20 MILLILITER(S): at 18:43

## 2025-02-19 RX ADMIN — ALBUMIN (HUMAN) 100 MILLILITER(S): 12.5 INJECTION, SOLUTION INTRAVENOUS at 21:44

## 2025-02-19 RX ADMIN — NOREPINEPHRINE BITARTRATE 11.4 MICROGRAM(S)/KG/MIN: 8 SOLUTION at 18:43

## 2025-02-19 RX ADMIN — Medication 100 MILLIGRAM(S): at 21:43

## 2025-02-19 RX ADMIN — Medication 250 MILLIGRAM(S): at 18:48

## 2025-02-19 RX ADMIN — Medication 20 MILLIGRAM(S): at 18:48

## 2025-02-19 RX ADMIN — DEXMEDETOMIDINE HYDROCHLORIDE IN SODIUM CHLORIDE 15.3 MICROGRAM(S)/KG/HR: 4 INJECTION INTRAVENOUS at 20:25

## 2025-02-19 NOTE — BRIEF OPERATIVE NOTE - SPECIMENS
The patient needs the current OT progress noted fsigned so that that she can continue with OT. Her therapist will refax the progress notes today.
None

## 2025-02-19 NOTE — CHART NOTE - NSCHARTNOTEFT_GEN_A_CORE
PACU ANESTHESIA ADMISSION NOTE      Procedure: CABG, with TAWNY  LIMA to LAD, RSVG to Diag, OM and RCA      Post op diagnosis:  3-vessel CAD        ____  Intubated  TV:______       Rate: ______      FiO2: ______    __x__  Patent Airway    __x__  Full return of protective reflexes    __x__  Full recovery from anesthesia / back to baseline status    Vitals:  T(C): --  HR: --  BP: --  RR: --  SpO2: --    Mental Status:  ____ Awake   _____ Alert   _____ Drowsy   __x___ Sedated    Nausea/Vomiting:  __x__ NO  ______Yes,   See Post - Op Orders          Pain Scale (0-10):  _____    Treatment: ____ None    __x__ See Post - Op/PCA Orders    Post - Operative Fluids:   ____ Oral   __x__ See Post - Op Orders    Plan: Discharge:   ____Home       _____Floor     _____Critical Care    __x___  Other:_________________    Comments: Patient had smooth intraoperative event, no anesthesia complication.  ICU Vital signs: HR:  72          BP:  108      /  68        RR: 14            O2 Sat:  98     %     Temp 37C    Intubated and sedated in the ICU. On pressor support. Care endorsed to ICU team.

## 2025-02-19 NOTE — DISCHARGE NOTE PROVIDER - NSDCFUSCHEDAPPT_GEN_ALL_CORE_FT
Wilfred Martin  [facility]  CTS[facility]G [address]  Scheduled Appointment: 03/06/2025

## 2025-02-19 NOTE — DISCHARGE NOTE PROVIDER - NSDCCPTREATMENT_GEN_ALL_CORE_FT
PRINCIPAL PROCEDURE  Procedure: CABG, with TAWNY  Findings and Treatment: Activities/Restrictions  1.	Do not – drive, lift, pull or push anything over 10 pounds for 8 weeks.  2.	Shower every night and carefully wash wound, pat dry.  Cover is wound is draining with dry sterile dressing. No baths or swimming for two months.   3.	Apply support stockings /ace wraps to legs as soon as you get out of bed in the morning, remove in evening.   4.	Do progressive walking exercises every day, gradually increasing to 30 to 40 minutes/day, five days a week and incentive spirometer 10 times every hour while awake  5.	DO NOT DRIVE OR CONSUME ALCOHOL WHILE TAKING PAIN MEDICATION.  Contact your Physician promptly if:  1.	 Signs of wound infection, such as increasing redness, swelling, pain or drainage from incision.  2.	Progressive shortness of breath or increasing difficulty with breathing when lying down.  3.	Excessive nausea, vomiting, diarrhea or coughing.  4.	Increase swelling of legs that does not resolve with leg elevation.  5.	Chest pain that spreads to arms, jaw or back or sudden development of numbness, weakness, difficulty speaking or facial droop – Call 911.  6.	Diabetics who are unable to keep finger stick glucose under 150 for three consecutive readings.  Instructions:  1.	 Keep a daily log for Temperature, pulse, blood pressure, and weight twice a day and Glucose if diabetic with each meal. Call office for Temp > 101, pulse greater than 110 or less than 55, BP first # greater than 160 or less than 100, 3 pound weight gain in 1 day or 5 pounds in 3 days  2.	Hold pillow to chest and grab elbows when you need to cough.

## 2025-02-19 NOTE — DISCHARGE NOTE PROVIDER - PROVIDER TOKENS
PROVIDER:[TOKEN:[[id number]]],PROVIDER:[TOKEN:[[id number]]] PROVIDER:[TOKEN:[[id number]],SCHEDULEDAPPT:[03/06/2025],SCHEDULEDAPPTTIME:[12:15 PM]],PROVIDER:[TOKEN:[[id number]]]

## 2025-02-19 NOTE — PRE-ANESTHESIA EVALUATION ADULT - NSANTHOSAYNRD_GEN_A_CORE
66 yo F pmhx of afib, hypothyroidism, HTN, from [facility] for emergent Neuroendovascular intervention for symptomatic basilar occlusion with diminished flow to b/l PCA and worsening neuro exam. She underwent emergent Intervention with TICI 1 to 2a.  Patient also with new onset Afib RVR.     1. Basilar occlusion s/p thrombolysis c/b ICH: Neuro recds appreciated. Repeat MRI results (areas of lt thalamic hemorrhages, small subacute infarction Lt midbrain) noted. Cont monitoring in stroke unit. BP control, SBP . PT/Rehab consult. Cont lipitor  2. Afib RVR, Tachy-shiva syndrome: Pt is status post loop recorder. Episode of afib yesterday evening. F/U EP cardiology. Cont amiodarone 200 qD, metoprolol 12.5 BID  3. Hypothyroidism: Cont synthroid  4. HTN: On lisinopril, metoprolol     GI/DVT PPX    #Progress Note Handoff  Pending (specify): EP f/u, PT/OT/rehab  Family discussion: d/w patient and family by bedside regarding tx plan  Disposition: TBD, likely inpt rehab No. SRI screening performed.  STOP BANG Legend: 0-2 = LOW Risk; 3-4 = INTERMEDIATE Risk; 5-8 = HIGH Risk

## 2025-02-19 NOTE — PROGRESS NOTE ADULT - SUBJECTIVE AND OBJECTIVE BOX
CTU Attending Progress Daily Note     19 Feb 2025 21:25  Procedure: CABG  POD#: 0    He has history of Hypertension  Gallstones  History of abdominal aortic aneurysm (AAA)  Obesity  CAD (coronary artery disease)  History of hepatitis C      HPI:   59y Male PMH of HTN, HLD, nephrolithiasis, GERD, Cholecystitis (s/p lap abdulaziz), AAA without prior surgical intervention, obesity, former smoker (smoked for 40 years, quit 3 years ago), chronic back pain. Patient reported SOB when going up 2 flights of stairs or when walking at a fast pace. CCTA revealed CAC of 847 with multivessel disease, followed by CT Fractional flow Reserve (FFR-CT) confirming findings. Presented today for elective LHC which demonstrated Left main with multi-vessel involvement. On 02/19/25, he presented for elective CABG.          OBJECTIVE:  ICU Vital Signs Last 24 Hrs  T(C): 36.6 (19 Feb 2025 21:00), Max: 36.6 (19 Feb 2025 20:00)  T(F): 97.9 (19 Feb 2025 21:00), Max: 97.9 (19 Feb 2025 20:00)  HR: 71 (19 Feb 2025 21:15) (65 - 71)  BP: 104/55 (19 Feb 2025 17:50) (104/55 - 104/55)  BP(mean): 75 (19 Feb 2025 17:50) (75 - 75)  ABP: 120/68 (19 Feb 2025 21:15) (86/56 - 135/73)  ABP(mean): 87 (19 Feb 2025 21:15) (65 - 96)  RR: 19 (19 Feb 2025 21:15) (0 - 25)  SpO2: 97% (19 Feb 2025 21:15) (93% - 100%)    O2 Parameters below as of 19 Feb 2025 21:00  Patient On (Oxygen Delivery Method): ventilator    O2 Concentration (%): 70      I&O's Summary    19 Feb 2025 07:01  -  19 Feb 2025 21:25  --------------------------------------------------------  IN: 615.6 mL / OUT: 538 mL / NET: 77.6 mL      I&O's Detail    19 Feb 2025 07:01  -  19 Feb 2025 21:25  --------------------------------------------------------  IN:    Dexmedetomidine: 91.4 mL    Dexmedetomidine: 18.3 mL    Insulin: 10 mL    IV PiggyBack: 350 mL    Norepinephrine: 45.9 mL    sodium chloride 0.9%: 100 mL  Total IN: 615.6 mL    OUT:    Chest Tube (mL): 164 mL    Chest Tube (mL): 34 mL    Indwelling Catheter - Urethral (mL): 340 mL    Nasogastric/Oral tube (mL): 0 mL  Total OUT: 538 mL    Total NET: 77.6 mL        Adult Advanced Hemodynamics Last 24 Hrs  CVP(mm Hg): 12 (19 Feb 2025 21:15) (12 - 21)  CVP(cm H2O): --  CO: 4.4 (19 Feb 2025 21:00) (4.4 - 5.6)  CI: 1.7 (19 Feb 2025 21:00) (1.7 - 2.2)  PA: 37/24 (19 Feb 2025 21:15) (37/24 - 45/29)  PA(mean): 30 (19 Feb 2025 21:15) (26 - 36)  PCWP: --  SVR: 799 (19 Feb 2025 21:00) (799 - 913)  SVRI: 2068 (19 Feb 2025 21:00) (2068 - 2324)  PVR: --  PVRI: --  Mode: AC/ CMV (Assist Control/ Continuous Mandatory Ventilation)  RR (machine): 16  TV (machine): 600  FiO2: 80  PEEP: 12  ITime: 0.9  MAP: 16  PIP: 30    CAPILLARY BLOOD GLUCOSE      POCT Blood Glucose.: 135 mg/dL (19 Feb 2025 17:40)    LABS:  ABG - ( 19 Feb 2025 20:58 )  pH, Arterial: 7.40  pH, Blood: x     /  pCO2: 41    /  pO2: 72    / HCO3: 25    / Base Excess: 0.5   /  SaO2: 96.8                                    9.8    10.89 )-----------( 132      ( 19 Feb 2025 18:08 )             28.8     02-19    143  |  106  |  17  ----------------------------<  123[H]  4.2   |  23  |  1.2    Ca    8.4      19 Feb 2025 18:08  Mg     1.7     02-19    TPro  5.3[L]  /  Alb  3.9  /  TBili  3.4[H]  /  DBili  x   /  AST  74[H]  /  ALT  37  /  AlkPhos  37  02-19    PT/INR - ( 19 Feb 2025 18:08 )   PT: 14.90 sec;   INR: 1.26 ratio         PTT - ( 19 Feb 2025 18:08 )  PTT:49.3 sec  Urinalysis Basic - ( 19 Feb 2025 18:08 )    Color: x / Appearance: x / SG: x / pH: x  Gluc: 123 mg/dL / Ketone: x  / Bili: x / Urobili: x   Blood: x / Protein: x / Nitrite: x   Leuk Esterase: x / RBC: x / WBC x   Sq Epi: x / Non Sq Epi: x / Bacteria: x        Home Medications:  amLODIPine 5 mg oral tablet: 1 tab(s) orally 2 times a day (19 Feb 2025 06:54)  aspirin 81 mg oral tablet: orally once a day (19 Feb 2025 06:54)  clopidogrel 75 mg oral tablet: 1 tab(s) orally once a day (19 Feb 2025 06:54)  MELOXICAM 15 MG TABLET:  (19 Feb 2025 06:54)  metoprolol succinate 50 mg oral capsule, extended release: 1 cap(s) orally once a day (at bedtime) (19 Feb 2025 06:54)  omeprazole 40 mg oral delayed release capsule: 1 cap(s) orally once a day (19 Feb 2025 06:54)  rosuvastatin 20 mg oral tablet: 1 tab(s) orally once a day (at bedtime) (19 Feb 2025 06:54)  valsartan 160 mg oral tablet: 1 tab(s) orally once a day (19 Feb 2025 06:54)    HOSPITAL MEDICATIONS:  MEDICATIONS  (STANDING):  acetaminophen     Tablet .. 650 milliGRAM(s) Oral every 6 hours  acetaminophen   IVPB .. 1000 milliGRAM(s) IV Intermittent once  albumin human  5% IVPB 500 milliLiter(s) IV Intermittent once  aspirin Suppository 300 milliGRAM(s) Rectal once  ceFAZolin   IVPB 2000 milliGRAM(s) IV Intermittent every 8 hours  chlorhexidine 0.12% Liquid 15 milliLiter(s) Oral Mucosa every 12 hours  chlorhexidine 0.12% Liquid 15 milliLiter(s) Oral Mucosa every 12 hours  chlorhexidine 2% Cloths 1 Application(s) Topical daily  dexMEDEtomidine Infusion 0.5 MICROgram(s)/kG/Hr (15.3 mL/Hr) IV Continuous <Continuous>  dextrose 50% Injectable 50 milliLiter(s) IV Push every 15 minutes  dextrose 50% Injectable 25 milliLiter(s) IV Push every 15 minutes  famotidine Injectable 20 milliGRAM(s) IV Push every 12 hours  insulin regular Infusion 5 Unit(s)/Hr (5 mL/Hr) IV Continuous <Continuous>  meperidine     Injectable 25 milliGRAM(s) IV Push once  niCARdipine Infusion 5 mG/Hr (25 mL/Hr) IV Continuous <Continuous>  nitroglycerin  Infusion 15 MICROgram(s)/Min (4.5 mL/Hr) IV Continuous <Continuous>  norepinephrine Infusion 0.05 MICROgram(s)/kG/Min (11.4 mL/Hr) IV Continuous <Continuous>  propofol Infusion 15 MICROgram(s)/kG/Min (11 mL/Hr) IV Continuous <Continuous>  sodium chloride 0.9%. 1000 milliLiter(s) (20 mL/Hr) IV Continuous <Continuous>  vancomycin  IVPB 1000 milliGRAM(s) IV Intermittent every 12 hours  vasopressin Infusion 0.04 Unit(s)/Min (6 mL/Hr) IV Continuous <Continuous>    MEDICATIONS  (PRN):  HYDROmorphone  Injectable 0.5 milliGRAM(s) IV Push every 6 hours PRN Breakthrough Pain  oxyCODONE    IR 5 milliGRAM(s) Oral every 4 hours PRN Moderate Pain (4 - 6)  oxyCODONE    IR 10 milliGRAM(s) Oral every 4 hours PRN Severe Pain (7 - 10)    RADIOLOGY:  Chest X-ray Reviewed    REVIEW OF SYSTEMS:    [x  ] Unable to assess ROS      PHYSICAL EXAM:          CONSTITUTIONAL: Well-developed; well-nourished; in no acute distress.   	SKIN: warm, dry  	HEAD: Normocephalic; atraumatic.  	EYES: PERRL, EOM, no conj injection, sclera clear  	ENT: No nasal discharge; airway clear.  	NECK: Supple; non tender.   	CARD: S1, S2 normal; no murmurs, gallops, or rubs. Regular rate and rhythm.  no carotid bruits  	RESP: CTA B/L; good air movement No wheezes, rales or rhonchi.  	ABD: Soft, not tender, not distended,  no rebound or guarding, bowel sounds present  	EXT: No clubbing, cyanosis or edema.   warm and well perfused.  pulses palpable  	NEURO: sedated    Assessment   s/p CABG POD 0    Plan:    Neuro:  SAT  Multimodal pain management  Tylenol, Lidoderm patch, gabapentin, opiates as needed  Monitor neuro status in the post op period    CV:  S/P CABG  Monitor perfusion, , lactate, UOP, index and MVO2   Maintain MAP > 65  ASA, statin  Beta blockers when able      Resp:  REMAINS ON FIO2 70 AND PEEP 10 SO NOT STABLE FOR SBT AND EXTUBATION   oxygen to maintain sats > 92%  Continuous pulse oximetry AND ABG monitoring  Nebulizers as needed    GI:  NPO  Bowel Regimen - escalate as needed  PUD ppx per protocol    Renal  High risk for DANAE post surgery  Monitor UOP, lytes, creatinine  Keep K > 4, Mg > 2    Endo:  Tight glucose control per CTICU protocol  Insulin gtt as needed      Heme:  Acute blood loss anemia post surgery  DVT prophylaxis once bleeding risk post surgery is minimized    ID:  Perioperative prophylactic abx per protocol  Monitor fever curve and WBC count        Upon my evaluation, the above patient has a high probability of imminent or life threatening deterioration due to a high risk for Shock, Cardiogenic shock, arrythmias, acute blood loss, acute kidney injury and acute pulmonary insufficiency which required my direct attention, intervention, and personal management.  Total time was 55 minutes which includes review of radiology results, ordering, interpreting and reviewing diagnostic studies / lab tests with immediate assessment and treatment, consultant collaboration on findings and treatment options, monitoring for potential decompensation, obtaining history from family, EMT, nursing home staff and/or treating physicians; directing the titration of pressors, oxygen support devices, fluid resuscitation, interpretation of cardiac output measurements, interpretation of radiological assessments, interpretation of EKG / rhythm strips, telemetry.  This time did not overlap with the management of other patients or performing separately reportable procedures.      Management of this patient was discussed with the CT surgery attending and mid-level providers.    I acknowledge the use of copied documentation.  All copy forward documentation is my own and has been reviewed and revised as appropriate.  If no changes were made, it is because that information remains the same.

## 2025-02-19 NOTE — DISCHARGE NOTE PROVIDER - HOSPITAL COURSE
HISTORY OF PRESENT ILLNESS:   59y Male PMH of HTN, HLD, nephrolithiasis, GERD, Cholecystitis (s/p lap abdulaziz), AAA without prior surgical intervention, obesity, former smoker (smoked for 40 years, quit 3 years ago), chronic back pain. Patient reported SOB when going up 2 flights of stairs or when walking at a fast pace. CCTA revealed CAC of 847 with multivessel disease, followed by CT Fractional flow Reserve (FFR-CT) confirming findings. Presented today for elective LHC which demonstrated Left main with multi-vessel involvement. On 02/19/25, he presented for elective CABG. Post-operatively,     primary cardiologist: Loyda Paredes MD   HISTORY OF PRESENT ILLNESS:   59y Male PMH of HTN, HLD, nephrolithiasis, GERD, Cholecystitis (s/p lap abdulaziz), AAA without prior surgical intervention, obesity, former smoker (smoked for 40 years, quit 3 years ago), chronic back pain. Patient reported SOB when going up 2 flights of stairs or when walking at a fast pace. CCTA revealed CAC of 847 with multivessel disease, followed by CT Fractional flow Reserve (FFR-CT) confirming findings. Presented today for elective LHC which demonstrated Left main with multi-vessel involvement. On 02/19/25, he presented for elective CABG. Post-operatively, his course was prolonged post op pulmonary insufficiency that resolved and paroxsymal atrial fibrillation that was pharmacologically controlled with Amiodarone and Metoprolol Tartate, He was discharged home on a statin due to resolving transaminitis. Statin initiation will be reassessed as an outpatient.  Anticoagulation????  He was discharged home in stable condition on POD # 12    primary cardiologist: Loyda Paredes MD    A discussion was conducted with the patient regarding the importance and benefits of participating in a cardiothoracic rehabilitation program. Contact information given to the patient. Patient instructed to inquire further upon seeing their cardiologist post op.   HISTORY OF PRESENT ILLNESS:   59y Male PMH of HTN, HLD, nephrolithiasis, GERD, Cholecystitis (s/p lap abdulaziz), AAA without prior surgical intervention, obesity, former smoker (smoked for 40 years, quit 3 years ago), chronic back pain. Patient reported SOB when going up 2 flights of stairs or when walking at a fast pace. CCTA revealed CAC of 847 with multivessel disease, followed by CT Fractional flow Reserve (FFR-CT) confirming findings. Presented today for elective LHC which demonstrated Left main with multi-vessel involvement. On 02/19/25, he presented for elective CABG. Post-operatively, his course was prolonged post op pulmonary insufficiency that resolved and paroxsymal atrial fibrillation that was pharmacologically controlled with Amiodarone and Metoprolol Tartate.  He was not discharged home on a statin due to resolving transaminitis. Statin initiation will be reassessed as an outpatient.  He remained in SR for 48 hours prior to discharge so anticoagulation was initiated. He discharged home in stable condition on POD # 12    primary cardiologist: Loyda Paredes MD    A discussion was conducted with the patient regarding the importance and benefits of participating in a cardiothoracic rehabilitation program. Contact information given to the patient. Patient instructed to inquire further upon seeing their cardiologist post op.

## 2025-02-19 NOTE — DISCHARGE NOTE PROVIDER - NSDCMRMEDTOKEN_GEN_ALL_CORE_FT
amLODIPine 5 mg oral tablet: 1 tab(s) orally 2 times a day  aspirin 81 mg oral tablet: orally once a day  clopidogrel 75 mg oral tablet: 1 tab(s) orally once a day  MELOXICAM 15 MG TABLET:   metoprolol succinate 50 mg oral capsule, extended release: 1 cap(s) orally once a day (at bedtime)  omeprazole 40 mg oral delayed release capsule: 1 cap(s) orally once a day  rosuvastatin 20 mg oral tablet: 1 tab(s) orally once a day (at bedtime)  valsartan 160 mg oral tablet: 1 tab(s) orally once a day   Advair Diskus 100 mcg-50 mcg/inh inhalation powder: 1 inhaled 2 times a day  amiodarone 200 mg oral tablet: 1 tab(s) orally 2 times a day  aspirin 81 mg oral tablet: orally once a day  clopidogrel 75 mg oral tablet: 1 tab(s) orally once a day  folic acid 1 mg oral tablet: 1 tab(s) orally once a day  furosemide 20 mg oral tablet: 1 tab(s) orally 2 times a day  MELOXICAM 15 MG TABLET:   metoprolol tartrate 25 mg oral tablet: 1 tab(s) orally every 8 hours  omeprazole 40 mg oral delayed release capsule: 1 cap(s) orally once a day  oxyCODONE 5 mg oral tablet: 1 tab(s) orally every 4 hours as needed for Moderate Pain (4 - 6) MDD: 6  potassium chloride 20 mEq oral tablet, extended release: 1 tab(s) orally once a day

## 2025-02-19 NOTE — DISCHARGE NOTE PROVIDER - CARE PROVIDER_API CALL
Wilfred Martin  Thoracic and Cardiac Surgery  [address] [place] [address], [address]  [place] [address]  Phone: [phone]  Fax: [phone]  Follow Up Time:     Aman Chairez  Interventional Cardiology  [address] [place] [id number]  Phone: [phone]  Fax: [phone]  Follow Up Time:    Wilfred Martin  Thoracic and Cardiac Surgery  [address] [place] [address], [address]  [place] [address]  Phone: [phone]  Fax: [phone]  Scheduled Appointment: 03/06/2025 12:15 PM    Aman Chairez  Interventional Cardiology  [address] [place] [address]  Phone: [phone]  Fax: [phone]  Follow Up Time:

## 2025-02-20 LAB
ALBUMIN SERPL ELPH-MCNC: 3.8 G/DL — SIGNIFICANT CHANGE UP (ref 3.5–5.2)
ALP SERPL-CCNC: 31 U/L — SIGNIFICANT CHANGE UP (ref 30–115)
ALT FLD-CCNC: 34 U/L — SIGNIFICANT CHANGE UP (ref 0–41)
ANION GAP SERPL CALC-SCNC: 9 MMOL/L — SIGNIFICANT CHANGE UP (ref 7–14)
APTT BLD: 36.7 SEC — SIGNIFICANT CHANGE UP (ref 27–39.2)
AST SERPL-CCNC: 76 U/L — HIGH (ref 0–41)
BASE EXCESS BLDMV CALC-SCNC: -0.5 MMOL/L — SIGNIFICANT CHANGE UP
BASOPHILS # BLD AUTO: 0.02 K/UL — SIGNIFICANT CHANGE UP (ref 0–0.2)
BASOPHILS NFR BLD AUTO: 0.4 % — SIGNIFICANT CHANGE UP (ref 0–1)
BILIRUB SERPL-MCNC: 1 MG/DL — SIGNIFICANT CHANGE UP (ref 0.2–1.2)
BUN SERPL-MCNC: 17 MG/DL — SIGNIFICANT CHANGE UP (ref 10–20)
CALCIUM SERPL-MCNC: 7.9 MG/DL — LOW (ref 8.4–10.5)
CHLORIDE SERPL-SCNC: 109 MMOL/L — SIGNIFICANT CHANGE UP (ref 98–110)
CO2 SERPL-SCNC: 25 MMOL/L — SIGNIFICANT CHANGE UP (ref 17–32)
CREAT SERPL-MCNC: 1.2 MG/DL — SIGNIFICANT CHANGE UP (ref 0.7–1.5)
EGFR: 69 ML/MIN/1.73M2 — SIGNIFICANT CHANGE UP
EGFR: 69 ML/MIN/1.73M2 — SIGNIFICANT CHANGE UP
EOSINOPHIL # BLD AUTO: 0.03 K/UL — SIGNIFICANT CHANGE UP (ref 0–0.7)
EOSINOPHIL NFR BLD AUTO: 0.6 % — SIGNIFICANT CHANGE UP (ref 0–8)
GAS PNL BLDA: SIGNIFICANT CHANGE UP
GAS PNL BLDMV: SIGNIFICANT CHANGE UP
GLUCOSE BLDC GLUCOMTR-MCNC: 108 MG/DL — HIGH (ref 70–99)
GLUCOSE BLDC GLUCOMTR-MCNC: 109 MG/DL — HIGH (ref 70–99)
GLUCOSE BLDC GLUCOMTR-MCNC: 110 MG/DL — HIGH (ref 70–99)
GLUCOSE BLDC GLUCOMTR-MCNC: 114 MG/DL — HIGH (ref 70–99)
GLUCOSE BLDC GLUCOMTR-MCNC: 129 MG/DL — HIGH (ref 70–99)
GLUCOSE BLDC GLUCOMTR-MCNC: 135 MG/DL — HIGH (ref 70–99)
GLUCOSE BLDC GLUCOMTR-MCNC: 136 MG/DL — HIGH (ref 70–99)
GLUCOSE BLDC GLUCOMTR-MCNC: 139 MG/DL — HIGH (ref 70–99)
GLUCOSE BLDC GLUCOMTR-MCNC: 145 MG/DL — HIGH (ref 70–99)
GLUCOSE BLDC GLUCOMTR-MCNC: 75 MG/DL — SIGNIFICANT CHANGE UP (ref 70–99)
GLUCOSE SERPL-MCNC: 102 MG/DL — HIGH (ref 70–99)
HCO3 BLDMV-SCNC: 24 MMOL/L — SIGNIFICANT CHANGE UP (ref 20–28)
HCT VFR BLD CALC: 26.4 % — LOW (ref 42–52)
HGB BLD-MCNC: 8.9 G/DL — LOW (ref 14–18)
IMM GRANULOCYTES NFR BLD AUTO: 0.6 % — HIGH (ref 0.1–0.3)
INR BLD: 1.28 RATIO — SIGNIFICANT CHANGE UP (ref 0.65–1.3)
LYMPHOCYTES # BLD AUTO: 0.72 K/UL — LOW (ref 1.2–3.4)
LYMPHOCYTES # BLD AUTO: 13.3 % — LOW (ref 20.5–51.1)
MAGNESIUM SERPL-MCNC: 2 MG/DL — SIGNIFICANT CHANGE UP (ref 1.8–2.4)
MCHC RBC-ENTMCNC: 29.4 PG — SIGNIFICANT CHANGE UP (ref 27–31)
MCHC RBC-ENTMCNC: 33.7 G/DL — SIGNIFICANT CHANGE UP (ref 32–37)
MCV RBC AUTO: 87.1 FL — SIGNIFICANT CHANGE UP (ref 80–94)
MONOCYTES # BLD AUTO: 0.41 K/UL — SIGNIFICANT CHANGE UP (ref 0.1–0.6)
MONOCYTES NFR BLD AUTO: 7.6 % — SIGNIFICANT CHANGE UP (ref 1.7–9.3)
NEUTROPHILS # BLD AUTO: 4.22 K/UL — SIGNIFICANT CHANGE UP (ref 1.4–6.5)
NEUTROPHILS NFR BLD AUTO: 77.5 % — HIGH (ref 42.2–75.2)
NRBC BLD AUTO-RTO: 0 /100 WBCS — SIGNIFICANT CHANGE UP (ref 0–0)
O2 CT VFR BLD CALC: 39 MMHG — SIGNIFICANT CHANGE UP (ref 30–65)
PCO2 BLDMV: 39 MMHG — SIGNIFICANT CHANGE UP (ref 30–65)
PH BLDMV: 7.4 — SIGNIFICANT CHANGE UP
PLATELET # BLD AUTO: 109 K/UL — LOW (ref 130–400)
PMV BLD: 10.3 FL — SIGNIFICANT CHANGE UP (ref 7.4–10.4)
POTASSIUM SERPL-MCNC: 4.1 MMOL/L — SIGNIFICANT CHANGE UP (ref 3.5–5)
POTASSIUM SERPL-SCNC: 4.1 MMOL/L — SIGNIFICANT CHANGE UP (ref 3.5–5)
PROT SERPL-MCNC: 5.2 G/DL — LOW (ref 6–8)
PROTHROM AB SERPL-ACNC: 15.2 SEC — HIGH (ref 9.95–12.87)
RBC # BLD: 3.03 M/UL — LOW (ref 4.7–6.1)
RBC # FLD: 12.7 % — SIGNIFICANT CHANGE UP (ref 11.5–14.5)
SAO2 % BLDMV: 74 — SIGNIFICANT CHANGE UP (ref 60–90)
SODIUM SERPL-SCNC: 143 MMOL/L — SIGNIFICANT CHANGE UP (ref 135–146)
WBC # BLD: 5.43 K/UL — SIGNIFICANT CHANGE UP (ref 4.8–10.8)
WBC # FLD AUTO: 5.43 K/UL — SIGNIFICANT CHANGE UP (ref 4.8–10.8)

## 2025-02-20 PROCEDURE — 99291 CRITICAL CARE FIRST HOUR: CPT

## 2025-02-20 PROCEDURE — 71045 X-RAY EXAM CHEST 1 VIEW: CPT | Mod: 26

## 2025-02-20 PROCEDURE — 71045 X-RAY EXAM CHEST 1 VIEW: CPT | Mod: 26,77

## 2025-02-20 PROCEDURE — 93010 ELECTROCARDIOGRAM REPORT: CPT

## 2025-02-20 PROCEDURE — 99232 SBSQ HOSP IP/OBS MODERATE 35: CPT | Mod: 24

## 2025-02-20 RX ORDER — ACETAMINOPHEN 500 MG/5ML
1000 LIQUID (ML) ORAL ONCE
Refills: 0 | Status: COMPLETED | OUTPATIENT
Start: 2025-02-20 | End: 2025-02-20

## 2025-02-20 RX ORDER — ROSUVASTATIN CALCIUM 20 MG/1
20 TABLET, FILM COATED ORAL AT BEDTIME
Refills: 0 | Status: DISCONTINUED | OUTPATIENT
Start: 2025-02-20 | End: 2025-02-21

## 2025-02-20 RX ORDER — IPRATROPIUM BROMIDE AND ALBUTEROL SULFATE .5; 2.5 MG/3ML; MG/3ML
3 SOLUTION RESPIRATORY (INHALATION) EVERY 6 HOURS
Refills: 0 | Status: DISCONTINUED | OUTPATIENT
Start: 2025-02-20 | End: 2025-02-26

## 2025-02-20 RX ORDER — ACETAMINOPHEN 500 MG/5ML
1000 LIQUID (ML) ORAL ONCE
Refills: 0 | Status: COMPLETED | OUTPATIENT
Start: 2025-02-21 | End: 2025-02-21

## 2025-02-20 RX ORDER — FOLIC ACID 1 MG/1
1 TABLET ORAL DAILY
Refills: 0 | Status: DISCONTINUED | OUTPATIENT
Start: 2025-02-20 | End: 2025-03-03

## 2025-02-20 RX ORDER — LIDOCAINE HYDROCHLORIDE 20 MG/ML
1 JELLY TOPICAL DAILY
Refills: 0 | Status: DISCONTINUED | OUTPATIENT
Start: 2025-02-20 | End: 2025-03-03

## 2025-02-20 RX ORDER — FERROUS SULFATE 137(45) MG
325 TABLET, EXTENDED RELEASE ORAL DAILY
Refills: 0 | Status: DISCONTINUED | OUTPATIENT
Start: 2025-02-20 | End: 2025-02-21

## 2025-02-20 RX ORDER — ASPIRIN 325 MG
81 TABLET ORAL DAILY
Refills: 0 | Status: DISCONTINUED | OUTPATIENT
Start: 2025-02-20 | End: 2025-03-03

## 2025-02-20 RX ORDER — METOPROLOL SUCCINATE 50 MG/1
12.5 TABLET, EXTENDED RELEASE ORAL EVERY 12 HOURS
Refills: 0 | Status: DISCONTINUED | OUTPATIENT
Start: 2025-02-20 | End: 2025-02-21

## 2025-02-20 RX ORDER — HEPARIN SODIUM 1000 [USP'U]/ML
5000 INJECTION INTRAVENOUS; SUBCUTANEOUS EVERY 12 HOURS
Refills: 0 | Status: DISCONTINUED | OUTPATIENT
Start: 2025-02-20 | End: 2025-03-03

## 2025-02-20 RX ADMIN — Medication 1 APPLICATION(S): at 21:12

## 2025-02-20 RX ADMIN — DEXMEDETOMIDINE HYDROCHLORIDE IN SODIUM CHLORIDE 15.3 MICROGRAM(S)/KG/HR: 4 INJECTION INTRAVENOUS at 04:36

## 2025-02-20 RX ADMIN — OXYCODONE HYDROCHLORIDE 10 MILLIGRAM(S): 30 TABLET ORAL at 22:36

## 2025-02-20 RX ADMIN — Medication 20 MILLIGRAM(S): at 17:28

## 2025-02-20 RX ADMIN — Medication 250 MILLIGRAM(S): at 17:27

## 2025-02-20 RX ADMIN — OXYCODONE HYDROCHLORIDE 10 MILLIGRAM(S): 30 TABLET ORAL at 17:28

## 2025-02-20 RX ADMIN — Medication 1 DOSE(S): at 21:12

## 2025-02-20 RX ADMIN — Medication 100 MILLIGRAM(S): at 05:14

## 2025-02-20 RX ADMIN — Medication 1000 MILLIGRAM(S): at 17:45

## 2025-02-20 RX ADMIN — Medication 100 MILLIGRAM(S): at 14:35

## 2025-02-20 RX ADMIN — Medication 0.5 MILLIGRAM(S): at 08:44

## 2025-02-20 RX ADMIN — IPRATROPIUM BROMIDE AND ALBUTEROL SULFATE 3 MILLILITER(S): .5; 2.5 SOLUTION RESPIRATORY (INHALATION) at 19:57

## 2025-02-20 RX ADMIN — Medication 81 MILLIGRAM(S): at 11:42

## 2025-02-20 RX ADMIN — Medication 1000 MILLIGRAM(S): at 07:04

## 2025-02-20 RX ADMIN — Medication 15 MILLILITER(S): at 05:14

## 2025-02-20 RX ADMIN — OXYCODONE HYDROCHLORIDE 10 MILLIGRAM(S): 30 TABLET ORAL at 09:52

## 2025-02-20 RX ADMIN — Medication 1000 MILLIGRAM(S): at 13:37

## 2025-02-20 RX ADMIN — FOLIC ACID 1 MILLIGRAM(S): 1 TABLET ORAL at 11:42

## 2025-02-20 RX ADMIN — Medication 0.5 MILLIGRAM(S): at 13:48

## 2025-02-20 RX ADMIN — Medication 325 MILLIGRAM(S): at 11:42

## 2025-02-20 RX ADMIN — LIDOCAINE HYDROCHLORIDE 1 PATCH: 20 JELLY TOPICAL at 09:22

## 2025-02-20 RX ADMIN — POLYETHYLENE GLYCOL 3350 17 GRAM(S): 17 POWDER, FOR SOLUTION ORAL at 11:41

## 2025-02-20 RX ADMIN — Medication 0.5 MILLIGRAM(S): at 14:18

## 2025-02-20 RX ADMIN — Medication 0.5 MILLIGRAM(S): at 20:23

## 2025-02-20 RX ADMIN — OXYCODONE HYDROCHLORIDE 10 MILLIGRAM(S): 30 TABLET ORAL at 13:37

## 2025-02-20 RX ADMIN — OXYCODONE HYDROCHLORIDE 10 MILLIGRAM(S): 30 TABLET ORAL at 21:36

## 2025-02-20 RX ADMIN — Medication 25 MG/HR: at 21:16

## 2025-02-20 RX ADMIN — Medication 0.5 MILLIGRAM(S): at 21:00

## 2025-02-20 RX ADMIN — Medication 2 TABLET(S): at 21:13

## 2025-02-20 RX ADMIN — Medication 400 MILLIGRAM(S): at 11:40

## 2025-02-20 RX ADMIN — Medication 400 MILLIGRAM(S): at 17:18

## 2025-02-20 RX ADMIN — METOPROLOL SUCCINATE 12.5 MILLIGRAM(S): 50 TABLET, EXTENDED RELEASE ORAL at 13:31

## 2025-02-20 RX ADMIN — LIDOCAINE HYDROCHLORIDE 1 PATCH: 20 JELLY TOPICAL at 21:00

## 2025-02-20 RX ADMIN — OXYCODONE HYDROCHLORIDE 10 MILLIGRAM(S): 30 TABLET ORAL at 18:00

## 2025-02-20 RX ADMIN — IPRATROPIUM BROMIDE AND ALBUTEROL SULFATE 3 MILLILITER(S): .5; 2.5 SOLUTION RESPIRATORY (INHALATION) at 14:59

## 2025-02-20 RX ADMIN — NITROGLYCERIN 4.5 MICROGRAM(S)/MIN: 20 OINTMENT TOPICAL at 22:15

## 2025-02-20 RX ADMIN — OXYCODONE HYDROCHLORIDE 10 MILLIGRAM(S): 30 TABLET ORAL at 13:30

## 2025-02-20 RX ADMIN — Medication 100 MILLIGRAM(S): at 21:13

## 2025-02-20 RX ADMIN — HEPARIN SODIUM 5000 UNIT(S): 1000 INJECTION INTRAVENOUS; SUBCUTANEOUS at 17:28

## 2025-02-20 RX ADMIN — Medication 250 MILLIGRAM(S): at 05:15

## 2025-02-20 RX ADMIN — LIDOCAINE HYDROCHLORIDE 1 PATCH: 20 JELLY TOPICAL at 19:00

## 2025-02-20 RX ADMIN — OXYCODONE HYDROCHLORIDE 10 MILLIGRAM(S): 30 TABLET ORAL at 09:22

## 2025-02-20 RX ADMIN — ROSUVASTATIN CALCIUM 20 MILLIGRAM(S): 20 TABLET, FILM COATED ORAL at 21:13

## 2025-02-20 RX ADMIN — Medication 0.5 MILLIGRAM(S): at 08:14

## 2025-02-20 RX ADMIN — Medication 400 MILLIGRAM(S): at 06:34

## 2025-02-20 RX ADMIN — Medication 20 MILLIGRAM(S): at 05:14

## 2025-02-20 RX ADMIN — DEXMEDETOMIDINE HYDROCHLORIDE IN SODIUM CHLORIDE 15.3 MICROGRAM(S)/KG/HR: 4 INJECTION INTRAVENOUS at 00:15

## 2025-02-20 NOTE — PROGRESS NOTE ADULT - SUBJECTIVE AND OBJECTIVE BOX
OPERATIVE PROCEDURE(s): CABG x4               POD # 1                      SURGEON(s): YUDELKA Martin      SUBJECTIVE ASSESSMENT:59yMale patient seen and examined at bedside.    Vital Signs Last 24 Hrs  T(F): 100.4 (20 Feb 2025 07:00), Max: 100.4 (20 Feb 2025 07:00)  HR: 75 (20 Feb 2025 08:00) (65 - 84)  BP: 101/58 (20 Feb 2025 08:00) (101/58 - 104/55)  BP(mean): 76 (20 Feb 2025 08:00) (75 - 76)  ABP: 109/57 (20 Feb 2025 08:00) (86/56 - 135/73)  ABP(mean): 75 (20 Feb 2025 08:00)  RR: 24 (20 Feb 2025 08:00) (0 - 27)  SpO2: 97% (20 Feb 2025 08:00) (92% - 100%)  CVP(mm Hg): 11 (20 Feb 2025 08:00)  CO: 8.2 (20 Feb 2025 08:00)  CI: 3.4 (20 Feb 2025 08:00)  PA: 35/15 (20 Feb 2025 08:00)  SVR: 623 (20 Feb 2025 08:00)      I&O's Detail    19 Feb 2025 07:01  -  20 Feb 2025 07:00  --------------------------------------------------------  IN:    Albumin 5%  - 500 mL: 500 mL    Dexmedetomidine: 91.4 mL    Dexmedetomidine: 256.5 mL    Insulin: 22 mL    IV PiggyBack: 800 mL    Norepinephrine: 48.2 mL    sodium chloride 0.9%: 500 mL  Total IN: 2218.1 mL    OUT:    Chest Tube (mL): 198 mL    Chest Tube (mL): 330 mL    Indwelling Catheter - Urethral (mL): 945 mL    Nasogastric/Oral tube (mL): 0 mL  Total OUT: 1473 mL        Net: I&O's Detail    19 Feb 2025 07:01  -  20 Feb 2025 07:00  --------------------------------------------------------  Total NET: 745.1 mL        CAPILLARY BLOOD GLUCOSE      POCT Blood Glucose.: 75 mg/dL (20 Feb 2025 05:41)  POCT Blood Glucose.: 114 mg/dL (20 Feb 2025 03:55)  POCT Blood Glucose.: 108 mg/dL (20 Feb 2025 01:01)  POCT Blood Glucose.: 111 mg/dL (19 Feb 2025 22:55)  POCT Blood Glucose.: 129 mg/dL (19 Feb 2025 20:02)  POCT Blood Glucose.: 136 mg/dL (19 Feb 2025 18:53)  POCT Blood Glucose.: 135 mg/dL (19 Feb 2025 17:40)    A1C with Estimated Average Glucose Result: 6.0 % (02-11-25 @ 13:01)      Physical Exam:  General: NAD; A&Ox3  Neuro: pupils equal and reactive, speech clear, no overt sensory or motor deficts  Cardiac: S1/S2, RRR, no murmur, no rubs  Lungs: unlabored respirations, CTA b/l, no wheeze, no rales, no crackles  Abdomen: Soft/NT/ND; positive bowel sounds x 4  Sternum: Intact, no click, incision healing well with no drainage  Incisions: Incisions clean/dry/intact  Extremities: No edema b/l lower extremities; good capillary refill; no cyanosis; palpable 1+ pedal pulses b/l    Central Venous Catheter: Yes: critical illness, intravenous access  Day # 1  Mckeon Catheter: Yes: critical illness; monitor strict i/o's                    Day # 1  EPICARDIAL WIRES:  YES                                                                         Day # 1  BOWEL MOVEMENT:  [] YES [] NO, If No, Timing since last BM Day #  CHEST TUBE(MS/Left/Right):  [] YES [] NO, If yes -  AIR LEAKS:  YES/NO        LABS:                        8.9[L]  5.43  )-----------( 109[L]    ( 20 Feb 2025 02:30 )             26.4[L]                        9.8[L]  10.89[H] )-----------( 132      ( 19 Feb 2025 18:08 )             28.8[L]    02-20    143  |  109  |  17  ----------------------------<  102[H]  4.1   |  25  |  1.2  02-19    143  |  106  |  17  ----------------------------<  123[H]  4.2   |  23  |  1.2    Ca    7.9[L]      20 Feb 2025 02:30  Mg     2.0     02-20    TPro  5.2[L] [6.0 - 8.0]  /  Alb  3.8 [3.5 - 5.2]  /  TBili  1.0 [0.2 - 1.2]  /  DBili  x   /  AST  76[H] [0 - 41]  /  ALT  34 [0 - 41]  /  AlkPhos  31 [30 - 115]  02-20    PT/INR - ( 20 Feb 2025 02:30 )   PT: ;   INR: 1.28 ratio         PT/INR - ( 19 Feb 2025 18:08 )   PT: ;   INR: 1.26 ratio         PTT - ( 20 Feb 2025 02:30 )  PTT:36.7 sec, PTT - ( 19 Feb 2025 18:08 )  PTT:49.3 sec      ABG - ( 20 Feb 2025 06:19 )  pH: 7.43  /  pCO2: 35    /  pO2: 75    / HCO3: 23    / Base Excess: -0.6  /  SaO2: 97.4  /  LA: 1.2              RADIOLOGY & ADDITIONAL TESTS:  CXR:   EKG:    Allergies    No Known Allergies    Intolerances      MEDICATIONS  (STANDING):  aspirin enteric coated 81 milliGRAM(s) Oral daily  ceFAZolin   IVPB 2000 milliGRAM(s) IV Intermittent every 8 hours  chlorhexidine 2% Cloths 1 Application(s) Topical daily  dextrose 50% Injectable 50 milliLiter(s) IV Push every 15 minutes  dextrose 50% Injectable 25 milliLiter(s) IV Push every 15 minutes  famotidine    Tablet 20 milliGRAM(s) Oral every 12 hours  heparin   Injectable 5000 Unit(s) SubCutaneous every 12 hours  insulin regular Infusion 5 Unit(s)/Hr (5 mL/Hr) IV Continuous <Continuous>  niCARdipine Infusion 5 mG/Hr (25 mL/Hr) IV Continuous <Continuous>  nitroglycerin  Infusion 15 MICROgram(s)/Min (4.5 mL/Hr) IV Continuous <Continuous>  norepinephrine Infusion 0.05 MICROgram(s)/kG/Min (11.4 mL/Hr) IV Continuous <Continuous>  polyethylene glycol 3350 17 Gram(s) Oral daily  rosuvastatin 20 milliGRAM(s) Oral at bedtime  senna 2 Tablet(s) Oral at bedtime  sodium chloride 0.9%. 1000 milliLiter(s) (20 mL/Hr) IV Continuous <Continuous>  vancomycin  IVPB 1000 milliGRAM(s) IV Intermittent every 12 hours  vasopressin Infusion 0.04 Unit(s)/Min (6 mL/Hr) IV Continuous <Continuous>    MEDICATIONS  (PRN):  HYDROmorphone  Injectable 0.5 milliGRAM(s) IV Push every 6 hours PRN Breakthrough Pain  oxyCODONE    IR 5 milliGRAM(s) Oral every 4 hours PRN Moderate Pain (4 - 6)  oxyCODONE    IR 10 milliGRAM(s) Oral every 4 hours PRN Severe Pain (7 - 10)    Home Medications:  amLODIPine 5 mg oral tablet: 1 tab(s) orally 2 times a day (19 Feb 2025 06:54)  aspirin 81 mg oral tablet: orally once a day (19 Feb 2025 06:54)  clopidogrel 75 mg oral tablet: 1 tab(s) orally once a day (19 Feb 2025 06:54)  MELOXICAM 15 MG TABLET:  (19 Feb 2025 06:54)  metoprolol succinate 50 mg oral capsule, extended release: 1 cap(s) orally once a day (at bedtime) (19 Feb 2025 06:54)  omeprazole 40 mg oral delayed release capsule: 1 cap(s) orally once a day (19 Feb 2025 06:54)  rosuvastatin 20 mg oral tablet: 1 tab(s) orally once a day (at bedtime) (19 Feb 2025 06:54)  valsartan 160 mg oral tablet: 1 tab(s) orally once a day (19 Feb 2025 06:54)      Pharmacologic DVT Prophylaxis [X] YES, [] NO: Contraindication:  SCD's: YES b/l    GI Prophylaxis: pepcid    Post-Op Beta-Blockers: [] Yes, [] No: contraindication:  Post-Op CCB: [] Yes, [] No: contraindication:  Post-Op Aspirin:  [] Yes, [] No: contraindication:  Post-Op Statin:  [] Yes, [] No: contraindication:    Ambulation/Activity Status:    Assessment/Plan:  59y Male status-post  - Case and plan discussed with CTU Intensivist and CT Surgeon - Dr. Martin/Sole/Monik/Jose  - Continue CTU supportive care and ongoing plan of care as per continuing CTU rounds.   - Continue DVT/GI prophylaxis  - Incentive Spirometry 10 times an hour  - Continue to advance physical activity as tolerated and continue PT/OT as directed  1. CAD s/p CABG: Continue ASA, statin, BB  2. HTN:   3. Post-op A. Fib ppx:   4. COPD/Hypoxia:   5. DM/Glucose Control:     Social Service Disposition:     OPERATIVE PROCEDURE(s): CABG x4               POD # 1                      SURGEON(s): YUDELKA Martin      SUBJECTIVE ASSESSMENT:59yMale patient seen and examined at bedside. Pt currently denies acute complaints, states that his pain is well controlled with pain medications.    Vital Signs Last 24 Hrs  T(F): 100.4 (20 Feb 2025 07:00), Max: 100.4 (20 Feb 2025 07:00)  HR: 75 (20 Feb 2025 08:00) (65 - 84)  BP: 101/58 (20 Feb 2025 08:00) (101/58 - 104/55)  BP(mean): 76 (20 Feb 2025 08:00) (75 - 76)  ABP: 109/57 (20 Feb 2025 08:00) (86/56 - 135/73)  ABP(mean): 75 (20 Feb 2025 08:00)  RR: 24 (20 Feb 2025 08:00) (0 - 27)  SpO2: 97% (20 Feb 2025 08:00) (92% - 100%)  CVP(mm Hg): 11 (20 Feb 2025 08:00)  CO: 8.2 (20 Feb 2025 08:00)  CI: 3.4 (20 Feb 2025 08:00)  PA: 35/15 (20 Feb 2025 08:00)  SVR: 623 (20 Feb 2025 08:00)      I&O's Detail    19 Feb 2025 07:01  -  20 Feb 2025 07:00  --------------------------------------------------------  IN:    Albumin 5%  - 500 mL: 500 mL    Dexmedetomidine: 91.4 mL    Dexmedetomidine: 256.5 mL    Insulin: 22 mL    IV PiggyBack: 800 mL    Norepinephrine: 48.2 mL    sodium chloride 0.9%: 500 mL  Total IN: 2218.1 mL    OUT:    Chest Tube (mL): 198 mL    Chest Tube (mL): 330 mL    Indwelling Catheter - Urethral (mL): 945 mL    Nasogastric/Oral tube (mL): 0 mL  Total OUT: 1473 mL        Net: I&O's Detail    19 Feb 2025 07:01  -  20 Feb 2025 07:00  --------------------------------------------------------  Total NET: 745.1 mL        CAPILLARY BLOOD GLUCOSE      POCT Blood Glucose.: 75 mg/dL (20 Feb 2025 05:41)  POCT Blood Glucose.: 114 mg/dL (20 Feb 2025 03:55)  POCT Blood Glucose.: 108 mg/dL (20 Feb 2025 01:01)  POCT Blood Glucose.: 111 mg/dL (19 Feb 2025 22:55)  POCT Blood Glucose.: 129 mg/dL (19 Feb 2025 20:02)  POCT Blood Glucose.: 136 mg/dL (19 Feb 2025 18:53)  POCT Blood Glucose.: 135 mg/dL (19 Feb 2025 17:40)    A1C with Estimated Average Glucose Result: 6.0 % (02-11-25 @ 13:01)      Physical Exam:  General: NAD; A&Ox3  Cardiac: S1/S2, RRR, no rubs  Lungs: unlabored respirations, CTA b/l, no wheeze, no rales, no crackles  Abdomen: Soft/NT/ND  Sternum: Intact, no click, dressing with minimal dried blood, intact. No erythema, swelling, active discharge.  R leg wrapped in ace bandage  Extremities: Mild nonpitting edema B/L UE and LE; no cyanosis. Strength 5/5 of distal LE B/L.    Central Venous Catheter: Yes: critical illness, intravenous access  Day # 1  Mckeon Catheter: Yes: critical illness; monitor strict i/o's                    Day # 1  EPICARDIAL WIRES:  YES                                                                         Day # 1  BOWEL MOVEMENT:  [] YES [X] NO, If No, Timing since last BM Day #1  CHEST TUBE(MS and left):  [X] YES [] NO, If yes -  AIR LEAKS:  YES/NO        LABS:                        8.9[L]  5.43  )-----------( 109[L]    ( 20 Feb 2025 02:30 )             26.4[L]                        9.8[L]  10.89[H] )-----------( 132      ( 19 Feb 2025 18:08 )             28.8[L]    02-20    143  |  109  |  17  ----------------------------<  102[H]  4.1   |  25  |  1.2  02-19    143  |  106  |  17  ----------------------------<  123[H]  4.2   |  23  |  1.2    Ca    7.9[L]      20 Feb 2025 02:30  Mg     2.0     02-20    TPro  5.2[L] [6.0 - 8.0]  /  Alb  3.8 [3.5 - 5.2]  /  TBili  1.0 [0.2 - 1.2]  /  DBili  x   /  AST  76[H] [0 - 41]  /  ALT  34 [0 - 41]  /  AlkPhos  31 [30 - 115]  02-20    PT/INR - ( 20 Feb 2025 02:30 )   PT: ;   INR: 1.28 ratio         PT/INR - ( 19 Feb 2025 18:08 )   PT: ;   INR: 1.26 ratio         PTT - ( 20 Feb 2025 02:30 )  PTT:36.7 sec, PTT - ( 19 Feb 2025 18:08 )  PTT:49.3 sec      ABG - ( 20 Feb 2025 06:19 )  pH: 7.43  /  pCO2: 35    /  pO2: 75    / HCO3: 23    / Base Excess: -0.6  /  SaO2: 97.4  /  LA: 1.2              RADIOLOGY & ADDITIONAL TESTS:  CXR:   < from: Xray Chest 1 View- PORTABLE-Urgent (Xray Chest 1 View- PORTABLE-Urgent .) (02.20.25 @ 06:48) >  Findings/  impression:    Support devices: [place] tip slightly retracted likely in right main   pulmonary artery. Additional support devices appear stable.    Cardiac/mediastinum/hilum: Stable.    Lung parenchyma/Pleura: No pneumothorax. Stable bibasilaropacities.    Skeleton/soft tissues: Stable.    < end of copied text >      Allergies    No Known Allergies    Intolerances      MEDICATIONS  (STANDING):  aspirin enteric coated 81 milliGRAM(s) Oral daily  ceFAZolin   IVPB 2000 milliGRAM(s) IV Intermittent every 8 hours  chlorhexidine 2% Cloths 1 Application(s) Topical daily  dextrose 50% Injectable 50 milliLiter(s) IV Push every 15 minutes  dextrose 50% Injectable 25 milliLiter(s) IV Push every 15 minutes  famotidine    Tablet 20 milliGRAM(s) Oral every 12 hours  heparin   Injectable 5000 Unit(s) SubCutaneous every 12 hours  insulin regular Infusion 5 Unit(s)/Hr (5 mL/Hr) IV Continuous <Continuous>  niCARdipine Infusion 5 mG/Hr (25 mL/Hr) IV Continuous <Continuous>  nitroglycerin  Infusion 15 MICROgram(s)/Min (4.5 mL/Hr) IV Continuous <Continuous>  norepinephrine Infusion 0.05 MICROgram(s)/kG/Min (11.4 mL/Hr) IV Continuous <Continuous>  polyethylene glycol 3350 17 Gram(s) Oral daily  rosuvastatin 20 milliGRAM(s) Oral at bedtime  senna 2 Tablet(s) Oral at bedtime  sodium chloride 0.9%. 1000 milliLiter(s) (20 mL/Hr) IV Continuous <Continuous>  vancomycin  IVPB 1000 milliGRAM(s) IV Intermittent every 12 hours  vasopressin Infusion 0.04 Unit(s)/Min (6 mL/Hr) IV Continuous <Continuous>    MEDICATIONS  (PRN):  HYDROmorphone  Injectable 0.5 milliGRAM(s) IV Push every 6 hours PRN Breakthrough Pain  oxyCODONE    IR 5 milliGRAM(s) Oral every 4 hours PRN Moderate Pain (4 - 6)  oxyCODONE    IR 10 milliGRAM(s) Oral every 4 hours PRN Severe Pain (7 - 10)    Home Medications:  amLODIPine 5 mg oral tablet: 1 tab(s) orally 2 times a day (19 Feb 2025 06:54)  aspirin 81 mg oral tablet: orally once a day (19 Feb 2025 06:54)  clopidogrel 75 mg oral tablet: 1 tab(s) orally once a day (19 Feb 2025 06:54)  MELOXICAM 15 MG TABLET:  (19 Feb 2025 06:54)  metoprolol succinate 50 mg oral capsule, extended release: 1 cap(s) orally once a day (at bedtime) (19 Feb 2025 06:54)  omeprazole 40 mg oral delayed release capsule: 1 cap(s) orally once a day (19 Feb 2025 06:54)  rosuvastatin 20 mg oral tablet: 1 tab(s) orally once a day (at bedtime) (19 Feb 2025 06:54)  valsartan 160 mg oral tablet: 1 tab(s) orally once a day (19 Feb 2025 06:54)      Pharmacologic DVT Prophylaxis [X] YES, [] NO: Contraindication:  Heparin 5000 units q12hr  SCD's: YES b/l    GI Prophylaxis: pepcid    Post-Op Beta-Blockers: [] Yes, [] No: contraindication:  Post-Op Aspirin:  [X] Yes, [] No: contraindication:  Post-Op Statin:  [X] Yes, [] No: contraindication:    Ambulation/Activity Status: OOB to chair    Assessment/Plan:  59y Male POD#1 s/p CABGx4  - Case and plan discussed with CTU Intensivist and CT Surgeon - Dr. Martin  - Continue CTU supportive care and ongoing plan of care as per continuing CTU rounds.   - Continue DVT/GI prophylaxis  - Incentive Spirometry 10 times an hour  - Continue to advance physical activity as tolerated and continue PT/OT as directed  1. CAD s/p CABG: Continue ASA, statin, BB. Continue ppx antibiotics: ancef and vanc. Pain control with lidocaine patch, IV tylenol, oxy, dilaudid prn. D/c fem line and swan cath today.  2. HTN: keep SBP   3. Post-op A. Fib ppx: monitor electrolytes, keep epicardial wires.  4. COPD/Hypoxia: Continue supplemental O2, duoneb q6hr, advair. Keep O2sat >92%. F/u post extubation ABG.  5. DM/Glucose Control: insulin gtt prn  6. Anemia, 2/2 intraop blood loss: Monitor H/H. Started ferrous sulfate and folic acid.     Social Service Disposition:     OPERATIVE PROCEDURE(s): CABG x4               POD # 1                      SURGEON(s): YUDELKA Martin      SUBJECTIVE ASSESSMENT:59yMale patient seen and examined at bedside. Pt currently denies acute complaints, states that his pain is well controlled with pain medications.    Vital Signs Last 24 Hrs  T(F): 100.4 (20 Feb 2025 07:00), Max: 100.4 (20 Feb 2025 07:00)  HR: 75 (20 Feb 2025 08:00) (65 - 84)  BP: 101/58 (20 Feb 2025 08:00) (101/58 - 104/55)  BP(mean): 76 (20 Feb 2025 08:00) (75 - 76)  ABP: 109/57 (20 Feb 2025 08:00) (86/56 - 135/73)  ABP(mean): 75 (20 Feb 2025 08:00)  RR: 24 (20 Feb 2025 08:00) (0 - 27)  SpO2: 97% (20 Feb 2025 08:00) (92% - 100%)  CVP(mm Hg): 11 (20 Feb 2025 08:00)  CO: 8.2 (20 Feb 2025 08:00)  CI: 3.4 (20 Feb 2025 08:00)  PA: 35/15 (20 Feb 2025 08:00)  SVR: 623 (20 Feb 2025 08:00)      I&O's Detail    19 Feb 2025 07:01  -  20 Feb 2025 07:00  --------------------------------------------------------  IN:    Albumin 5%  - 500 mL: 500 mL    Dexmedetomidine: 91.4 mL    Dexmedetomidine: 256.5 mL    Insulin: 22 mL    IV PiggyBack: 800 mL    Norepinephrine: 48.2 mL    sodium chloride 0.9%: 500 mL  Total IN: 2218.1 mL    OUT:    Chest Tube (mL): 198 mL    Chest Tube (mL): 330 mL    Indwelling Catheter - Urethral (mL): 945 mL    Nasogastric/Oral tube (mL): 0 mL  Total OUT: 1473 mL        Net: I&O's Detail    19 Feb 2025 07:01  -  20 Feb 2025 07:00  --------------------------------------------------------  Total NET: 745.1 mL        CAPILLARY BLOOD GLUCOSE      POCT Blood Glucose.: 75 mg/dL (20 Feb 2025 05:41)  POCT Blood Glucose.: 114 mg/dL (20 Feb 2025 03:55)  POCT Blood Glucose.: 108 mg/dL (20 Feb 2025 01:01)  POCT Blood Glucose.: 111 mg/dL (19 Feb 2025 22:55)  POCT Blood Glucose.: 129 mg/dL (19 Feb 2025 20:02)  POCT Blood Glucose.: 136 mg/dL (19 Feb 2025 18:53)  POCT Blood Glucose.: 135 mg/dL (19 Feb 2025 17:40)    A1C with Estimated Average Glucose Result: 6.0 % (02-11-25 @ 13:01)      Physical Exam:  General: NAD; A&Ox3  Cardiac: S1/S2, RRR, no rubs  Lungs: unlabored respirations, CTA b/l, no wheeze, no rales, no crackles  Abdomen: Soft/NT/ND  Sternum: Intact, no click, dressing with minimal dried blood, intact. No erythema, swelling, active discharge.  R leg wrapped in ace bandage  Extremities: Mild nonpitting edema B/L UE and LE; no cyanosis. Strength 5/5 of distal LE B/L.    Central Venous Catheter: Yes: critical illness, intravenous access  Day # 1  Mckeon Catheter: Yes: critical illness; monitor strict i/o's                    Day # 1  EPICARDIAL WIRES:  YES                                                                         Day # 1  BOWEL MOVEMENT:  [] YES [X] NO, If No, Timing since last BM Day #1  CHEST TUBE(MS and left):  [X] YES [] NO, If yes -  AIR LEAKS:  YES/NO        LABS:                        8.9[L]  5.43  )-----------( 109[L]    ( 20 Feb 2025 02:30 )             26.4[L]                        9.8[L]  10.89[H] )-----------( 132      ( 19 Feb 2025 18:08 )             28.8[L]    02-20    143  |  109  |  17  ----------------------------<  102[H]  4.1   |  25  |  1.2  02-19    143  |  106  |  17  ----------------------------<  123[H]  4.2   |  23  |  1.2    Ca    7.9[L]      20 Feb 2025 02:30  Mg     2.0     02-20    TPro  5.2[L] [6.0 - 8.0]  /  Alb  3.8 [3.5 - 5.2]  /  TBili  1.0 [0.2 - 1.2]  /  DBili  x   /  AST  76[H] [0 - 41]  /  ALT  34 [0 - 41]  /  AlkPhos  31 [30 - 115]  02-20    PT/INR - ( 20 Feb 2025 02:30 )   PT: ;   INR: 1.28 ratio         PT/INR - ( 19 Feb 2025 18:08 )   PT: ;   INR: 1.26 ratio         PTT - ( 20 Feb 2025 02:30 )  PTT:36.7 sec, PTT - ( 19 Feb 2025 18:08 )  PTT:49.3 sec      ABG - ( 20 Feb 2025 06:19 )  pH: 7.43  /  pCO2: 35    /  pO2: 75    / HCO3: 23    / Base Excess: -0.6  /  SaO2: 97.4  /  LA: 1.2              RADIOLOGY & ADDITIONAL TESTS:  CXR:   < from: Xray Chest 1 View- PORTABLE-Urgent (Xray Chest 1 View- PORTABLE-Urgent .) (02.20.25 @ 06:48) >  Findings/  impression:    Support devices: [place] tip slightly retracted likely in right main   pulmonary artery. Additional support devices appear stable.    Cardiac/mediastinum/hilum: Stable.    Lung parenchyma/Pleura: No pneumothorax. Stable bibasilaropacities.    Skeleton/soft tissues: Stable.    < end of copied text >      Allergies    No Known Allergies    Intolerances      MEDICATIONS  (STANDING):  aspirin enteric coated 81 milliGRAM(s) Oral daily  ceFAZolin   IVPB 2000 milliGRAM(s) IV Intermittent every 8 hours  chlorhexidine 2% Cloths 1 Application(s) Topical daily  dextrose 50% Injectable 50 milliLiter(s) IV Push every 15 minutes  dextrose 50% Injectable 25 milliLiter(s) IV Push every 15 minutes  famotidine    Tablet 20 milliGRAM(s) Oral every 12 hours  heparin   Injectable 5000 Unit(s) SubCutaneous every 12 hours  insulin regular Infusion 5 Unit(s)/Hr (5 mL/Hr) IV Continuous <Continuous>  niCARdipine Infusion 5 mG/Hr (25 mL/Hr) IV Continuous <Continuous>  nitroglycerin  Infusion 15 MICROgram(s)/Min (4.5 mL/Hr) IV Continuous <Continuous>  norepinephrine Infusion 0.05 MICROgram(s)/kG/Min (11.4 mL/Hr) IV Continuous <Continuous>  polyethylene glycol 3350 17 Gram(s) Oral daily  rosuvastatin 20 milliGRAM(s) Oral at bedtime  senna 2 Tablet(s) Oral at bedtime  sodium chloride 0.9%. 1000 milliLiter(s) (20 mL/Hr) IV Continuous <Continuous>  vancomycin  IVPB 1000 milliGRAM(s) IV Intermittent every 12 hours  vasopressin Infusion 0.04 Unit(s)/Min (6 mL/Hr) IV Continuous <Continuous>    MEDICATIONS  (PRN):  HYDROmorphone  Injectable 0.5 milliGRAM(s) IV Push every 6 hours PRN Breakthrough Pain  oxyCODONE    IR 5 milliGRAM(s) Oral every 4 hours PRN Moderate Pain (4 - 6)  oxyCODONE    IR 10 milliGRAM(s) Oral every 4 hours PRN Severe Pain (7 - 10)    Home Medications:  amLODIPine 5 mg oral tablet: 1 tab(s) orally 2 times a day (19 Feb 2025 06:54)  aspirin 81 mg oral tablet: orally once a day (19 Feb 2025 06:54)  clopidogrel 75 mg oral tablet: 1 tab(s) orally once a day (19 Feb 2025 06:54)  MELOXICAM 15 MG TABLET:  (19 Feb 2025 06:54)  metoprolol succinate 50 mg oral capsule, extended release: 1 cap(s) orally once a day (at bedtime) (19 Feb 2025 06:54)  omeprazole 40 mg oral delayed release capsule: 1 cap(s) orally once a day (19 Feb 2025 06:54)  rosuvastatin 20 mg oral tablet: 1 tab(s) orally once a day (at bedtime) (19 Feb 2025 06:54)  valsartan 160 mg oral tablet: 1 tab(s) orally once a day (19 Feb 2025 06:54)      Pharmacologic DVT Prophylaxis [X] YES, [] NO: Contraindication:  Heparin 5000 units q12hr  SCD's: YES b/l    GI Prophylaxis: pepcid    Post-Op Beta-Blockers: [] Yes, [X] No: contraindication: HoTN  Post-Op Aspirin:  [X] Yes, [] No: contraindication:  Post-Op Statin:  [X] Yes, [] No: contraindication:    Ambulation/Activity Status: OOB to chair    Assessment/Plan:  59y Male POD#1 s/p CABGx4  - Case and plan discussed with CTU Intensivist and CT Surgeon - Dr. Martin  - Continue CTU supportive care and ongoing plan of care as per continuing CTU rounds.   - Continue DVT/GI prophylaxis  - Incentive Spirometry 10 times an hour  - Continue to advance physical activity as tolerated and continue PT/OT as directed  1. CAD s/p CABG: Continue ASA, statin, BB. Continue ppx antibiotics: ancef and vanc. Pain control with lidocaine patch, IV tylenol, oxy, dilaudid prn. D/c fem line and swan cath today.  2. Anemia, 2/2 acute blood loss: Monitor H/H. Started ferrous sulfate and folic acid.   3. HTN: With hypotension overnight requiring levophed, goal MAP >65, no BB for now.  4. Post-op A. Fib ppx: monitor electrolytes, keep epicardial wires.  5. COPD/Hypoxia: Continue high flow O2, duoneb q6hr, advair. Keep O2sat >92%. Encourage incentive spirometry. F/u post extubation ABG.  6. DM/Glucose Control: insulin gtt prn      Social Service Disposition:     OPERATIVE PROCEDURE(s): CABG x4               POD # 1                      SURGEON(s): YUDELKA Martin      SUBJECTIVE ASSESSMENT:59yMale patient seen and examined at bedside. Pt currently denies acute complaints, states that his pain is well controlled with pain medications.    Vital Signs Last 24 Hrs  T(F): 100.4 (20 Feb 2025 07:00), Max: 100.4 (20 Feb 2025 07:00)  HR: 75 (20 Feb 2025 08:00) (65 - 84)  BP: 101/58 (20 Feb 2025 08:00) (101/58 - 104/55)  BP(mean): 76 (20 Feb 2025 08:00) (75 - 76)  ABP: 109/57 (20 Feb 2025 08:00) (86/56 - 135/73)  ABP(mean): 75 (20 Feb 2025 08:00)  RR: 24 (20 Feb 2025 08:00) (0 - 27)  SpO2: 97% (20 Feb 2025 08:00) (92% - 100%)  CVP(mm Hg): 11 (20 Feb 2025 08:00)  CO: 8.2 (20 Feb 2025 08:00)  CI: 3.4 (20 Feb 2025 08:00)  PA: 35/15 (20 Feb 2025 08:00)  SVR: 623 (20 Feb 2025 08:00)      I&O's Detail    19 Feb 2025 07:01  -  20 Feb 2025 07:00  --------------------------------------------------------  IN:    Albumin 5%  - 500 mL: 500 mL    Dexmedetomidine: 91.4 mL    Dexmedetomidine: 256.5 mL    Insulin: 22 mL    IV PiggyBack: 800 mL    Norepinephrine: 48.2 mL    sodium chloride 0.9%: 500 mL  Total IN: 2218.1 mL    OUT:    Chest Tube (mL): 198 mL    Chest Tube (mL): 330 mL    Indwelling Catheter - Urethral (mL): 945 mL    Nasogastric/Oral tube (mL): 0 mL  Total OUT: 1473 mL        Net: I&O's Detail    19 Feb 2025 07:01  -  20 Feb 2025 07:00  --------------------------------------------------------  Total NET: 745.1 mL        CAPILLARY BLOOD GLUCOSE      POCT Blood Glucose.: 75 mg/dL (20 Feb 2025 05:41)  POCT Blood Glucose.: 114 mg/dL (20 Feb 2025 03:55)  POCT Blood Glucose.: 108 mg/dL (20 Feb 2025 01:01)  POCT Blood Glucose.: 111 mg/dL (19 Feb 2025 22:55)  POCT Blood Glucose.: 129 mg/dL (19 Feb 2025 20:02)  POCT Blood Glucose.: 136 mg/dL (19 Feb 2025 18:53)  POCT Blood Glucose.: 135 mg/dL (19 Feb 2025 17:40)    A1C with Estimated Average Glucose Result: 6.0 % (02-11-25 @ 13:01)      Physical Exam:  General: NAD; A&Ox3  Cardiac: S1/S2, RRR, no rubs  Lungs: unlabored respirations, CTA b/l, no wheeze, no rales, no crackles  Abdomen: Soft/NT/ND  Sternum: Intact, no click, dressing with minimal dried blood, intact. No erythema, swelling, active discharge.  R leg wrapped in ace bandage  Extremities: Mild nonpitting edema B/L UE and LE; no cyanosis. Strength 5/5 of distal LE B/L.    Central Venous Catheter: Yes: critical illness, intravenous access  Day # 1  [place] Catheter: Yes: critical illness; monitor strict i/o's                    Day # 1  EPICARDIAL WIRES:  YES                                                                         Day # 1  BOWEL MOVEMENT:  [] YES [X] NO, If No, Timing since last BM Day #1  CHEST TUBE(MS and left):  [X] YES [] NO, If yes -  AIR LEAKS:  YES/NO        LABS:                        8.9[L]  5.43  )-----------( 109[L]    ( 20 Feb 2025 02:30 )             26.4[L]                        9.8[L]  10.89[H] )-----------( 132      ( 19 Feb 2025 18:08 )             28.8[L]    02-20    143  |  109  |  17  ----------------------------<  102[H]  4.1   |  25  |  1.2  02-19    143  |  106  |  17  ----------------------------<  123[H]  4.2   |  23  |  1.2    Ca    7.9[L]      20 Feb 2025 02:30  Mg     2.0     02-20    TPro  5.2[L] [6.0 - 8.0]  /  Alb  3.8 [3.5 - 5.2]  /  TBili  1.0 [0.2 - 1.2]  /  DBili  x   /  AST  76[H] [0 - 41]  /  ALT  34 [0 - 41]  /  AlkPhos  31 [30 - 115]  02-20    PT/INR - ( 20 Feb 2025 02:30 )   PT: ;   INR: 1.28 ratio         PT/INR - ( 19 Feb 2025 18:08 )   PT: ;   INR: 1.26 ratio         PTT - ( 20 Feb 2025 02:30 )  PTT:36.7 sec, PTT - ( 19 Feb 2025 18:08 )  PTT:49.3 sec      ABG - ( 20 Feb 2025 06:19 )  pH: 7.43  /  pCO2: 35    /  pO2: 75    / HCO3: 23    / Base Excess: -0.6  /  SaO2: 97.4  /  LA: 1.2              RADIOLOGY & ADDITIONAL TESTS:  CXR:   < from: Xray Chest 1 View- PORTABLE-Urgent (Xray Chest 1 View- PORTABLE-Urgent .) (02.20.25 @ 06:48) >  Findings/  impression:    Support devices: [place] tip slightly retracted likely in right main   pulmonary artery. Additional support devices appear stable.    Cardiac/mediastinum/hilum: Stable.    Lung parenchyma/Pleura: No pneumothorax. Stable bibasilaropacities.    Skeleton/soft tissues: Stable.    < end of copied text >      Allergies    No Known Allergies    Intolerances      MEDICATIONS  (STANDING):  aspirin enteric coated 81 milliGRAM(s) Oral daily  ceFAZolin   IVPB 2000 milliGRAM(s) IV Intermittent every 8 hours  chlorhexidine 2% Cloths 1 Application(s) Topical daily  dextrose 50% Injectable 50 milliLiter(s) IV Push every 15 minutes  dextrose 50% Injectable 25 milliLiter(s) IV Push every 15 minutes  famotidine    Tablet 20 milliGRAM(s) Oral every 12 hours  heparin   Injectable 5000 Unit(s) SubCutaneous every 12 hours  insulin regular Infusion 5 Unit(s)/Hr (5 mL/Hr) IV Continuous <Continuous>  niCARdipine Infusion 5 mG/Hr (25 mL/Hr) IV Continuous <Continuous>  nitroglycerin  Infusion 15 MICROgram(s)/Min (4.5 mL/Hr) IV Continuous <Continuous>  norepinephrine Infusion 0.05 MICROgram(s)/kG/Min (11.4 mL/Hr) IV Continuous <Continuous>  polyethylene glycol 3350 17 Gram(s) Oral daily  rosuvastatin 20 milliGRAM(s) Oral at bedtime  senna 2 Tablet(s) Oral at bedtime  sodium chloride 0.9%. 1000 milliLiter(s) (20 mL/Hr) IV Continuous <Continuous>  vancomycin  IVPB 1000 milliGRAM(s) IV Intermittent every 12 hours  vasopressin Infusion 0.04 Unit(s)/Min (6 mL/Hr) IV Continuous <Continuous>    MEDICATIONS  (PRN):  HYDROmorphone  Injectable 0.5 milliGRAM(s) IV Push every 6 hours PRN Breakthrough Pain  oxyCODONE    IR 5 milliGRAM(s) Oral every 4 hours PRN Moderate Pain (4 - 6)  oxyCODONE    IR 10 milliGRAM(s) Oral every 4 hours PRN Severe Pain (7 - 10)    Home Medications:  amLODIPine 5 mg oral tablet: 1 tab(s) orally 2 times a day (19 Feb 2025 06:54)  aspirin 81 mg oral tablet: orally once a day (19 Feb 2025 06:54)  clopidogrel 75 mg oral tablet: 1 tab(s) orally once a day (19 Feb 2025 06:54)  MELOXICAM 15 MG TABLET:  (19 Feb 2025 06:54)  metoprolol succinate 50 mg oral capsule, extended release: 1 cap(s) orally once a day (at bedtime) (19 Feb 2025 06:54)  omeprazole 40 mg oral delayed release capsule: 1 cap(s) orally once a day (19 Feb 2025 06:54)  rosuvastatin 20 mg oral tablet: 1 tab(s) orally once a day (at bedtime) (19 Feb 2025 06:54)  valsartan 160 mg oral tablet: 1 tab(s) orally once a day (19 Feb 2025 06:54)      Pharmacologic DVT Prophylaxis [X] YES, [] NO: Contraindication:  Heparin 5000 units q12hr  SCD's: YES b/l    GI Prophylaxis: pepcid    Post-Op Beta-Blockers: [] Yes, [X] No: contraindication: HoTN  Post-Op Aspirin:  [X] Yes, [] No: contraindication:  Post-Op Statin:  [X] Yes, [] No: contraindication:    Ambulation/Activity Status: OOB to chair    Assessment/Plan:  59y Male POD#1 s/p CABGx4  - Case and plan discussed with CTU Intensivist and CT Surgeon - Dr. Martin  - Continue CTU supportive care and ongoing plan of care as per continuing CTU rounds.   - Continue DVT/GI prophylaxis  - Incentive Spirometry 10 times an hour  - Continue to advance physical activity as tolerated and continue PT/OT as directed  1. CAD s/p CABG: Continue ASA, statin, BB. Continue ppx antibiotics: ancef and vanc. Pain control with lidocaine patch, IV tylenol, oxy, dilaudid prn. D/c fem line and swan cath today.  2. Anemia, 2/2 acute blood loss: Monitor H/H. Started ferrous sulfate and folic acid.   3. HTN: With hypotension overnight requiring levophed, goal MAP >65, no BB for now.  4. Post-op A. Fib ppx: monitor electrolytes, keep epicardial wires.  5. COPD/Hypoxia: Continue high flow O2, duoneb q6hr, advair. Keep O2sat >92%. Encourage incentive spirometry. F/u post extubation ABG.  6. DM/Glucose Control (A1c 6.0): insulin gtt prn      Social Service Disposition: TBD

## 2025-02-20 NOTE — PHYSICAL THERAPY INITIAL EVALUATION ADULT - GENERAL OBSERVATIONS, REHAB EVAL
Femoral A line removed early this am, on 4 hr bedrest. To initiate b/s PT IE once pt has been medically cleared for OOB with PT, ~11:45 am as per CTU rounding team.

## 2025-02-20 NOTE — PHYSICAL THERAPY INITIAL EVALUATION ADULT - GENERAL OBSERVATIONS, REHAB EVAL
1145 - 1215 Pt rec semifowler in bed with +tele, +HFNC switched to NRB for amb, +tele, +chest tubes x 2, +pacer, +silvestre, +A line, +IV's, in NAD with CORNELL Ho in room. Pt agreeable to b/s PT.

## 2025-02-20 NOTE — PROGRESS NOTE ADULT - SUBJECTIVE AND OBJECTIVE BOX
KATIE PATRICK  MRN#: [id number]  Subjective:  Patient was seen and evalauted on AM rounds offerring no specific compliants at this time.    OBJECTIVE:  ICU Vital Signs Last 24 Hrs  T(C): 38 (20 Feb 2025 07:00), Max: 38 (20 Feb 2025 07:00)  T(F): 100.4 (20 Feb 2025 07:00), Max: 100.4 (20 Feb 2025 07:00)  HR: 74 (20 Feb 2025 09:00) (65 - 84)  BP: 101/58 (20 Feb 2025 08:00) (101/58 - 104/55)  BP(mean): 76 (20 Feb 2025 08:00) (75 - 76)  ABP: 109/59 (20 Feb 2025 09:00) (86/56 - 135/73)  ABP(mean): 77 (20 Feb 2025 09:00) (65 - 96)  RR: 19 (20 Feb 2025 09:00) (0 - 27)  SpO2: 97% (20 Feb 2025 09:00) (92% - 100%)    O2 Parameters below as of 20 Feb 2025 09:00  Patient On (Oxygen Delivery Method): mask, aerosol  O2 Flow (L/min): 70          02-19 @ 07:01  -  02-20 @ 07:00  --------------------------------------------------------  IN: 2218.1 mL / OUT: 1473 mL / NET: 745.1 mL    02-20 @ 07:01  -  02-20 @ 10:15  --------------------------------------------------------  IN: 100 mL / OUT: 162 mL / NET: -62 mL      CAPILLARY BLOOD GLUCOSE      POCT Blood Glucose.: 110 mg/dL (20 Feb 2025 09:27)      PHYSICAL EXAM:Daily Height in cm: 187 (19 Feb 2025 17:50)    Daily   General: WN/WD NAD    HEENT:     + NCAT  + EOMI  - Conjuctival edema   - Icterus   - Thrush   - ETT  - NGT/OGT    Neck:         + FROM    - JVD     - Nodes     - Masses    + Mid-line trachea   - Tracheostomy    Chest:         - Sternal click  - Sternal drainage  + Pacing wires  + Chest tubes  - SubQ emphysema    Lungs:          + CTA   - Rhonchi    - Rales    - Wheezing     - Decreased BS   - Dullness R L    Cardiac:       + S1 + S2    + RRR   - Irregular   - S3  - S4    - Murmurs   - Rub   - Hamman’s sign     Abdomen:    + BS     + Soft    + Non-tender     - Distended    - Organomegaly  - PEG    Extremities:   - Cyanosis U/L   - Clubbing  U/L  + LE Edema   + Capillary refill    + Pulses     Neuro:        + Awake   +  Alert   - Confused   - Lethargic   - Sedated   - Generalized Weakness    Skin:        - Rashes    - Erythema   + Normal incisions   + IV sites intact  - Sacral decubitus    HOSPITAL MEDICATIONS:  MEDICATIONS  (STANDING):  acetaminophen   IVPB .. 1000 milliGRAM(s) IV Intermittent once  acetaminophen   IVPB .. 1000 milliGRAM(s) IV Intermittent once  albuterol/ipratropium for Nebulization 3 milliLiter(s) Nebulizer every 6 hours  aspirin enteric coated 81 milliGRAM(s) Oral daily  ceFAZolin   IVPB 2000 milliGRAM(s) IV Intermittent every 8 hours  chlorhexidine 2% Cloths 1 Application(s) Topical daily  dextrose 50% Injectable 50 milliLiter(s) IV Push every 15 minutes  dextrose 50% Injectable 25 milliLiter(s) IV Push every 15 minutes  famotidine    Tablet 20 milliGRAM(s) Oral every 12 hours  ferrous    sulfate 325 milliGRAM(s) Oral daily  fluticasone propionate/ salmeterol 100-50 MICROgram(s) Diskus 1 Dose(s) Inhalation two times a day  folic acid 1 milliGRAM(s) Oral daily  heparin   Injectable 5000 Unit(s) SubCutaneous every 12 hours  insulin regular Infusion 5 Unit(s)/Hr (5 mL/Hr) IV Continuous <Continuous>  lidocaine   4% Patch 1 Patch Transdermal daily  niCARdipine Infusion 5 mG/Hr (25 mL/Hr) IV Continuous <Continuous>  nitroglycerin  Infusion 15 MICROgram(s)/Min (4.5 mL/Hr) IV Continuous <Continuous>  norepinephrine Infusion 0.05 MICROgram(s)/kG/Min (11.4 mL/Hr) IV Continuous <Continuous>  polyethylene glycol 3350 17 Gram(s) Oral daily  rosuvastatin 20 milliGRAM(s) Oral at bedtime  senna 2 Tablet(s) Oral at bedtime  sodium chloride 0.9%. 1000 milliLiter(s) (20 mL/Hr) IV Continuous <Continuous>  vancomycin  IVPB 1000 milliGRAM(s) IV Intermittent every 12 hours  vasopressin Infusion 0.04 Unit(s)/Min (6 mL/Hr) IV Continuous <Continuous>    MEDICATIONS  (PRN):  HYDROmorphone  Injectable 0.5 milliGRAM(s) IV Push every 6 hours PRN Breakthrough Pain  oxyCODONE    IR 5 milliGRAM(s) Oral every 4 hours PRN Moderate Pain (4 - 6)  oxyCODONE    IR 10 milliGRAM(s) Oral every 4 hours PRN Severe Pain (7 - 10)      LABS:                        8.9    5.43  )-----------( 109      ( 20 Feb 2025 02:30 )             26.4    02-20    143  |  109  |  17  ----------------------------<  102[H]  4.1   |  25  |  1.2    Ca    7.9[L]      20 Feb 2025 02:30  Mg     2.0     02-20    TPro  5.2[L]  /  Alb  3.8  /  TBili  1.0  /  DBili  x   /  AST  76[H]  /  ALT  34  /  AlkPhos  31  02-20    PT/INR - ( 20 Feb 2025 02:30 )   PT: 15.20 sec;   INR: 1.28 ratio         PTT - ( 20 Feb 2025 02:30 )  PTT:36.7 sec LIVER FUNCTIONS - ( 20 Feb 2025 02:30 )  Alb: 3.8 g/dL / Pro: 5.2 g/dL / ALK PHOS: 31 U/L / ALT: 34 U/L / AST: 76 U/L / GGT: x           Urinalysis Basic - ( 20 Feb 2025 02:30 )    Color: x / Appearance: x / SG: x / pH: x  Gluc: 102 mg/dL / Ketone: x  / Bili: x / Urobili: x   Blood: x / Protein: x / Nitrite: x   Leuk Esterase: x / RBC: x / WBC x   Sq Epi: x / Non Sq Epi: x / Bacteria: x        RADIOLOGY:  X Reviewed and interpreted by me: bibasilar opacities    CARDIOPULMONARY DYSFUNCTION  - Respiratory status required supplemental oxygen & the following of continuous pulse oximetry for support & to prevent decompensation  - Continued early mobilization as tolerated  - Addressed analgesic regimen to optimize function    PREVENTION-PROPHYLAXIS  - ASA continued for graft occlusion-thromboembolism prophylaxis  - Rosuvastatin was also started for long term graft patency  - Heparin initiated for VTE prophylaxis in addition to Venodyne boots  - Pepcid maintained for GI bleeding prophylaxis  - Metabolic stability & infection prophylaxis required review and adjustment of regular Insulin sliding scale and gylcemic regimen while following serial glucose levels to help achieve & maintain euglycemia  - Reviewed & addressed surgical site infection prophylaxis regimen

## 2025-02-20 NOTE — PATIENT PROFILE ADULT - FALL HARM RISK - UNIVERSAL INTERVENTIONS
Bed in lowest position, wheels locked, appropriate side rails in place/Call bell, personal items and telephone in reach/Instruct patient to call for assistance before getting out of bed or chair/Non-slip footwear when patient is out of bed/[place] to call system/Physically safe environment - no spills, clutter or unnecessary equipment/Purposeful Proactive Rounding/Room/bathroom lighting operational, light cord in reach

## 2025-02-20 NOTE — PHYSICAL THERAPY INITIAL EVALUATION ADULT - ADDITIONAL COMMENTS
Pt lives with family in pvt home, no stairs reported, independent with functional mobility without AD prior to admission.

## 2025-02-20 NOTE — PROGRESS NOTE ADULT - ASSESSMENT
Assessment/Plan:  CAD-s/p CABG x 4-POD #1  1-BP control-start beta-blockers  2-serum glucose control-insulin infusion  3-fluid overload-gentle diuresis  4-acute blood loss anemia-stable, monitor Hb/Hct  1-csxnuawrsaavbiya-pridfd, s/p transfusion plts, monitor daily  6-COPD/Hypoxemia-bronchodilator tx, pulmonary toilet, chest PT, incentive spirometry, high flow O2  3-[id number]-zrzmgu, avoid nephrotoxins, monitor daily  8-stable CO/CI-d/c pulmonary artery catheter    45 minutes of critical care services provided

## 2025-02-20 NOTE — PHYSICAL THERAPY INITIAL EVALUATION ADULT - GAIT DEVIATIONS NOTED, PT EVAL
Slow moving, forward flexed posture with downward gaze, decreased step length/height/decreased orquidea/decreased step length/decreased stride length/decreased weight-shifting ability

## 2025-02-20 NOTE — PHYSICAL THERAPY INITIAL EVALUATION ADULT - GAIT TRAINING, PT EVAL
by discharge: Pt will amb 400 ft independently using RW as needed and negotiate 5 steps with supervision using handrail.

## 2025-02-20 NOTE — PHYSICAL THERAPY INITIAL EVALUATION ADULT - PRECAUTIONS/LIMITATIONS, REHAB EVAL
Patient's first and last name, , procedure, and correct site confirmed prior to the start of procedure. Patient's first and last name, , procedure, and correct site confirmed prior to the start of procedure. Patient's first and last name, , procedure, and correct site confirmed prior to the start of procedure. Patient's first and last name, , procedure, and correct site confirmed prior to the start of procedure. cardiac precautions/fall precautions/sternal precautions

## 2025-02-21 LAB
ALBUMIN SERPL ELPH-MCNC: 3.7 G/DL — SIGNIFICANT CHANGE UP (ref 3.5–5.2)
ALBUMIN SERPL ELPH-MCNC: 3.7 G/DL — SIGNIFICANT CHANGE UP (ref 3.5–5.2)
ALP SERPL-CCNC: 49 U/L — SIGNIFICANT CHANGE UP (ref 30–115)
ALP SERPL-CCNC: 51 U/L — SIGNIFICANT CHANGE UP (ref 30–115)
ALT FLD-CCNC: 33 U/L — SIGNIFICANT CHANGE UP (ref 0–41)
ALT FLD-CCNC: 42 U/L — HIGH (ref 0–41)
ANION GAP SERPL CALC-SCNC: 8 MMOL/L — SIGNIFICANT CHANGE UP (ref 7–14)
ANION GAP SERPL CALC-SCNC: 8 MMOL/L — SIGNIFICANT CHANGE UP (ref 7–14)
APTT BLD: 31.6 SEC — SIGNIFICANT CHANGE UP (ref 27–39.2)
AST SERPL-CCNC: 106 U/L — HIGH (ref 0–41)
AST SERPL-CCNC: 83 U/L — HIGH (ref 0–41)
BASOPHILS # BLD AUTO: 0.03 K/UL — SIGNIFICANT CHANGE UP (ref 0–0.2)
BASOPHILS NFR BLD AUTO: 0.3 % — SIGNIFICANT CHANGE UP (ref 0–1)
BILIRUB SERPL-MCNC: 0.5 MG/DL — SIGNIFICANT CHANGE UP (ref 0.2–1.2)
BILIRUB SERPL-MCNC: 0.8 MG/DL — SIGNIFICANT CHANGE UP (ref 0.2–1.2)
BUN SERPL-MCNC: 23 MG/DL — HIGH (ref 10–20)
BUN SERPL-MCNC: 24 MG/DL — HIGH (ref 10–20)
CALCIUM SERPL-MCNC: 7.4 MG/DL — LOW (ref 8.4–10.5)
CALCIUM SERPL-MCNC: 7.8 MG/DL — LOW (ref 8.4–10.4)
CHLORIDE SERPL-SCNC: 104 MMOL/L — SIGNIFICANT CHANGE UP (ref 98–110)
CHLORIDE SERPL-SCNC: 105 MMOL/L — SIGNIFICANT CHANGE UP (ref 98–110)
CO2 SERPL-SCNC: 25 MMOL/L — SIGNIFICANT CHANGE UP (ref 17–32)
CO2 SERPL-SCNC: 25 MMOL/L — SIGNIFICANT CHANGE UP (ref 17–32)
CREAT SERPL-MCNC: 1.1 MG/DL — SIGNIFICANT CHANGE UP (ref 0.7–1.5)
CREAT SERPL-MCNC: 1.2 MG/DL — SIGNIFICANT CHANGE UP (ref 0.7–1.5)
EGFR: 69 ML/MIN/1.73M2 — SIGNIFICANT CHANGE UP
EGFR: 69 ML/MIN/1.73M2 — SIGNIFICANT CHANGE UP
EGFR: 76 ML/MIN/1.73M2 — SIGNIFICANT CHANGE UP
EGFR: 76 ML/MIN/1.73M2 — SIGNIFICANT CHANGE UP
EOSINOPHIL # BLD AUTO: 0.01 K/UL — SIGNIFICANT CHANGE UP (ref 0–0.7)
EOSINOPHIL NFR BLD AUTO: 0.1 % — SIGNIFICANT CHANGE UP (ref 0–8)
GAS PNL BLDA: SIGNIFICANT CHANGE UP
GLUCOSE BLDC GLUCOMTR-MCNC: 134 MG/DL — HIGH (ref 70–99)
GLUCOSE BLDC GLUCOMTR-MCNC: 135 MG/DL — HIGH (ref 70–99)
GLUCOSE BLDC GLUCOMTR-MCNC: 140 MG/DL — HIGH (ref 70–99)
GLUCOSE BLDC GLUCOMTR-MCNC: 160 MG/DL — HIGH (ref 70–99)
GLUCOSE SERPL-MCNC: 142 MG/DL — HIGH (ref 70–99)
GLUCOSE SERPL-MCNC: 167 MG/DL — HIGH (ref 70–99)
HCT VFR BLD CALC: 25.3 % — LOW (ref 42–52)
HGB BLD-MCNC: 8.4 G/DL — LOW (ref 14–18)
IMM GRANULOCYTES NFR BLD AUTO: 0.3 % — SIGNIFICANT CHANGE UP (ref 0.1–0.3)
INR BLD: 1.5 RATIO — HIGH (ref 0.65–1.3)
LYMPHOCYTES # BLD AUTO: 0.83 K/UL — LOW (ref 1.2–3.4)
LYMPHOCYTES # BLD AUTO: 9.2 % — LOW (ref 20.5–51.1)
MAGNESIUM SERPL-MCNC: 2.1 MG/DL — SIGNIFICANT CHANGE UP (ref 1.8–2.4)
MCHC RBC-ENTMCNC: 29.6 PG — SIGNIFICANT CHANGE UP (ref 27–31)
MCHC RBC-ENTMCNC: 33.2 G/DL — SIGNIFICANT CHANGE UP (ref 32–37)
MCV RBC AUTO: 89.1 FL — SIGNIFICANT CHANGE UP (ref 80–94)
MONOCYTES # BLD AUTO: 0.91 K/UL — HIGH (ref 0.1–0.6)
MONOCYTES NFR BLD AUTO: 10.1 % — HIGH (ref 1.7–9.3)
NEUTROPHILS # BLD AUTO: 7.21 K/UL — HIGH (ref 1.4–6.5)
NEUTROPHILS NFR BLD AUTO: 80 % — HIGH (ref 42.2–75.2)
NRBC BLD AUTO-RTO: 0 /100 WBCS — SIGNIFICANT CHANGE UP (ref 0–0)
PLATELET # BLD AUTO: 100 K/UL — LOW (ref 130–400)
PMV BLD: 10.5 FL — HIGH (ref 7.4–10.4)
POTASSIUM SERPL-MCNC: 4.1 MMOL/L — SIGNIFICANT CHANGE UP (ref 3.5–5)
POTASSIUM SERPL-MCNC: 4.4 MMOL/L — SIGNIFICANT CHANGE UP (ref 3.5–5)
POTASSIUM SERPL-SCNC: 4.1 MMOL/L — SIGNIFICANT CHANGE UP (ref 3.5–5)
POTASSIUM SERPL-SCNC: 4.4 MMOL/L — SIGNIFICANT CHANGE UP (ref 3.5–5)
PROT SERPL-MCNC: 5.1 G/DL — LOW (ref 6–8)
PROT SERPL-MCNC: 5.6 G/DL — LOW (ref 6–8)
PROTHROM AB SERPL-ACNC: 17.9 SEC — HIGH (ref 9.95–12.87)
RBC # BLD: 2.84 M/UL — LOW (ref 4.7–6.1)
RBC # FLD: 13 % — SIGNIFICANT CHANGE UP (ref 11.5–14.5)
SODIUM SERPL-SCNC: 137 MMOL/L — SIGNIFICANT CHANGE UP (ref 135–146)
SODIUM SERPL-SCNC: 138 MMOL/L — SIGNIFICANT CHANGE UP (ref 135–146)
WBC # BLD: 9.02 K/UL — SIGNIFICANT CHANGE UP (ref 4.8–10.8)
WBC # FLD AUTO: 9.02 K/UL — SIGNIFICANT CHANGE UP (ref 4.8–10.8)

## 2025-02-21 PROCEDURE — 99291 CRITICAL CARE FIRST HOUR: CPT

## 2025-02-21 PROCEDURE — 99232 SBSQ HOSP IP/OBS MODERATE 35: CPT | Mod: 24

## 2025-02-21 PROCEDURE — 93010 ELECTROCARDIOGRAM REPORT: CPT

## 2025-02-21 PROCEDURE — 71045 X-RAY EXAM CHEST 1 VIEW: CPT | Mod: 26

## 2025-02-21 RX ORDER — AMIODARONE HYDROCHLORIDE 50 MG/ML
150 INJECTION, SOLUTION INTRAVENOUS ONCE
Refills: 0 | Status: COMPLETED | OUTPATIENT
Start: 2025-02-21 | End: 2025-02-21

## 2025-02-21 RX ORDER — SODIUM CHLORIDE 9 G/1000ML
1000 INJECTION, SOLUTION INTRAVENOUS
Refills: 0 | Status: DISCONTINUED | OUTPATIENT
Start: 2025-02-21 | End: 2025-03-01

## 2025-02-21 RX ORDER — AMIODARONE HYDROCHLORIDE 50 MG/ML
1 INJECTION, SOLUTION INTRAVENOUS
Qty: 450 | Refills: 0 | Status: DISCONTINUED | OUTPATIENT
Start: 2025-02-21 | End: 2025-02-23

## 2025-02-21 RX ORDER — IRON SUCROSE 20 MG/ML
200 INJECTION, SOLUTION INTRAVENOUS EVERY 24 HOURS
Refills: 0 | Status: COMPLETED | OUTPATIENT
Start: 2025-02-21 | End: 2025-02-24

## 2025-02-21 RX ORDER — INSULIN LISPRO 100 U/ML
INJECTION, SOLUTION INTRAVENOUS; SUBCUTANEOUS AT BEDTIME
Refills: 0 | Status: DISCONTINUED | OUTPATIENT
Start: 2025-02-21 | End: 2025-03-01

## 2025-02-21 RX ORDER — FENTANYL CITRATE-0.9 % NACL/PF 100MCG/2ML
50 SYRINGE (ML) INTRAVENOUS ONCE
Refills: 0 | Status: DISCONTINUED | OUTPATIENT
Start: 2025-02-21 | End: 2025-02-21

## 2025-02-21 RX ORDER — INSULIN LISPRO 100 U/ML
INJECTION, SOLUTION INTRAVENOUS; SUBCUTANEOUS
Refills: 0 | Status: DISCONTINUED | OUTPATIENT
Start: 2025-02-21 | End: 2025-03-01

## 2025-02-21 RX ORDER — FUROSEMIDE 10 MG/ML
40 INJECTION INTRAMUSCULAR; INTRAVENOUS ONCE
Refills: 0 | Status: COMPLETED | OUTPATIENT
Start: 2025-02-21 | End: 2025-02-21

## 2025-02-21 RX ORDER — PROCAINAMIDE HCL 500 MG
1000 TABLET, EXTENDED RELEASE ORAL ONCE
Refills: 0 | Status: COMPLETED | OUTPATIENT
Start: 2025-02-21 | End: 2025-02-21

## 2025-02-21 RX ORDER — LABETALOL HYDROCHLORIDE 200 MG/1
10 TABLET, FILM COATED ORAL
Refills: 0 | Status: DISCONTINUED | OUTPATIENT
Start: 2025-02-21 | End: 2025-03-01

## 2025-02-21 RX ORDER — KETOROLAC TROMETHAMINE 30 MG/ML
15 INJECTION, SOLUTION INTRAMUSCULAR; INTRAVENOUS ONCE
Refills: 0 | Status: DISCONTINUED | OUTPATIENT
Start: 2025-02-21 | End: 2025-02-21

## 2025-02-21 RX ORDER — METOPROLOL SUCCINATE 50 MG/1
25 TABLET, EXTENDED RELEASE ORAL EVERY 12 HOURS
Refills: 0 | Status: DISCONTINUED | OUTPATIENT
Start: 2025-02-21 | End: 2025-02-21

## 2025-02-21 RX ORDER — AMLODIPINE BESYLATE 10 MG/1
5 TABLET ORAL DAILY
Refills: 0 | Status: DISCONTINUED | OUTPATIENT
Start: 2025-02-21 | End: 2025-02-25

## 2025-02-21 RX ORDER — METOPROLOL SUCCINATE 50 MG/1
25 TABLET, EXTENDED RELEASE ORAL EVERY 8 HOURS
Refills: 0 | Status: DISCONTINUED | OUTPATIENT
Start: 2025-02-21 | End: 2025-02-24

## 2025-02-21 RX ORDER — GLUCAGON 3 MG/1
1 POWDER NASAL ONCE
Refills: 0 | Status: DISCONTINUED | OUTPATIENT
Start: 2025-02-21 | End: 2025-03-01

## 2025-02-21 RX ORDER — DIGOXIN 250 UG/1
500 TABLET ORAL ONCE
Refills: 0 | Status: COMPLETED | OUTPATIENT
Start: 2025-02-21 | End: 2025-02-21

## 2025-02-21 RX ORDER — IRON SUCROSE 20 MG/ML
400 INJECTION, SOLUTION INTRAVENOUS EVERY 24 HOURS
Refills: 0 | Status: DISCONTINUED | OUTPATIENT
Start: 2025-02-21 | End: 2025-02-21

## 2025-02-21 RX ORDER — DEXTROSE 50 % IN WATER 50 %
15 SYRINGE (ML) INTRAVENOUS ONCE
Refills: 0 | Status: DISCONTINUED | OUTPATIENT
Start: 2025-02-21 | End: 2025-03-01

## 2025-02-21 RX ORDER — FUROSEMIDE 10 MG/ML
40 INJECTION INTRAMUSCULAR; INTRAVENOUS DAILY
Refills: 0 | Status: DISCONTINUED | OUTPATIENT
Start: 2025-02-21 | End: 2025-02-25

## 2025-02-21 RX ADMIN — Medication 1 DOSE(S): at 21:10

## 2025-02-21 RX ADMIN — METOPROLOL SUCCINATE 25 MILLIGRAM(S): 50 TABLET, EXTENDED RELEASE ORAL at 21:06

## 2025-02-21 RX ADMIN — Medication 10 MILLIGRAM(S): at 19:05

## 2025-02-21 RX ADMIN — LABETALOL HYDROCHLORIDE 10 MILLIGRAM(S): 200 TABLET, FILM COATED ORAL at 13:10

## 2025-02-21 RX ADMIN — Medication 500 MILLIGRAM(S): at 11:50

## 2025-02-21 RX ADMIN — OXYCODONE HYDROCHLORIDE 10 MILLIGRAM(S): 30 TABLET ORAL at 13:01

## 2025-02-21 RX ADMIN — OXYCODONE HYDROCHLORIDE 10 MILLIGRAM(S): 30 TABLET ORAL at 22:00

## 2025-02-21 RX ADMIN — Medication 20 MILLIGRAM(S): at 17:15

## 2025-02-21 RX ADMIN — LIDOCAINE HYDROCHLORIDE 1 PATCH: 20 JELLY TOPICAL at 18:42

## 2025-02-21 RX ADMIN — Medication 20 MILLIEQUIVALENT(S): at 17:49

## 2025-02-21 RX ADMIN — KETOROLAC TROMETHAMINE 15 MILLIGRAM(S): 30 INJECTION, SOLUTION INTRAMUSCULAR; INTRAVENOUS at 18:15

## 2025-02-21 RX ADMIN — Medication 400 MILLIGRAM(S): at 00:14

## 2025-02-21 RX ADMIN — OXYCODONE HYDROCHLORIDE 10 MILLIGRAM(S): 30 TABLET ORAL at 05:59

## 2025-02-21 RX ADMIN — Medication 50 MICROGRAM(S): at 00:30

## 2025-02-21 RX ADMIN — AMLODIPINE BESYLATE 5 MILLIGRAM(S): 10 TABLET ORAL at 09:43

## 2025-02-21 RX ADMIN — LIDOCAINE HYDROCHLORIDE 1 PATCH: 20 JELLY TOPICAL at 11:47

## 2025-02-21 RX ADMIN — Medication 100 MILLIGRAM(S): at 13:01

## 2025-02-21 RX ADMIN — AMIODARONE HYDROCHLORIDE 33.3 MG/MIN: 50 INJECTION, SOLUTION INTRAVENOUS at 23:08

## 2025-02-21 RX ADMIN — HEPARIN SODIUM 5000 UNIT(S): 1000 INJECTION INTRAVENOUS; SUBCUTANEOUS at 05:50

## 2025-02-21 RX ADMIN — DIGOXIN 500 MICROGRAM(S): 250 TABLET ORAL at 22:54

## 2025-02-21 RX ADMIN — OXYCODONE HYDROCHLORIDE 10 MILLIGRAM(S): 30 TABLET ORAL at 06:59

## 2025-02-21 RX ADMIN — Medication 0.5 MILLIGRAM(S): at 02:46

## 2025-02-21 RX ADMIN — Medication 50 MICROGRAM(S): at 14:34

## 2025-02-21 RX ADMIN — HEPARIN SODIUM 5000 UNIT(S): 1000 INJECTION INTRAVENOUS; SUBCUTANEOUS at 17:15

## 2025-02-21 RX ADMIN — OXYCODONE HYDROCHLORIDE 10 MILLIGRAM(S): 30 TABLET ORAL at 21:07

## 2025-02-21 RX ADMIN — Medication 520 MILLIGRAM(S): at 22:51

## 2025-02-21 RX ADMIN — OXYCODONE HYDROCHLORIDE 10 MILLIGRAM(S): 30 TABLET ORAL at 12:01

## 2025-02-21 RX ADMIN — AMIODARONE HYDROCHLORIDE 600 MILLIGRAM(S): 50 INJECTION, SOLUTION INTRAVENOUS at 22:51

## 2025-02-21 RX ADMIN — POLYETHYLENE GLYCOL 3350 17 GRAM(S): 17 POWDER, FOR SOLUTION ORAL at 11:48

## 2025-02-21 RX ADMIN — Medication 50 MICROGRAM(S): at 14:00

## 2025-02-21 RX ADMIN — METOPROLOL SUCCINATE 25 MILLIGRAM(S): 50 TABLET, EXTENDED RELEASE ORAL at 12:25

## 2025-02-21 RX ADMIN — Medication 1000 MILLIGRAM(S): at 00:46

## 2025-02-21 RX ADMIN — IRON SUCROSE 100 MILLIGRAM(S): 20 INJECTION, SOLUTION INTRAVENOUS at 11:47

## 2025-02-21 RX ADMIN — LIDOCAINE HYDROCHLORIDE 1 PATCH: 20 JELLY TOPICAL at 23:05

## 2025-02-21 RX ADMIN — OXYCODONE HYDROCHLORIDE 10 MILLIGRAM(S): 30 TABLET ORAL at 01:38

## 2025-02-21 RX ADMIN — Medication 250 MILLIGRAM(S): at 05:48

## 2025-02-21 RX ADMIN — Medication 50 MICROGRAM(S): at 00:07

## 2025-02-21 RX ADMIN — KETOROLAC TROMETHAMINE 15 MILLIGRAM(S): 30 INJECTION, SOLUTION INTRAMUSCULAR; INTRAVENOUS at 18:30

## 2025-02-21 RX ADMIN — IPRATROPIUM BROMIDE AND ALBUTEROL SULFATE 3 MILLILITER(S): .5; 2.5 SOLUTION RESPIRATORY (INHALATION) at 13:47

## 2025-02-21 RX ADMIN — METOPROLOL SUCCINATE 25 MILLIGRAM(S): 50 TABLET, EXTENDED RELEASE ORAL at 05:57

## 2025-02-21 RX ADMIN — Medication 0.5 MILLIGRAM(S): at 03:11

## 2025-02-21 RX ADMIN — IPRATROPIUM BROMIDE AND ALBUTEROL SULFATE 3 MILLILITER(S): .5; 2.5 SOLUTION RESPIRATORY (INHALATION) at 20:53

## 2025-02-21 RX ADMIN — Medication 20 MILLIGRAM(S): at 05:50

## 2025-02-21 RX ADMIN — Medication 1 APPLICATION(S): at 11:50

## 2025-02-21 RX ADMIN — Medication 2 TABLET(S): at 21:06

## 2025-02-21 RX ADMIN — Medication 81 MILLIGRAM(S): at 11:49

## 2025-02-21 RX ADMIN — FOLIC ACID 1 MILLIGRAM(S): 1 TABLET ORAL at 11:49

## 2025-02-21 RX ADMIN — Medication 20 MILLIEQUIVALENT(S): at 09:43

## 2025-02-21 RX ADMIN — LABETALOL HYDROCHLORIDE 10 MILLIGRAM(S): 200 TABLET, FILM COATED ORAL at 17:45

## 2025-02-21 RX ADMIN — OXYCODONE HYDROCHLORIDE 10 MILLIGRAM(S): 30 TABLET ORAL at 02:13

## 2025-02-21 RX ADMIN — FUROSEMIDE 40 MILLIGRAM(S): 10 INJECTION INTRAMUSCULAR; INTRAVENOUS at 17:49

## 2025-02-21 RX ADMIN — Medication 100 MILLIGRAM(S): at 05:47

## 2025-02-21 RX ADMIN — FUROSEMIDE 40 MILLIGRAM(S): 10 INJECTION INTRAMUSCULAR; INTRAVENOUS at 06:55

## 2025-02-21 RX ADMIN — IPRATROPIUM BROMIDE AND ALBUTEROL SULFATE 3 MILLILITER(S): .5; 2.5 SOLUTION RESPIRATORY (INHALATION) at 08:36

## 2025-02-21 NOTE — PROGRESS NOTE ADULT - SUBJECTIVE AND OBJECTIVE BOX
OPERATIVE PROCEDURE(s): cabg x 4             POD # 2    SURGEON(s):charu    SUBJECTIVE ASSESSMENT: complaining of feeling lightheaded and of having chest pain at chest tube sites    Vital Signs Last 24 Hrs  T(C): 37.2 (21 Feb 2025 12:00), Max: 37.3 (21 Feb 2025 00:00)  T(F): 98.9 (21 Feb 2025 12:00), Max: 99.2 (21 Feb 2025 00:00)  HR: 85 (21 Feb 2025 14:00) (81 - 109)  BP: 109/64 (21 Feb 2025 14:00) (92/56 - 152/95)  BP(mean): 81 (21 Feb 2025 14:00) (70 - 118)  RR: 16 (21 Feb 2025 14:00) (13 - 38)  SpO2: 92% (21 Feb 2025 14:00) (89% - 100%)    Parameters below as of 21 Feb 2025 15:00  Patient On (Oxygen Delivery Method): nasal cannula, high flow  O2 Flow (L/min): 40  O2 Concentration (%): 50  02-20-25 @ 07:01  -  02-21-25 @ 07:00  --------------------------------------------------------  IN: 1915 mL / OUT: 1532 mL / NET: 383 mL    02-21-25 @ 07:01  - 02-21-25 @ 14:52  --------------------------------------------------------  IN: 196.5 mL / OUT: 720 mL / NET: -523.5 mL        Physical Exam  General:  Chest:  CVS:  Abd:   GI/ :  Ext:    Central Venous Catheter: Yes[ ]  No[ ] , If Yes indication:           Day #    Mckeon Catheter: Yes  [ ] , No [ ] : If yes indication:                         Day #    NGT: Yes [ ] No [  ]     If Yes Placement:                                          Day #    Post-Op Beta-Blockers: Yes [ ], No[ ], If No, then contraindication:    CHEST TUBE:  [ ] YES [ ] NO  OUTPUT:     per 24 hours    AIR LEAKS:  [ ] YES [ ] NO      EPICARDIAL WIRES:  [ ] YES [ ] NO      BOWEL MOVEMENT:  [ ] YES [ ] NO          LABS:                        8.4    9.02  )-----------( 100      ( 21 Feb 2025 01:50 )             25.3     COUMADIN:   [ ] YES [ ] NO    PT/INR - ( 21 Feb 2025 01:50 )   PT: 17.90 sec;   INR: 1.50 ratio         PTT - ( 21 Feb 2025 01:50 )  PTT:31.6 sec  02-21    137  |  104  |  23[H]  ----------------------------<  167[H]  4.1   |  25  |  1.1    Ca    7.4[L]      21 Feb 2025 01:50  Mg     2.1     02-21    TPro  5.1[L]  /  Alb  3.7  /  TBili  0.8  /  DBili  x   /  AST  106[H]  /  ALT  42[H]  /  AlkPhos  49  02-21    Urinalysis Basic - ( 21 Feb 2025 01:50 )    Color: x / Appearance: x / SG: x / pH: x  Gluc: 167 mg/dL / Ketone: x  / Bili: x / Urobili: x   Blood: x / Protein: x / Nitrite: x   Leuk Esterase: x / RBC: x / WBC x   Sq Epi: x / Non Sq Epi: x / Bacteria: x        MEDICATIONS  (STANDING):  albuterol/ipratropium for Nebulization 3 milliLiter(s) Nebulizer every 6 hours  amLODIPine   Tablet 5 milliGRAM(s) Oral daily  ascorbic acid 500 milliGRAM(s) Oral daily  aspirin enteric coated 81 milliGRAM(s) Oral daily  bisacodyl Suppository 10 milliGRAM(s) Rectal once  chlorhexidine 2% Cloths 1 Application(s) Topical daily  dextrose 5%. 1000 milliLiter(s) (100 mL/Hr) IV Continuous <Continuous>  dextrose 5%. 1000 milliLiter(s) (50 mL/Hr) IV Continuous <Continuous>  dextrose 50% Injectable 50 milliLiter(s) IV Push every 15 minutes  dextrose 50% Injectable 25 milliLiter(s) IV Push every 15 minutes  famotidine    Tablet 20 milliGRAM(s) Oral every 12 hours  fluticasone propionate/ salmeterol 100-50 MICROgram(s) Diskus 1 Dose(s) Inhalation two times a day  folic acid 1 milliGRAM(s) Oral daily  furosemide   Injectable 40 milliGRAM(s) IV Push daily  glucagon  Injectable 1 milliGRAM(s) IntraMuscular once  heparin   Injectable 5000 Unit(s) SubCutaneous every 12 hours  insulin lispro (ADMELOG) corrective regimen sliding scale   SubCutaneous three times a day before meals  insulin lispro (ADMELOG) corrective regimen sliding scale   SubCutaneous at bedtime  iron sucrose IVPB 200 milliGRAM(s) IV Intermittent every 24 hours  lidocaine   4% Patch 1 Patch Transdermal daily  metoprolol tartrate 25 milliGRAM(s) Oral every 8 hours  nitroglycerin  Infusion 15 MICROgram(s)/Min (4.5 mL/Hr) IV Continuous <Continuous>  polyethylene glycol 3350 17 Gram(s) Oral daily  potassium chloride    Tablet ER 20 milliEquivalent(s) Oral daily  senna 2 Tablet(s) Oral at bedtime    MEDICATIONS  (PRN):  dextrose Oral Gel 15 Gram(s) Oral once PRN Blood Glucose LESS THAN 70 milliGRAM(s)/deciliter  HYDROmorphone  Injectable 0.5 milliGRAM(s) IV Push every 6 hours PRN Breakthrough Pain  labetalol Injectable 10 milliGRAM(s) IV Push every 2 hours PRN Diastolic blood pressure > 150  oxyCODONE    IR 5 milliGRAM(s) Oral every 4 hours PRN Moderate Pain (4 - 6)  oxyCODONE    IR 10 milliGRAM(s) Oral every 4 hours PRN Severe Pain (7 - 10)      Allergies    No Known Allergies    Intolerances        Ambulation/Activity Status:        RADIOLOGY & ADDITIONAL TESTS:        Assessment/Plan:    Social Service Disposition:     OPERATIVE PROCEDURE(s): cabg x 4             POD # 2    SURGEON(s):charu    SUBJECTIVE ASSESSMENT: complaining of feeling lightheaded and of having chest pain at chest tube sites    Vital Signs Last 24 Hrs  T(C): 37.2 (21 Feb 2025 12:00), Max: 37.3 (21 Feb 2025 00:00)  T(F): 98.9 (21 Feb 2025 12:00), Max: 99.2 (21 Feb 2025 00:00)  HR: 85 (21 Feb 2025 14:00) (81 - 109)  BP: 109/64 (21 Feb 2025 14:00) (92/56 - 152/95)  BP(mean): 81 (21 Feb 2025 14:00) (70 - 118)  RR: 16 (21 Feb 2025 14:00) (13 - 38)  SpO2: 92% (21 Feb 2025 14:00) (89% - 100%)    Parameters below as of 21 Feb 2025 15:00  Patient On (Oxygen Delivery Method): nasal cannula, high flow  O2 Flow (L/min): 40  O2 Concentration (%): 50  02-20-25 @ 07:01  -  02-21-25 @ 07:00  --------------------------------------------------------  IN: 1915 mL / OUT: 1532 mL / NET: 383 mL    02-21-25 @ 07:01  -  02-21-25 @ 14:52  --------------------------------------------------------  IN: 196.5 mL / OUT: 720 mL / NET: -523.5 mL    Physical Exam:  General: NAD; A&Ox3  Cardiac: S1/S2, RRR, no rubs  Lungs: unlabored respirations, CTA b/l, no wheeze, no rales, no crackles  Abdomen: Soft/NT/ND  Sternum: Intact, no click, dressing with minimal dried blood, intact. No erythema, swelling, active discharge.  R leg wrapped in ace bandage  Extremities: Mild nonpitting edema B/L UE and LE; no cyanosis. Strength 5/5 of distal LE B/L.    LABS:                        8.4    9.02  )-----------( 100      ( 21 Feb 2025 01:50 )             25.3     COUMADIN:   [ ] YES [ x] NO    PT/INR - ( 21 Feb 2025 01:50 )   PT: 17.90 sec;   INR: 1.50 ratio         PTT - ( 21 Feb 2025 01:50 )  PTT:31.6 sec  02-21    137  |  104  |  23[H]  ----------------------------<  167[H]  4.1   |  25  |  1.1    Ca    7.4[L]      21 Feb 2025 01:50  Mg     2.1     02-21    TPro  5.1[L]  /  Alb  3.7  /  TBili  0.8  /  DBili  x   /  AST  106[H]  /  ALT  42[H]  /  AlkPhos  49  02-21    Urinalysis Basic - ( 21 Feb 2025 01:50 )    Color: x / Appearance: x / SG: x / pH: x  Gluc: 167 mg/dL / Ketone: x  / Bili: x / Urobili: x   Blood: x / Protein: x / Nitrite: x   Leuk Esterase: x / RBC: x / WBC x   Sq Epi: x / Non Sq Epi: x / Bacteria: x      MEDICATIONS  (STANDING):  albuterol/ipratropium for Nebulization 3 milliLiter(s) Nebulizer every 6 hours  amLODIPine   Tablet 5 milliGRAM(s) Oral daily  ascorbic acid 500 milliGRAM(s) Oral daily  aspirin enteric coated 81 milliGRAM(s) Oral daily  bisacodyl Suppository 10 milliGRAM(s) Rectal once  chlorhexidine 2% Cloths 1 Application(s) Topical daily  dextrose 5%. 1000 milliLiter(s) (100 mL/Hr) IV Continuous <Continuous>  dextrose 5%. 1000 milliLiter(s) (50 mL/Hr) IV Continuous <Continuous>  dextrose 50% Injectable 50 milliLiter(s) IV Push every 15 minutes  dextrose 50% Injectable 25 milliLiter(s) IV Push every 15 minutes  famotidine    Tablet 20 milliGRAM(s) Oral every 12 hours  fluticasone propionate/ salmeterol 100-50 MICROgram(s) Diskus 1 Dose(s) Inhalation two times a day  folic acid 1 milliGRAM(s) Oral daily  furosemide   Injectable 40 milliGRAM(s) IV Push daily  glucagon  Injectable 1 milliGRAM(s) IntraMuscular once  heparin   Injectable 5000 Unit(s) SubCutaneous every 12 hours  insulin lispro (ADMELOG) corrective regimen sliding scale   SubCutaneous three times a day before meals  insulin lispro (ADMELOG) corrective regimen sliding scale   SubCutaneous at bedtime  iron sucrose IVPB 200 milliGRAM(s) IV Intermittent every 24 hours  lidocaine   4% Patch 1 Patch Transdermal daily  metoprolol tartrate 25 milliGRAM(s) Oral every 8 hours  nitroglycerin  Infusion 15 MICROgram(s)/Min (4.5 mL/Hr) IV Continuous <Continuous>  polyethylene glycol 3350 17 Gram(s) Oral daily  potassium chloride    Tablet ER 20 milliEquivalent(s) Oral daily  senna 2 Tablet(s) Oral at bedtime    MEDICATIONS  (PRN):  dextrose Oral Gel 15 Gram(s) Oral once PRN Blood Glucose LESS THAN 70 milliGRAM(s)/deciliter  HYDROmorphone  Injectable 0.5 milliGRAM(s) IV Push every 6 hours PRN Breakthrough Pain  labetalol Injectable 10 milliGRAM(s) IV Push every 2 hours PRN Diastolic blood pressure > 150  oxyCODONE    IR 5 milliGRAM(s) Oral every 4 hours PRN Moderate Pain (4 - 6)  oxyCODONE    IR 10 milliGRAM(s) Oral every 4 hours PRN Severe Pain (7 - 10)      Allergies    No Known Allergies    Intolerances      Ambulation/Activity Status:    pt amb well    RADIOLOGY & ADDITIONAL TESTS:  ACC: [id number] EXAM:  XR CHEST PORTABLE ROUTINE 1V   ORDERED BY: LINH MONTALVO     PROCEDURE DATE:  02/21/2025          INTERPRETATION:  CLINICAL HISTORY: Postop    COMPARISON: February 20, 2025 time 5:39 AM    TECHNIQUE: Portable frontal chest radiograph.    FINDINGS:    Support devices: Satisfactory positioning.    Cardiac/mediastinum/hilum: Cardiomegaly, thoracic aortic calcification,   status post median sternotomy, CABG..    Lung parenchyma/Pleura: Bibasilar opacities.    Skeleton/soft tissues: Stable.    IMPRESSION:    Right basilar opacity, decreased. Left basilar opacity, increased.   Redemonstration cardiomegaly    --- End of Report ---    Assessment/Plan:  59y Male POD#2 s/p CABGx4  - Case and plan discussed with CTU Intensivist and CT Surgeon - Dr. Martin  - Continue CTU supportive care and ongoing plan of care as per continuing CTU rounds.   - Continue DVT/GI prophylaxis  - Incentive Spirometry 10 times an hour  - Continue to advance physical activity as tolerated and continue PT/OT as directed  1. CAD s/p CABG: Continue ASA, statin, BB. Continue ppx antibiotics: ancef and vanc. Pain control with lidocaine patch, IV tylenol, oxy, dilaudid prn. D/c mediastinal chest tubes   2. Anemia, 2/2 acute blood loss: Monitor H/H. Start venofer and folic acid.   3. HTN: With hypotension overnight requiring levophed, goal MAP >65, no BB for now.  4. Post-op A. Fib ppx: monitor electrolytes, keep epicardial wires.  5. COPD/Hypoxia: Continue high flow O2, duoneb q6hr, advair. Keep O2sat >92%. Encourage incentive spirometry. F/u post extubation ABG.  6. DM/Glucose Control (A1c 6.0): correction schedule  dvt/gi ppx     OPERATIVE PROCEDURE(s): cabg x 4             POD # 2    SURGEON(s):charu    SUBJECTIVE ASSESSMENT: complaining of feeling lightheaded and of having chest pain at chest tube sites    Vital Signs Last 24 Hrs  T(C): 37.2 (21 Feb 2025 12:00), Max: 37.3 (21 Feb 2025 00:00)  T(F): 98.9 (21 Feb 2025 12:00), Max: 99.2 (21 Feb 2025 00:00)  HR: 85 (21 Feb 2025 14:00) (81 - 109)  BP: 109/64 (21 Feb 2025 14:00) (92/56 - 152/95)  BP(mean): 81 (21 Feb 2025 14:00) (70 - 118)  RR: 16 (21 Feb 2025 14:00) (13 - 38)  SpO2: 92% (21 Feb 2025 14:00) (89% - 100%)    Parameters below as of 21 Feb 2025 15:00  Patient On (Oxygen Delivery Method): nasal cannula, high flow  O2 Flow (L/min): 40  O2 Concentration (%): 50  02-20-25 @ 07:01  -  02-21-25 @ 07:00  --------------------------------------------------------  IN: 1915 mL / OUT: 1532 mL / NET: 383 mL    02-21-25 @ 07:01  -  02-21-25 @ 14:52  --------------------------------------------------------  IN: 196.5 mL / OUT: 720 mL / NET: -523.5 mL    Physical Exam:  General: NAD; A&Ox3  Cardiac: S1/S2, RRR, no rubs  Lungs: unlabored respirations, CTA b/l, no wheeze, no rales, no crackles  Abdomen: Soft/NT/ND  Sternum: Intact, no click, dressing with minimal dried blood, intact. No erythema, swelling, active discharge.  R leg wrapped in ace bandage  Extremities: Mild nonpitting edema B/L UE and LE; no cyanosis. Strength 5/5 of distal LE B/L.    LABS:                        8.4    9.02  )-----------( 100      ( 21 Feb 2025 01:50 )             25.3     COUMADIN:   [ ] YES [ x] NO    PT/INR - ( 21 Feb 2025 01:50 )   PT: 17.90 sec;   INR: 1.50 ratio         PTT - ( 21 Feb 2025 01:50 )  PTT:31.6 sec  02-21    137  |  104  |  23[H]  ----------------------------<  167[H]  4.1   |  25  |  1.1    Ca    7.4[L]      21 Feb 2025 01:50  Mg     2.1     02-21    TPro  5.1[L]  /  Alb  3.7  /  TBili  0.8  /  DBili  x   /  AST  106[H]  /  ALT  42[H]  /  AlkPhos  49  02-21    Urinalysis Basic - ( 21 Feb 2025 01:50 )    Color: x / Appearance: x / SG: x / pH: x  Gluc: 167 mg/dL / Ketone: x  / Bili: x / Urobili: x   Blood: x / Protein: x / Nitrite: x   Leuk Esterase: x / RBC: x / WBC x   Sq Epi: x / Non Sq Epi: x / Bacteria: x      MEDICATIONS  (STANDING):  albuterol/ipratropium for Nebulization 3 milliLiter(s) Nebulizer every 6 hours  amLODIPine   Tablet 5 milliGRAM(s) Oral daily  ascorbic acid 500 milliGRAM(s) Oral daily  aspirin enteric coated 81 milliGRAM(s) Oral daily  bisacodyl Suppository 10 milliGRAM(s) Rectal once  chlorhexidine 2% Cloths 1 Application(s) Topical daily  dextrose 5%. 1000 milliLiter(s) (100 mL/Hr) IV Continuous <Continuous>  dextrose 5%. 1000 milliLiter(s) (50 mL/Hr) IV Continuous <Continuous>  dextrose 50% Injectable 50 milliLiter(s) IV Push every 15 minutes  dextrose 50% Injectable 25 milliLiter(s) IV Push every 15 minutes  famotidine    Tablet 20 milliGRAM(s) Oral every 12 hours  fluticasone propionate/ salmeterol 100-50 MICROgram(s) Diskus 1 Dose(s) Inhalation two times a day  folic acid 1 milliGRAM(s) Oral daily  furosemide   Injectable 40 milliGRAM(s) IV Push daily  glucagon  Injectable 1 milliGRAM(s) IntraMuscular once  heparin   Injectable 5000 Unit(s) SubCutaneous every 12 hours  insulin lispro (ADMELOG) corrective regimen sliding scale   SubCutaneous three times a day before meals  insulin lispro (ADMELOG) corrective regimen sliding scale   SubCutaneous at bedtime  iron sucrose IVPB 200 milliGRAM(s) IV Intermittent every 24 hours  lidocaine   4% Patch 1 Patch Transdermal daily  metoprolol tartrate 25 milliGRAM(s) Oral every 8 hours  nitroglycerin  Infusion 15 MICROgram(s)/Min (4.5 mL/Hr) IV Continuous <Continuous>  polyethylene glycol 3350 17 Gram(s) Oral daily  potassium chloride    Tablet ER 20 milliEquivalent(s) Oral daily  senna 2 Tablet(s) Oral at bedtime    MEDICATIONS  (PRN):  dextrose Oral Gel 15 Gram(s) Oral once PRN Blood Glucose LESS THAN 70 milliGRAM(s)/deciliter  HYDROmorphone  Injectable 0.5 milliGRAM(s) IV Push every 6 hours PRN Breakthrough Pain  labetalol Injectable 10 milliGRAM(s) IV Push every 2 hours PRN Diastolic blood pressure > 150  oxyCODONE    IR 5 milliGRAM(s) Oral every 4 hours PRN Moderate Pain (4 - 6)  oxyCODONE    IR 10 milliGRAM(s) Oral every 4 hours PRN Severe Pain (7 - 10)      Allergies    No Known Allergies    Intolerances      Ambulation/Activity Status:    pt amb well    RADIOLOGY & ADDITIONAL TESTS:  ACC: [id number] EXAM:  XR CHEST PORTABLE ROUTINE 1V   ORDERED BY: LINH MONTALVO     PROCEDURE DATE:  02/21/2025          INTERPRETATION:  CLINICAL HISTORY: Postop    COMPARISON: February 20, 2025 time 5:39 AM    TECHNIQUE: Portable frontal chest radiograph.    FINDINGS:    Support devices: Satisfactory positioning.    Cardiac/mediastinum/hilum: Cardiomegaly, thoracic aortic calcification,   status post median sternotomy, CABG..    Lung parenchyma/Pleura: Bibasilar opacities.    Skeleton/soft tissues: Stable.    IMPRESSION:    Right basilar opacity, decreased. Left basilar opacity, increased.   Redemonstration cardiomegaly    --- End of Report ---    Assessment/Plan:  59y Male POD#2 s/p CABGx4  - Case and plan discussed with CTU Intensivist and CT Surgeon - Dr. Martin  - Continue CTU supportive care and ongoing plan of care as per continuing CTU rounds.   - Continue DVT/GI prophylaxis  - Incentive Spirometry 10 times an hour  - Continue to advance physical activity as tolerated and continue PT/OT as directed  1. CAD s/p CABG: Continue ASA, and BB. Continue ppx antibiotics: ancef and vanc. Pain control with lidocaine patch, IV tylenol, oxy, dilaudid prn. D/c mediastinal chest tubes -- No statin due to elevated LFT's  2. Anemia, 2/2 acute blood loss: Monitor H/H. Start venofer and folic acid.   3. HTN: With hypotension overnight requiring levophed, goal MAP >65, no BB for now.  4. Post-op A. Fib ppx: monitor electrolytes, keep epicardial wires.  5. COPD/Hypoxia: Continue high flow O2, duoneb q6hr, advair. Keep O2sat >92%. Encourage incentive spirometry. F/u post extubation ABG.  6. DM/Glucose Control (A1c 6.0): correction schedule  dvt/gi ppx

## 2025-02-21 NOTE — PROGRESS NOTE ADULT - ASSESSMENT
Assessment/Plan:  CAD-s/p CABG x 4-POD #2  1-BP control- beta-blockers  2-serum glucose control-insulin   3-fluid overload-gentle diuresis lasix 40 iv q12  4-acute blood loss anemia-stable, monitor Hb/Hct  1-abwsdsblmopoxcws-wkxoiq, s/p transfusion plts, monitor daily  6-COPD/Hypoxemia-bronchodilator tx, pulmonary toilet, chest PT, incentive spirometry, high flow O2  6-aylvlocnwhsihnk-zwwhdu, avoid nephrotoxins, monitor daily    high flow oxygen   monitor chest tubes drainage d/c  whan appropriate

## 2025-02-21 NOTE — PROGRESS NOTE ADULT - SUBJECTIVE AND OBJECTIVE BOX
CTU Attending Progress Daily Note     21 Feb 2025 17:21  POD# - 2 CABG   He has history of Hypertension    Gallstones    History of abdominal aortic aneurysm (AAA)    Obesity    CAD (coronary artery disease)    History of hepatitis C      Interval event for past 24 hr:  KATIE PATRICK  59y had no event.   Current Complains:  KATIE PATRICK has no new complains  HPI:    OBJECTIVE:  ICU Vital Signs Last 24 Hrs  T(C): 37.2 (21 Feb 2025 16:00), Max: 37.3 (21 Feb 2025 00:00)  T(F): 99 (21 Feb 2025 16:00), Max: 99.2 (21 Feb 2025 00:00)  HR: 80 (21 Feb 2025 17:00) (80 - 109)  BP: 132/71 (21 Feb 2025 17:00) (92/56 - 152/95)  BP(mean): 94 (21 Feb 2025 17:00) (70 - 118)  ABP: 134/63 (21 Feb 2025 17:00) (98/55 - 155/82)  ABP(mean): 86 (21 Feb 2025 17:00) (65 - 106)  RR: 17 (21 Feb 2025 17:00) (13 - 38)  SpO2: 93% (21 Feb 2025 17:00) (89% - 100%)    O2 Parameters below as of 21 Feb 2025 18:00  Patient On (Oxygen Delivery Method): nasal cannula, high flow  O2 Flow (L/min): 40  O2 Concentration (%): 50      I&O's Summary    20 Feb 2025 07:01  -  21 Feb 2025 07:00  --------------------------------------------------------  IN: 1915 mL / OUT: 1532 mL / NET: 383 mL    21 Feb 2025 07:01  -  21 Feb 2025 17:21  --------------------------------------------------------  IN: 196.5 mL / OUT: 845 mL / NET: -648.5 mL      I&O's Detail    20 Feb 2025 07:01  -  21 Feb 2025 07:00  --------------------------------------------------------  IN:    IV PiggyBack: 950 mL    Nitroglycerin: 285 mL    Oral Fluid: 640 mL    sodium chloride 0.9%: 40 mL  Total IN: 1915 mL    OUT:    Chest Tube (mL): 232 mL    Chest Tube (mL): 250 mL    Indwelling Catheter - Urethral (mL): 1050 mL    NiCARdipine: 0 mL  Total OUT: 1532 mL    Total NET: 383 mL      21 Feb 2025 07:01  -  21 Feb 2025 17:21  --------------------------------------------------------  IN:    IV PiggyBack: 150 mL    Nitroglycerin: 46.5 mL  Total IN: 196.5 mL    OUT:    Chest Tube (mL): 90 mL    Chest Tube (mL): 110 mL    Indwelling Catheter - Urethral (mL): 645 mL    NiCARdipine: 0 mL  Total OUT: 845 mL    Total NET: -648.5 mL        Adult Advanced Hemodynamics Last 24 Hrs  CVP(mm Hg): --  CVP(cm H2O): --  CO: --  CI: --  PA: --  PA(mean): --  PCWP: --  SVR: --  SVRI: --  PVR: --  PVRI: --    CAPILLARY BLOOD GLUCOSE      POCT Blood Glucose.: 134 mg/dL (21 Feb 2025 17:14)  POCT Blood Glucose.: 140 mg/dL (21 Feb 2025 12:09)  POCT Blood Glucose.: 160 mg/dL (21 Feb 2025 09:01)  POCT Blood Glucose.: 139 mg/dL (20 Feb 2025 18:46)    LABS:  ABG - ( 21 Feb 2025 04:33 )  pH, Arterial: 7.39  pH, Blood: x     /  pCO2: 43    /  pO2: 62    / HCO3: 26    / Base Excess: 0.9   /  SaO2: 94.2                                    8.4    9.02  )-----------( 100      ( 21 Feb 2025 01:50 )             25.3     02-21    138  |  105  |  24[H]  ----------------------------<  142[H]  4.4   |  25  |  1.2    Ca    7.8[L]      21 Feb 2025 14:53  Mg     2.1     02-21    TPro  5.6[L]  /  Alb  3.7  /  TBili  0.5  /  DBili  x   /  AST  83[H]  /  ALT  33  /  AlkPhos  51  02-21    PT/INR - ( 21 Feb 2025 01:50 )   PT: 17.90 sec;   INR: 1.50 ratio         PTT - ( 21 Feb 2025 01:50 )  PTT:31.6 sec  Urinalysis Basic - ( 21 Feb 2025 14:53 )    Color: x / Appearance: x / SG: x / pH: x  Gluc: 142 mg/dL / Ketone: x  / Bili: x / Urobili: x   Blood: x / Protein: x / Nitrite: x   Leuk Esterase: x / RBC: x / WBC x   Sq Epi: x / Non Sq Epi: x / Bacteria: x        Home Medications:  amLODIPine 5 mg oral tablet: 1 tab(s) orally 2 times a day (19 Feb 2025 06:54)  aspirin 81 mg oral tablet: orally once a day (19 Feb 2025 06:54)  clopidogrel 75 mg oral tablet: 1 tab(s) orally once a day (19 Feb 2025 06:54)  MELOXICAM 15 MG TABLET:  (19 Feb 2025 06:54)  metoprolol succinate 50 mg oral capsule, extended release: 1 cap(s) orally once a day (at bedtime) (19 Feb 2025 06:54)  omeprazole 40 mg oral delayed release capsule: 1 cap(s) orally once a day (19 Feb 2025 06:54)  rosuvastatin 20 mg oral tablet: 1 tab(s) orally once a day (at bedtime) (19 Feb 2025 06:54)  valsartan 160 mg oral tablet: 1 tab(s) orally once a day (19 Feb 2025 06:54)    HOSPITAL MEDICATIONS:  MEDICATIONS  (STANDING):  albuterol/ipratropium for Nebulization 3 milliLiter(s) Nebulizer every 6 hours  amLODIPine   Tablet 5 milliGRAM(s) Oral daily  ascorbic acid 500 milliGRAM(s) Oral daily  aspirin enteric coated 81 milliGRAM(s) Oral daily  bisacodyl Suppository 10 milliGRAM(s) Rectal once  chlorhexidine 2% Cloths 1 Application(s) Topical daily  dextrose 5%. 1000 milliLiter(s) (50 mL/Hr) IV Continuous <Continuous>  dextrose 5%. 1000 milliLiter(s) (100 mL/Hr) IV Continuous <Continuous>  dextrose 50% Injectable 50 milliLiter(s) IV Push every 15 minutes  dextrose 50% Injectable 25 milliLiter(s) IV Push every 15 minutes  famotidine    Tablet 20 milliGRAM(s) Oral every 12 hours  fluticasone propionate/ salmeterol 100-50 MICROgram(s) Diskus 1 Dose(s) Inhalation two times a day  folic acid 1 milliGRAM(s) Oral daily  furosemide   Injectable 40 milliGRAM(s) IV Push daily  glucagon  Injectable 1 milliGRAM(s) IntraMuscular once  heparin   Injectable 5000 Unit(s) SubCutaneous every 12 hours  insulin lispro (ADMELOG) corrective regimen sliding scale   SubCutaneous three times a day before meals  insulin lispro (ADMELOG) corrective regimen sliding scale   SubCutaneous at bedtime  iron sucrose IVPB 200 milliGRAM(s) IV Intermittent every 24 hours  lidocaine   4% Patch 1 Patch Transdermal daily  metoprolol tartrate 25 milliGRAM(s) Oral every 8 hours  nitroglycerin  Infusion 15 MICROgram(s)/Min (4.5 mL/Hr) IV Continuous <Continuous>  polyethylene glycol 3350 17 Gram(s) Oral daily  potassium chloride    Tablet ER 20 milliEquivalent(s) Oral daily  senna 2 Tablet(s) Oral at bedtime    MEDICATIONS  (PRN):  dextrose Oral Gel 15 Gram(s) Oral once PRN Blood Glucose LESS THAN 70 milliGRAM(s)/deciliter  HYDROmorphone  Injectable 0.5 milliGRAM(s) IV Push every 6 hours PRN Breakthrough Pain  labetalol Injectable 10 milliGRAM(s) IV Push every 2 hours PRN Diastolic blood pressure > 150  oxyCODONE    IR 5 milliGRAM(s) Oral every 4 hours PRN Moderate Pain (4 - 6)  oxyCODONE    IR 10 milliGRAM(s) Oral every 4 hours PRN Severe Pain (7 - 10)      REVIEW OF SYSTEMS:  CONSTITUTIONAL: [X] all negative; [ ] weakness, [ ] fevers, [ ] chills  EYES/ENT: [X] all negative; [ ] visual changes, [ ] vertigo, [ ] throat pain   NECK: [X] all negative; [ ] pain, [ ] stiffness  RESPIRATORY: [] all negative, [ ] cough, [ ] wheezing, [ ] hemoptysis, [ ] shortness of breath  CARDIOVASCULAR: [] all negative; [ ] chest pain, [ ] palpitations, [ ] orthopnea  GASTROINTESTINAL: [X] all negative; [ ]abdominal pain, [ ] nausea, [ ] vomiting, [ ] hematemesis, [ ] diarrhea, [ ] constipation, [ ] melena, [ ] hematochezia.  GENITOURINARY: [X] all negative; [ ] dysuria, [ ] frequency, [ ] hematuria  NEUROLOGICAL: [X] all negative; [ ] numbness, [ ] weakness  SKIN: [X] all negative; [ ] itching, [ ] burning, [ ] rashes, [ ] lesions   All other review of systems is negative unless indicated above.    [  ] Unable to assess ROS because     PHYSICAL EXAM:          CONSTITUTIONAL: Well-developed; well-nourished; in no acute distress.   	SKIN: warm, dry  	HEAD: Normocephalic; atraumatic.  	EYES: PERRL, EOM, no conj injection, sclera clear  	ENT: No nasal discharge; airway clear.  	NECK: Supple; non tender.  No midline ttp ctls  	CARD: S1, S2 normal; no murmurs, gallops, or rubs. Regular rate and rhythm. 2+ RPs and DPs bilat, no carotid bruits, no pedal   edema, no calf pain b/l  	RESP: CTA  bilat good air movement No wheezes, rales or rhonchi.  	ABD: Soft, not tender, not distended, no CVA ttp no rebound or guarding, bowel sounds present  	EXT: Normal ROM.  No clubbing, cyanosis or edema.   	  	NEURO: Alert, awake, motor 5/5 R, 5/5 L        RADIOLOGY:  xray  reviewd  chest tubes all in place no ptx   mild congestion   I spent 45 minutes of critical care time ; more than 50% of visit was spent counseling and/or examining patient, reviewing vitals, labs, medications, imaging and discussing with the team goals of care to prevent life-threatening in this patient who is at high risk for deterioration or death due to:

## 2025-02-22 LAB
ALBUMIN SERPL ELPH-MCNC: 3.5 G/DL — SIGNIFICANT CHANGE UP (ref 3.5–5.2)
ALBUMIN SERPL ELPH-MCNC: 3.6 G/DL — SIGNIFICANT CHANGE UP (ref 3.5–5.2)
ALP SERPL-CCNC: 42 U/L — SIGNIFICANT CHANGE UP (ref 30–115)
ALP SERPL-CCNC: 48 U/L — SIGNIFICANT CHANGE UP (ref 30–115)
ALT FLD-CCNC: 25 U/L — SIGNIFICANT CHANGE UP (ref 0–41)
ALT FLD-CCNC: 26 U/L — SIGNIFICANT CHANGE UP (ref 0–41)
ANION GAP SERPL CALC-SCNC: 6 MMOL/L — LOW (ref 7–14)
ANION GAP SERPL CALC-SCNC: 9 MMOL/L — SIGNIFICANT CHANGE UP (ref 7–14)
AST SERPL-CCNC: 52 U/L — HIGH (ref 0–41)
AST SERPL-CCNC: 57 U/L — HIGH (ref 0–41)
BASOPHILS # BLD AUTO: 0.03 K/UL — SIGNIFICANT CHANGE UP (ref 0–0.2)
BASOPHILS NFR BLD AUTO: 0.4 % — SIGNIFICANT CHANGE UP (ref 0–1)
BILIRUB SERPL-MCNC: 0.4 MG/DL — SIGNIFICANT CHANGE UP (ref 0.2–1.2)
BILIRUB SERPL-MCNC: 0.6 MG/DL — SIGNIFICANT CHANGE UP (ref 0.2–1.2)
BUN SERPL-MCNC: 24 MG/DL — HIGH (ref 10–20)
BUN SERPL-MCNC: 27 MG/DL — HIGH (ref 10–20)
CALCIUM SERPL-MCNC: 7.7 MG/DL — LOW (ref 8.4–10.5)
CALCIUM SERPL-MCNC: 8.1 MG/DL — LOW (ref 8.4–10.5)
CHLORIDE SERPL-SCNC: 102 MMOL/L — SIGNIFICANT CHANGE UP (ref 98–110)
CHLORIDE SERPL-SCNC: 99 MMOL/L — SIGNIFICANT CHANGE UP (ref 98–110)
CO2 SERPL-SCNC: 28 MMOL/L — SIGNIFICANT CHANGE UP (ref 17–32)
CO2 SERPL-SCNC: 28 MMOL/L — SIGNIFICANT CHANGE UP (ref 17–32)
CREAT SERPL-MCNC: 1 MG/DL — SIGNIFICANT CHANGE UP (ref 0.7–1.5)
CREAT SERPL-MCNC: 1.2 MG/DL — SIGNIFICANT CHANGE UP (ref 0.7–1.5)
EGFR: 69 ML/MIN/1.73M2 — SIGNIFICANT CHANGE UP
EGFR: 69 ML/MIN/1.73M2 — SIGNIFICANT CHANGE UP
EGFR: 86 ML/MIN/1.73M2 — SIGNIFICANT CHANGE UP
EGFR: 86 ML/MIN/1.73M2 — SIGNIFICANT CHANGE UP
EOSINOPHIL # BLD AUTO: 0.05 K/UL — SIGNIFICANT CHANGE UP (ref 0–0.7)
EOSINOPHIL NFR BLD AUTO: 0.6 % — SIGNIFICANT CHANGE UP (ref 0–8)
GAS PNL BLDA: SIGNIFICANT CHANGE UP
GLUCOSE BLDC GLUCOMTR-MCNC: 119 MG/DL — HIGH (ref 70–99)
GLUCOSE BLDC GLUCOMTR-MCNC: 129 MG/DL — HIGH (ref 70–99)
GLUCOSE BLDC GLUCOMTR-MCNC: 138 MG/DL — HIGH (ref 70–99)
GLUCOSE BLDC GLUCOMTR-MCNC: 153 MG/DL — HIGH (ref 70–99)
GLUCOSE SERPL-MCNC: 117 MG/DL — HIGH (ref 70–99)
GLUCOSE SERPL-MCNC: 134 MG/DL — HIGH (ref 70–99)
HCT VFR BLD CALC: 24.3 % — LOW (ref 42–52)
HGB BLD-MCNC: 8.3 G/DL — LOW (ref 14–18)
IMM GRANULOCYTES NFR BLD AUTO: 0.6 % — HIGH (ref 0.1–0.3)
LYMPHOCYTES # BLD AUTO: 0.92 K/UL — LOW (ref 1.2–3.4)
LYMPHOCYTES # BLD AUTO: 11.3 % — LOW (ref 20.5–51.1)
MAGNESIUM SERPL-MCNC: 2.1 MG/DL — SIGNIFICANT CHANGE UP (ref 1.8–2.4)
MCHC RBC-ENTMCNC: 29.7 PG — SIGNIFICANT CHANGE UP (ref 27–31)
MCHC RBC-ENTMCNC: 34.2 G/DL — SIGNIFICANT CHANGE UP (ref 32–37)
MCV RBC AUTO: 87.1 FL — SIGNIFICANT CHANGE UP (ref 80–94)
MONOCYTES # BLD AUTO: 0.87 K/UL — HIGH (ref 0.1–0.6)
MONOCYTES NFR BLD AUTO: 10.7 % — HIGH (ref 1.7–9.3)
NEUTROPHILS # BLD AUTO: 6.2 K/UL — SIGNIFICANT CHANGE UP (ref 1.4–6.5)
NEUTROPHILS NFR BLD AUTO: 76.4 % — HIGH (ref 42.2–75.2)
NRBC BLD AUTO-RTO: 0 /100 WBCS — SIGNIFICANT CHANGE UP (ref 0–0)
PLATELET # BLD AUTO: 103 K/UL — LOW (ref 130–400)
PMV BLD: 10.6 FL — HIGH (ref 7.4–10.4)
POTASSIUM SERPL-MCNC: 3.4 MMOL/L — LOW (ref 3.5–5)
POTASSIUM SERPL-MCNC: 3.5 MMOL/L — SIGNIFICANT CHANGE UP (ref 3.5–5)
POTASSIUM SERPL-SCNC: 3.4 MMOL/L — LOW (ref 3.5–5)
POTASSIUM SERPL-SCNC: 3.5 MMOL/L — SIGNIFICANT CHANGE UP (ref 3.5–5)
PROT SERPL-MCNC: 5.5 G/DL — LOW (ref 6–8)
PROT SERPL-MCNC: 5.5 G/DL — LOW (ref 6–8)
RBC # BLD: 2.79 M/UL — LOW (ref 4.7–6.1)
RBC # FLD: 13 % — SIGNIFICANT CHANGE UP (ref 11.5–14.5)
SODIUM SERPL-SCNC: 136 MMOL/L — SIGNIFICANT CHANGE UP (ref 135–146)
SODIUM SERPL-SCNC: 136 MMOL/L — SIGNIFICANT CHANGE UP (ref 135–146)
WBC # BLD: 8.12 K/UL — SIGNIFICANT CHANGE UP (ref 4.8–10.8)
WBC # FLD AUTO: 8.12 K/UL — SIGNIFICANT CHANGE UP (ref 4.8–10.8)

## 2025-02-22 PROCEDURE — 99291 CRITICAL CARE FIRST HOUR: CPT

## 2025-02-22 PROCEDURE — 93010 ELECTROCARDIOGRAM REPORT: CPT

## 2025-02-22 PROCEDURE — 71045 X-RAY EXAM CHEST 1 VIEW: CPT | Mod: 26,77

## 2025-02-22 PROCEDURE — 71045 X-RAY EXAM CHEST 1 VIEW: CPT | Mod: 26

## 2025-02-22 RX ORDER — ACETAMINOPHEN 500 MG/5ML
1000 LIQUID (ML) ORAL ONCE
Refills: 0 | Status: COMPLETED | OUTPATIENT
Start: 2025-02-22 | End: 2025-02-23

## 2025-02-22 RX ORDER — BACITRACIN 500 UNIT/G
1 OINTMENT (GRAM) TOPICAL EVERY 12 HOURS
Refills: 0 | Status: DISCONTINUED | OUTPATIENT
Start: 2025-02-22 | End: 2025-03-02

## 2025-02-22 RX ORDER — ROSUVASTATIN CALCIUM 20 MG/1
20 TABLET, FILM COATED ORAL AT BEDTIME
Refills: 0 | Status: DISCONTINUED | OUTPATIENT
Start: 2025-02-22 | End: 2025-02-25

## 2025-02-22 RX ORDER — KETOROLAC TROMETHAMINE 30 MG/ML
15 INJECTION, SOLUTION INTRAMUSCULAR; INTRAVENOUS ONCE
Refills: 0 | Status: DISCONTINUED | OUTPATIENT
Start: 2025-02-22 | End: 2025-02-22

## 2025-02-22 RX ORDER — BISACODYL 5 MG
5 TABLET, DELAYED RELEASE (ENTERIC COATED) ORAL EVERY 12 HOURS
Refills: 0 | Status: DISCONTINUED | OUTPATIENT
Start: 2025-02-22 | End: 2025-03-03

## 2025-02-22 RX ORDER — CLOPIDOGREL BISULFATE 75 MG/1
75 TABLET, FILM COATED ORAL DAILY
Refills: 0 | Status: DISCONTINUED | OUTPATIENT
Start: 2025-02-22 | End: 2025-03-03

## 2025-02-22 RX ORDER — AMIODARONE HYDROCHLORIDE 50 MG/ML
0.5 INJECTION, SOLUTION INTRAVENOUS
Qty: 450 | Refills: 0 | Status: COMPLETED | OUTPATIENT
Start: 2025-02-22 | End: 2025-02-23

## 2025-02-22 RX ORDER — ACETAMINOPHEN 500 MG/5ML
1000 LIQUID (ML) ORAL ONCE
Refills: 0 | Status: COMPLETED | OUTPATIENT
Start: 2025-02-22 | End: 2025-02-22

## 2025-02-22 RX ORDER — MAGNESIUM SULFATE 500 MG/ML
1 SYRINGE (ML) INJECTION ONCE
Refills: 0 | Status: COMPLETED | OUTPATIENT
Start: 2025-02-22 | End: 2025-02-22

## 2025-02-22 RX ADMIN — HEPARIN SODIUM 5000 UNIT(S): 1000 INJECTION INTRAVENOUS; SUBCUTANEOUS at 17:03

## 2025-02-22 RX ADMIN — IRON SUCROSE 100 MILLIGRAM(S): 20 INJECTION, SOLUTION INTRAVENOUS at 12:44

## 2025-02-22 RX ADMIN — Medication 40 MILLIEQUIVALENT(S): at 05:14

## 2025-02-22 RX ADMIN — IPRATROPIUM BROMIDE AND ALBUTEROL SULFATE 3 MILLILITER(S): .5; 2.5 SOLUTION RESPIRATORY (INHALATION) at 20:31

## 2025-02-22 RX ADMIN — Medication 81 MILLIGRAM(S): at 12:43

## 2025-02-22 RX ADMIN — Medication 20 MILLIGRAM(S): at 17:03

## 2025-02-22 RX ADMIN — HEPARIN SODIUM 5000 UNIT(S): 1000 INJECTION INTRAVENOUS; SUBCUTANEOUS at 05:11

## 2025-02-22 RX ADMIN — METOPROLOL SUCCINATE 25 MILLIGRAM(S): 50 TABLET, EXTENDED RELEASE ORAL at 05:11

## 2025-02-22 RX ADMIN — Medication 40 MILLIEQUIVALENT(S): at 09:45

## 2025-02-22 RX ADMIN — LABETALOL HYDROCHLORIDE 10 MILLIGRAM(S): 200 TABLET, FILM COATED ORAL at 05:45

## 2025-02-22 RX ADMIN — Medication 20 MILLIGRAM(S): at 05:11

## 2025-02-22 RX ADMIN — Medication 1000 MILLIGRAM(S): at 16:19

## 2025-02-22 RX ADMIN — ROSUVASTATIN CALCIUM 20 MILLIGRAM(S): 20 TABLET, FILM COATED ORAL at 21:06

## 2025-02-22 RX ADMIN — LIDOCAINE HYDROCHLORIDE 1 PATCH: 20 JELLY TOPICAL at 12:42

## 2025-02-22 RX ADMIN — CLOPIDOGREL BISULFATE 75 MILLIGRAM(S): 75 TABLET, FILM COATED ORAL at 15:53

## 2025-02-22 RX ADMIN — METOPROLOL SUCCINATE 25 MILLIGRAM(S): 50 TABLET, EXTENDED RELEASE ORAL at 15:53

## 2025-02-22 RX ADMIN — Medication 500 MILLIGRAM(S): at 12:43

## 2025-02-22 RX ADMIN — Medication 5 MILLIGRAM(S): at 12:43

## 2025-02-22 RX ADMIN — OXYCODONE HYDROCHLORIDE 10 MILLIGRAM(S): 30 TABLET ORAL at 02:28

## 2025-02-22 RX ADMIN — FOLIC ACID 1 MILLIGRAM(S): 1 TABLET ORAL at 12:43

## 2025-02-22 RX ADMIN — Medication 100 GRAM(S): at 01:05

## 2025-02-22 RX ADMIN — Medication 160 MILLIGRAM(S): at 05:31

## 2025-02-22 RX ADMIN — AMIODARONE HYDROCHLORIDE 16.7 MG/MIN: 50 INJECTION, SOLUTION INTRAVENOUS at 22:37

## 2025-02-22 RX ADMIN — Medication 1 APPLICATION(S): at 17:03

## 2025-02-22 RX ADMIN — IPRATROPIUM BROMIDE AND ALBUTEROL SULFATE 3 MILLILITER(S): .5; 2.5 SOLUTION RESPIRATORY (INHALATION) at 08:41

## 2025-02-22 RX ADMIN — METOPROLOL SUCCINATE 25 MILLIGRAM(S): 50 TABLET, EXTENDED RELEASE ORAL at 21:06

## 2025-02-22 RX ADMIN — OXYCODONE HYDROCHLORIDE 10 MILLIGRAM(S): 30 TABLET ORAL at 10:15

## 2025-02-22 RX ADMIN — Medication 10 MILLIGRAM(S): at 02:11

## 2025-02-22 RX ADMIN — Medication 400 MILLIGRAM(S): at 16:04

## 2025-02-22 RX ADMIN — LABETALOL HYDROCHLORIDE 10 MILLIGRAM(S): 200 TABLET, FILM COATED ORAL at 20:57

## 2025-02-22 RX ADMIN — Medication 10 MILLIGRAM(S): at 04:41

## 2025-02-22 RX ADMIN — KETOROLAC TROMETHAMINE 15 MILLIGRAM(S): 30 INJECTION, SOLUTION INTRAMUSCULAR; INTRAVENOUS at 06:47

## 2025-02-22 RX ADMIN — POLYETHYLENE GLYCOL 3350 17 GRAM(S): 17 POWDER, FOR SOLUTION ORAL at 12:42

## 2025-02-22 RX ADMIN — FUROSEMIDE 40 MILLIGRAM(S): 10 INJECTION INTRAMUSCULAR; INTRAVENOUS at 05:11

## 2025-02-22 RX ADMIN — AMIODARONE HYDROCHLORIDE 33.3 MG/MIN: 50 INJECTION, SOLUTION INTRAVENOUS at 06:47

## 2025-02-22 RX ADMIN — OXYCODONE HYDROCHLORIDE 10 MILLIGRAM(S): 30 TABLET ORAL at 02:11

## 2025-02-22 RX ADMIN — OXYCODONE HYDROCHLORIDE 10 MILLIGRAM(S): 30 TABLET ORAL at 11:15

## 2025-02-22 RX ADMIN — AMLODIPINE BESYLATE 5 MILLIGRAM(S): 10 TABLET ORAL at 05:10

## 2025-02-22 RX ADMIN — Medication 2 TABLET(S): at 21:06

## 2025-02-22 RX ADMIN — Medication 1 DOSE(S): at 20:57

## 2025-02-22 RX ADMIN — KETOROLAC TROMETHAMINE 15 MILLIGRAM(S): 30 INJECTION, SOLUTION INTRAMUSCULAR; INTRAVENOUS at 07:02

## 2025-02-22 RX ADMIN — IPRATROPIUM BROMIDE AND ALBUTEROL SULFATE 3 MILLILITER(S): .5; 2.5 SOLUTION RESPIRATORY (INHALATION) at 14:21

## 2025-02-22 RX ADMIN — LIDOCAINE HYDROCHLORIDE 1 PATCH: 20 JELLY TOPICAL at 19:00

## 2025-02-22 RX ADMIN — Medication 40 MILLIEQUIVALENT(S): at 15:53

## 2025-02-22 RX ADMIN — INSULIN LISPRO 1: 100 INJECTION, SOLUTION INTRAVENOUS; SUBCUTANEOUS at 08:44

## 2025-02-22 RX ADMIN — Medication 1 APPLICATION(S): at 12:44

## 2025-02-22 NOTE — PROGRESS NOTE ADULT - SUBJECTIVE AND OBJECTIVE BOX
KATIE PATRICK  MRN#: [id number]  Subjective:  Patient was seen and evalauted on AM rounds offerring no specific compliants at this time.    OBJECTIVE:  ICU Vital Signs Last 24 Hrs  T(C): 37.2 (2025 08:00), Max: 37.3 (2025 20:00)  T(F): 98.9 (2025 08:00), Max: 99.2 (2025 20:00)  HR: 79 (2025 10:00) (76 - 138)  BP: 97/57 (2025 10:00) (86/50 - 138/85)  BP(mean): 71 (2025 10:00) (64 - 107)  ABP: 134/59 (2025 10:00) (103/53 - 152/73)  ABP(mean): 77 (2025 10:00) (68 - 99)  RR: 23 (2025 10:00) (12 - 27)  SpO2: 95% (2025 10:00) (91% - 97%)    O2 Parameters below as of 2025 11:00  Patient On (Oxygen Delivery Method): nasal cannula, high flow  O2 Flow (L/min): 40  O2 Concentration (%): 50         @ 07:  -   @ 07:00  --------------------------------------------------------  IN: 1306.2 mL / OUT: 3730 mL / NET: -2423.8 mL     @ 07:01  -   @ 12:09  --------------------------------------------------------  IN: 99.9 mL / OUT: 420 mL / NET: -320.1 mL      CAPILLARY BLOOD GLUCOSE      POCT Blood Glucose.: 153 mg/dL (2025 06:41)      PHYSICAL EXAM:Daily     Daily Weight in k.8 (2025 06:00)  General: WN/WD NAD    HEENT:     + NCAT  + EOMI  - Conjuctival edema   - Icterus   - Thrush   - ETT  - NGT/OGT    Neck:         + FROM    - JVD     - Nodes     - Masses    + Mid-line trachea   - Tracheostomy    Chest:         - Sternal click  - Sternal drainage  + Pacing wires  + Chest tubes  - SubQ emphysema    Lungs:          + CTA   - Rhonchi    - Rales    - Wheezing     - Decreased BS   - Dullness R L    Cardiac:       + S1 + S2    + RRR   - Irregular   - S3  - S4    - Murmurs   - Rub   - Hamman’s sign     Abdomen:    + BS     + Soft    + Non-tender     - Distended    - Organomegaly  - PEG    Extremities:   - Cyanosis U/L   - Clubbing  U/L  - LE/UE Edema   + Capillary refill    + Pulses     Neuro:        + Awake   +  Alert   - Confused   - Lethargic   - Sedated   - Generalized Weakness    Skin:        - Rashes    - Erythema   + Normal incisions   + IV sites intact  - Sacral decubitus    HOSPITAL MEDICATIONS:  MEDICATIONS  (STANDING):  albuterol/ipratropium for Nebulization 3 milliLiter(s) Nebulizer every 6 hours  aMIOdarone Infusion 1 mG/Min (33.3 mL/Hr) IV Continuous <Continuous>  amLODIPine   Tablet 5 milliGRAM(s) Oral daily  ascorbic acid 500 milliGRAM(s) Oral daily  aspirin enteric coated 81 milliGRAM(s) Oral daily  bisacodyl 5 milliGRAM(s) Oral every 12 hours  bisacodyl Suppository 10 milliGRAM(s) Rectal once  chlorhexidine 2% Cloths 1 Application(s) Topical daily  clopidogrel Tablet 75 milliGRAM(s) Oral daily  dextrose 5%. 1000 milliLiter(s) (50 mL/Hr) IV Continuous <Continuous>  dextrose 5%. 1000 milliLiter(s) (100 mL/Hr) IV Continuous <Continuous>  dextrose 50% Injectable 50 milliLiter(s) IV Push every 15 minutes  dextrose 50% Injectable 25 milliLiter(s) IV Push every 15 minutes  famotidine    Tablet 20 milliGRAM(s) Oral every 12 hours  fluticasone propionate/ salmeterol 100-50 MICROgram(s) Diskus 1 Dose(s) Inhalation two times a day  folic acid 1 milliGRAM(s) Oral daily  furosemide   Injectable 40 milliGRAM(s) IV Push daily  glucagon  Injectable 1 milliGRAM(s) IntraMuscular once  heparin   Injectable 5000 Unit(s) SubCutaneous every 12 hours  insulin lispro (ADMELOG) corrective regimen sliding scale   SubCutaneous three times a day before meals  insulin lispro (ADMELOG) corrective regimen sliding scale   SubCutaneous at bedtime  iron sucrose IVPB 200 milliGRAM(s) IV Intermittent every 24 hours  lidocaine   4% Patch 1 Patch Transdermal daily  metolazone 5 milliGRAM(s) Oral daily  metoprolol tartrate 25 milliGRAM(s) Oral every 8 hours  polyethylene glycol 3350 17 Gram(s) Oral daily  potassium chloride    Tablet ER 40 milliEquivalent(s) Oral every 4 hours  rosuvastatin 20 milliGRAM(s) Oral at bedtime  senna 2 Tablet(s) Oral at bedtime  valsartan 160 milliGRAM(s) Oral daily    MEDICATIONS  (PRN):  dextrose Oral Gel 15 Gram(s) Oral once PRN Blood Glucose LESS THAN 70 milliGRAM(s)/deciliter  hydrALAZINE Injectable 10 milliGRAM(s) IV Push every 1 hour PRN SBP>150  labetalol Injectable 10 milliGRAM(s) IV Push every 2 hours PRN Diastolic blood pressure > 150  oxyCODONE    IR 5 milliGRAM(s) Oral every 4 hours PRN Moderate Pain (4 - 6)  oxyCODONE    IR 10 milliGRAM(s) Oral every 4 hours PRN Severe Pain (7 - 10)      LABS:                        8.3    8.12  )-----------( 103      ( 2025 02:03 )             24.3        136  |  102  |  24[H]  ----------------------------<  134[H]  3.4[L]   |  28  |  1.0    Ca    8.1[L]      2025 02:03  Mg     2.1         TPro  5.5[L]  /  Alb  3.5  /  TBili  0.4  /  DBili  x   /  AST  57[H]  /  ALT  25  /  AlkPhos  42      PT/INR - ( 2025 01:50 )   PT: 17.90 sec;   INR: 1.50 ratio         PTT - ( 2025 01:50 )  PTT:31.6 sec LIVER FUNCTIONS - ( 2025 02:03 )  Alb: 3.5 g/dL / Pro: 5.5 g/dL / ALK PHOS: 42 U/L / ALT: 25 U/L / AST: 57 U/L / GGT: x           Urinalysis Basic - ( 2025 02:03 )    Color: x / Appearance: x / SG: x / pH: x  Gluc: 134 mg/dL / Ketone: x  / Bili: x / Urobili: x   Blood: x / Protein: x / Nitrite: x   Leuk Esterase: x / RBC: x / WBC x   Sq Epi: x / Non Sq Epi: x / Bacteria: x        RADIOLOGY:  X Reviewed and interpreted by me: bibasilar opacities    CARDIOPULMONARY DYSFUNCTION  - Respiratory status required supplemental oxygen & the following of continuous pulse oximetry for support & to prevent decompensation  - Continued early mobilization as tolerated  - Addressed analgesic regimen to optimize function    PREVENTION-PROPHYLAXIS  - ASA continued for graft occlusion-thromboembolism prophylaxis  - Statin was also started for long term graft patency  - Heparin continued for VTE prophylaxis in addition to Venodyne boots  - Pepcid maintained for GI bleeding prophylaxis  - Lopressor continued for atrial fibrillation prophylaxis  - Metabolic stability & infection prophylaxis required review and adjustment of regular Insulin sliding scale and gylcemic regimen while following serial glucose levels to help achieve & maintain euglycemia  - Reviewed & addressed surgical site infection prophylaxis regimen

## 2025-02-22 NOTE — PROGRESS NOTE ADULT - SUBJECTIVE AND OBJECTIVE BOX
OPERATIVE PROCEDURE(s):   cabg x 4             POD # 3    SURGEON(s): charu      SUBJECTIVE ASSESSMENT: complains of incisional cp that responds to pain meds; pt went into a. fib overnight and converted to sr with amio and procan    Vital Signs Last 24 Hrs  T(C): 37.1 (22 Feb 2025 12:00), Max: 37.3 (21 Feb 2025 20:00)  T(F): 98.8 (22 Feb 2025 12:00), Max: 99.2 (21 Feb 2025 20:00)  HR: 80 (22 Feb 2025 13:00) (76 - 138)  BP: 95/56 (22 Feb 2025 12:00) (86/50 - 138/85)  BP(mean): 69 (22 Feb 2025 12:00) (64 - 107)  RR: 17 (22 Feb 2025 13:00) (12 - 27)  SpO2: 96% (22 Feb 2025 13:00) (91% - 97%)    Parameters below as of 22 Feb 2025 14:00  Patient On (Oxygen Delivery Method): nasal cannula, high flow  O2 Flow (L/min): 40  O2 Concentration (%): 50  02-21-25 @ 07:01  -  02-22-25 @ 07:00  --------------------------------------------------------  IN: 1306.2 mL / OUT: 3730 mL / NET: -2423.8 mL    02-22-25 @ 07:01  -  02-22-25 @ 15:55  --------------------------------------------------------  IN: 233.1 mL / OUT: 560 mL / NET: -326.9 mL      Physical Exam:  General: NAD; A&Ox3  Cardiac: S1/S2, RRR, no rubs  Lungs: unlabored respirations, CTA b/l, no wheeze, no rales, no crackles  Abdomen: Soft/NT/ND  Sternum: Intact, no click, dressing with minimal dried blood, intact. No erythema, swelling, active discharge.  R leg wrapped in ace bandage  Extremities: Mild nonpitting edema B/L UE and LE; no cyanosis. Strength 5/5 of distal LE B/L.    LABS:                        8.3    8.12  )-----------( 103      ( 22 Feb 2025 02:03 )             24.3     COUMADIN:   [ ] YES [ x] NO    PT/INR - ( 21 Feb 2025 01:50 )   PT: 17.90 sec;   INR: 1.50 ratio         PTT - ( 21 Feb 2025 01:50 )  PTT:31.6 sec  02-22    136  |  102  |  24[H]  ----------------------------<  134[H]  3.4[L]   |  28  |  1.0    Ca    8.1[L]      22 Feb 2025 02:03  Mg     2.1     02-22    TPro  5.5[L]  /  Alb  3.5  /  TBili  0.4  /  DBili  x   /  AST  57[H]  /  ALT  25  /  AlkPhos  42  02-22    Urinalysis Basic - ( 22 Feb 2025 02:03 )    Color: x / Appearance: x / SG: x / pH: x  Gluc: 134 mg/dL / Ketone: x  / Bili: x / Urobili: x   Blood: x / Protein: x / Nitrite: x   Leuk Esterase: x / RBC: x / WBC x   Sq Epi: x / Non Sq Epi: x / Bacteria: x    MEDICATIONS  (STANDING):  albuterol/ipratropium for Nebulization 3 milliLiter(s) Nebulizer every 6 hours  aMIOdarone Infusion 1 mG/Min (33.3 mL/Hr) IV Continuous <Continuous>  amLODIPine   Tablet 5 milliGRAM(s) Oral daily  ascorbic acid 500 milliGRAM(s) Oral daily  aspirin enteric coated 81 milliGRAM(s) Oral daily  bisacodyl 5 milliGRAM(s) Oral every 12 hours  bisacodyl Suppository 10 milliGRAM(s) Rectal once  chlorhexidine 2% Cloths 1 Application(s) Topical daily  clopidogrel Tablet 75 milliGRAM(s) Oral daily  dextrose 5%. 1000 milliLiter(s) (100 mL/Hr) IV Continuous <Continuous>  dextrose 5%. 1000 milliLiter(s) (50 mL/Hr) IV Continuous <Continuous>  dextrose 50% Injectable 50 milliLiter(s) IV Push every 15 minutes  dextrose 50% Injectable 25 milliLiter(s) IV Push every 15 minutes  famotidine    Tablet 20 milliGRAM(s) Oral every 12 hours  fluticasone propionate/ salmeterol 100-50 MICROgram(s) Diskus 1 Dose(s) Inhalation two times a day  folic acid 1 milliGRAM(s) Oral daily  furosemide   Injectable 40 milliGRAM(s) IV Push daily  glucagon  Injectable 1 milliGRAM(s) IntraMuscular once  heparin   Injectable 5000 Unit(s) SubCutaneous every 12 hours  insulin lispro (ADMELOG) corrective regimen sliding scale   SubCutaneous three times a day before meals  insulin lispro (ADMELOG) corrective regimen sliding scale   SubCutaneous at bedtime  iron sucrose IVPB 200 milliGRAM(s) IV Intermittent every 24 hours  lidocaine   4% Patch 1 Patch Transdermal daily  metolazone 5 milliGRAM(s) Oral daily  metoprolol tartrate 25 milliGRAM(s) Oral every 8 hours  polyethylene glycol 3350 17 Gram(s) Oral daily  rosuvastatin 20 milliGRAM(s) Oral at bedtime  senna 2 Tablet(s) Oral at bedtime  valsartan 160 milliGRAM(s) Oral daily    MEDICATIONS  (PRN):  dextrose Oral Gel 15 Gram(s) Oral once PRN Blood Glucose LESS THAN 70 milliGRAM(s)/deciliter  hydrALAZINE Injectable 10 milliGRAM(s) IV Push every 1 hour PRN SBP>150  labetalol Injectable 10 milliGRAM(s) IV Push every 2 hours PRN Diastolic blood pressure > 150  oxyCODONE    IR 10 milliGRAM(s) Oral every 4 hours PRN Severe Pain (7 - 10)  oxyCODONE    IR 5 milliGRAM(s) Oral every 4 hours PRN Moderate Pain (4 - 6)      Allergies    No Known Allergies    Intolerances        Ambulation/Activity Status:  amb with pt      RADIOLOGY & ADDITIONAL TESTS:  Xray Chest 1 View-PORTABLE IMMEDIATE: IMMEDIATE   Indication: Shortness of Breath  Transport: Portable,  w/ Monitor  Provider's Contact #: [phone] (02-22-25 @ 15:54)  Xray Chest 1 View- PORTABLE-Routine: AM   Indication: Shortness of Breath  Transport: Portable,  w/ Monitor  Provider's Contact #: [phone] (02-22-25 @ 10:08)  ACC: [id number] EXAM:  XR CHEST PORTABLE ROUTINE 1V   ORDERED BY: LINH MONTALVO     PROCEDURE DATE:  02/22/2025          INTERPRETATION:  CLINICAL HISTORY: Postop    COMPARISON: February 21, 2025.    TECHNIQUE: Portable frontal chest radiograph.    FINDINGS:    Support devices: Satisfactory positioning.    Cardiac/mediastinum/hilum: Cardiomegaly, thoracic aortic calcification,   status post median sternotomy, CABG..    Lung parenchyma/Pleura: Bibasilar opacities.    Skeleton/soft tissues: Stable bony structures..      IMPRESSION:    Redemonstration bibasilar opacities and cardiomegaly.    --- End of Report ---    Assessment/Plan:  59y Male POD#3 s/p CABGx4  - Case and plan discussed with CTU Intensivist and CT Surgeon - Dr. Martin  - Continue CTU supportive care and ongoing plan of care as per continuing CTU rounds.   - Continue DVT/GI prophylaxis  - Incentive Spirometry 10 times an hour  - Continue to advance physical activity as tolerated and continue PT/OT as directed  1. CAD s/p CABG: Continue ASA, and BB. Pain control with lidocaine patch, IV tylenol, oxy, dilaudid prn. D/c pleural chest tubes; resume statin   2. Anemia, 2/2 acute blood loss: Monitor H/H. Start venofer and folic acid.   3. HTN- cont Lopressor and Norvasc  4. Post-op A. Fib ppx: monitor electrolytes, converted to sr with amio and procan  5. COPD/Hypoxia: Continue high flow O2, duoneb q6hr, advair. Keep O2sat >92%. Encourage incentive spirometry.   dvt/gi ppx    diuresis with lasix OPERATIVE PROCEDURE(s):   cabg x 4             POD # 3    SURGEON(s): charu      SUBJECTIVE ASSESSMENT: complains of incisional cp that responds to pain meds; pt went into a. fib overnight and converted to sr with amio and procan    Vital Signs Last 24 Hrs  T(C): 37.1 (22 Feb 2025 12:00), Max: 37.3 (21 Feb 2025 20:00)  T(F): 98.8 (22 Feb 2025 12:00), Max: 99.2 (21 Feb 2025 20:00)  HR: 80 (22 Feb 2025 13:00) (76 - 138)  BP: 95/56 (22 Feb 2025 12:00) (86/50 - 138/85)  BP(mean): 69 (22 Feb 2025 12:00) (64 - 107)  RR: 17 (22 Feb 2025 13:00) (12 - 27)  SpO2: 96% (22 Feb 2025 13:00) (91% - 97%)    Parameters below as of 22 Feb 2025 14:00  Patient On (Oxygen Delivery Method): nasal cannula, high flow  O2 Flow (L/min): 40  O2 Concentration (%): 50  02-21-25 @ 07:01  -  02-22-25 @ 07:00  --------------------------------------------------------  IN: 1306.2 mL / OUT: 3730 mL / NET: -2423.8 mL    02-22-25 @ 07:01  -  02-22-25 @ 15:55  --------------------------------------------------------  IN: 233.1 mL / OUT: 560 mL / NET: -326.9 mL      Physical Exam:  General: NAD; A&Ox3  Cardiac: S1/S2, RRR, no rubs  Lungs: unlabored respirations, CTA b/l, no wheeze, no rales, no crackles  Abdomen: Soft/NT/ND  Chest- multiple skin tears on the chest wall from tape  Sternum: Intact, no click, dressing with minimal dried blood, intact. No erythema, swelling, active discharge.  R leg wrapped in ace bandage  Extremities: Mild nonpitting edema B/L UE and LE; no cyanosis. Strength 5/5 of distal LE B/L.    LABS:                        8.3    8.12  )-----------( 103      ( 22 Feb 2025 02:03 )             24.3     COUMADIN:   [ ] YES [ x] NO    PT/INR - ( 21 Feb 2025 01:50 )   PT: 17.90 sec;   INR: 1.50 ratio         PTT - ( 21 Feb 2025 01:50 )  PTT:31.6 sec  02-22    136  |  102  |  24[H]  ----------------------------<  134[H]  3.4[L]   |  28  |  1.0    Ca    8.1[L]      22 Feb 2025 02:03  Mg     2.1     02-22    TPro  5.5[L]  /  Alb  3.5  /  TBili  0.4  /  DBili  x   /  AST  57[H]  /  ALT  25  /  AlkPhos  42  02-22    Urinalysis Basic - ( 22 Feb 2025 02:03 )    Color: x / Appearance: x / SG: x / pH: x  Gluc: 134 mg/dL / Ketone: x  / Bili: x / Urobili: x   Blood: x / Protein: x / Nitrite: x   Leuk Esterase: x / RBC: x / WBC x   Sq Epi: x / Non Sq Epi: x / Bacteria: x    MEDICATIONS  (STANDING):  albuterol/ipratropium for Nebulization 3 milliLiter(s) Nebulizer every 6 hours  aMIOdarone Infusion 1 mG/Min (33.3 mL/Hr) IV Continuous <Continuous>  amLODIPine   Tablet 5 milliGRAM(s) Oral daily  ascorbic acid 500 milliGRAM(s) Oral daily  aspirin enteric coated 81 milliGRAM(s) Oral daily  bisacodyl 5 milliGRAM(s) Oral every 12 hours  bisacodyl Suppository 10 milliGRAM(s) Rectal once  chlorhexidine 2% Cloths 1 Application(s) Topical daily  clopidogrel Tablet 75 milliGRAM(s) Oral daily  dextrose 5%. 1000 milliLiter(s) (100 mL/Hr) IV Continuous <Continuous>  dextrose 5%. 1000 milliLiter(s) (50 mL/Hr) IV Continuous <Continuous>  dextrose 50% Injectable 50 milliLiter(s) IV Push every 15 minutes  dextrose 50% Injectable 25 milliLiter(s) IV Push every 15 minutes  famotidine    Tablet 20 milliGRAM(s) Oral every 12 hours  fluticasone propionate/ salmeterol 100-50 MICROgram(s) Diskus 1 Dose(s) Inhalation two times a day  folic acid 1 milliGRAM(s) Oral daily  furosemide   Injectable 40 milliGRAM(s) IV Push daily  glucagon  Injectable 1 milliGRAM(s) IntraMuscular once  heparin   Injectable 5000 Unit(s) SubCutaneous every 12 hours  insulin lispro (ADMELOG) corrective regimen sliding scale   SubCutaneous three times a day before meals  insulin lispro (ADMELOG) corrective regimen sliding scale   SubCutaneous at bedtime  iron sucrose IVPB 200 milliGRAM(s) IV Intermittent every 24 hours  lidocaine   4% Patch 1 Patch Transdermal daily  metolazone 5 milliGRAM(s) Oral daily  metoprolol tartrate 25 milliGRAM(s) Oral every 8 hours  polyethylene glycol 3350 17 Gram(s) Oral daily  rosuvastatin 20 milliGRAM(s) Oral at bedtime  senna 2 Tablet(s) Oral at bedtime  valsartan 160 milliGRAM(s) Oral daily    MEDICATIONS  (PRN):  dextrose Oral Gel 15 Gram(s) Oral once PRN Blood Glucose LESS THAN 70 milliGRAM(s)/deciliter  hydrALAZINE Injectable 10 milliGRAM(s) IV Push every 1 hour PRN SBP>150  labetalol Injectable 10 milliGRAM(s) IV Push every 2 hours PRN Diastolic blood pressure > 150  oxyCODONE    IR 10 milliGRAM(s) Oral every 4 hours PRN Severe Pain (7 - 10)  oxyCODONE    IR 5 milliGRAM(s) Oral every 4 hours PRN Moderate Pain (4 - 6)      Allergies    No Known Allergies    Intolerances        Ambulation/Activity Status:  amb with pt      RADIOLOGY & ADDITIONAL TESTS:  Xray Chest 1 View-PORTABLE IMMEDIATE: IMMEDIATE   Indication: Shortness of Breath  Transport: Portable,  w/ Monitor  Provider's Contact #: [phone] (02-22-25 @ 15:54)  Xray Chest 1 View- PORTABLE-Routine: AM   Indication: Shortness of Breath  Transport: Portable,  w/ Monitor  Provider's Contact #: [phone] (02-22-25 @ 10:08)  ACC: [id number] EXAM:  XR CHEST PORTABLE ROUTINE 1V   ORDERED BY: LINH MONTALVO     PROCEDURE DATE:  02/22/2025          INTERPRETATION:  CLINICAL HISTORY: Postop    COMPARISON: February 21, 2025.    TECHNIQUE: Portable frontal chest radiograph.    FINDINGS:    Support devices: Satisfactory positioning.    Cardiac/mediastinum/hilum: Cardiomegaly, thoracic aortic calcification,   status post median sternotomy, CABG..    Lung parenchyma/Pleura: Bibasilar opacities.    Skeleton/soft tissues: Stable bony structures..      IMPRESSION:    Redemonstration bibasilar opacities and cardiomegaly.    --- End of Report ---    Assessment/Plan:  59y Male POD#3 s/p CABGx4  - Case and plan discussed with CTU Intensivist and CT Surgeon - Dr. Martin  - Continue CTU supportive care and ongoing plan of care as per continuing CTU rounds.   - Continue DVT/GI prophylaxis  - Incentive Spirometry 10 times an hour  - Continue to advance physical activity as tolerated and continue PT/OT as directed  1. CAD s/p CABG: Continue ASA, and BB. Pain control with lidocaine patch, IV tylenol, oxy, dilaudid prn. D/c pleural chest tubes; resume statin   2. Anemia, 2/2 acute blood loss: Monitor H/H. Start venofer and folic acid.   3. HTN- cont Lopressor and Norvasc  4. Post-op A. Fib ppx: monitor electrolytes, converted to sr with amio and procan  5. COPD/Hypoxia: Continue high flow O2, duoneb q6hr, advair. Keep O2sat >92%. Encourage incentive spirometry.   dvt/gi ppx    diuresis with lasix

## 2025-02-22 NOTE — PROGRESS NOTE ADULT - ASSESSMENT
Assessment/Plan:  CAD-s/p CABG x 4-POD #3  1-BP control-metoprolol, amlodipine  2-serum glucose control-insulin sliding scale correction regimen  3-fluid overload-diuresis  4-acute blood loss anemia-stable, monitor Hb/Hct  0-jpsmivtlapphrfcq-sgwiwq, s/p transfusion plts, monitor daily  6-COPD/Hypoxemia-bronchodilator tx, pulmonary toilet, chest PT, incentive spirometry, high flow O2  6-vxjzqnqwrwsenup-alibgf, avoid nephrotoxins, monitor daily  8-A-Fib, now NSR-continue amiodarone    40 minutes of critical care services provided

## 2025-02-23 LAB
ALBUMIN SERPL ELPH-MCNC: 3.6 G/DL — SIGNIFICANT CHANGE UP (ref 3.5–5.2)
ALBUMIN SERPL ELPH-MCNC: 3.9 G/DL — SIGNIFICANT CHANGE UP (ref 3.5–5.2)
ALP SERPL-CCNC: 43 U/L — SIGNIFICANT CHANGE UP (ref 30–115)
ALP SERPL-CCNC: 54 U/L — SIGNIFICANT CHANGE UP (ref 30–115)
ALT FLD-CCNC: 26 U/L — SIGNIFICANT CHANGE UP (ref 0–41)
ALT FLD-CCNC: 38 U/L — SIGNIFICANT CHANGE UP (ref 0–41)
ANION GAP SERPL CALC-SCNC: 12 MMOL/L — SIGNIFICANT CHANGE UP (ref 7–14)
ANION GAP SERPL CALC-SCNC: 12 MMOL/L — SIGNIFICANT CHANGE UP (ref 7–14)
AST SERPL-CCNC: 50 U/L — HIGH (ref 0–41)
AST SERPL-CCNC: 64 U/L — HIGH (ref 0–41)
BASOPHILS # BLD AUTO: 0.02 K/UL — SIGNIFICANT CHANGE UP (ref 0–0.2)
BASOPHILS NFR BLD AUTO: 0.2 % — SIGNIFICANT CHANGE UP (ref 0–1)
BILIRUB SERPL-MCNC: 0.6 MG/DL — SIGNIFICANT CHANGE UP (ref 0.2–1.2)
BILIRUB SERPL-MCNC: 0.7 MG/DL — SIGNIFICANT CHANGE UP (ref 0.2–1.2)
BUN SERPL-MCNC: 26 MG/DL — HIGH (ref 10–20)
BUN SERPL-MCNC: 27 MG/DL — HIGH (ref 10–20)
CALCIUM SERPL-MCNC: 8.1 MG/DL — LOW (ref 8.4–10.5)
CALCIUM SERPL-MCNC: 8.2 MG/DL — LOW (ref 8.4–10.5)
CHLORIDE SERPL-SCNC: 100 MMOL/L — SIGNIFICANT CHANGE UP (ref 98–110)
CHLORIDE SERPL-SCNC: 99 MMOL/L — SIGNIFICANT CHANGE UP (ref 98–110)
CO2 SERPL-SCNC: 28 MMOL/L — SIGNIFICANT CHANGE UP (ref 17–32)
CO2 SERPL-SCNC: 28 MMOL/L — SIGNIFICANT CHANGE UP (ref 17–32)
CREAT SERPL-MCNC: 1 MG/DL — SIGNIFICANT CHANGE UP (ref 0.7–1.5)
CREAT SERPL-MCNC: 1.1 MG/DL — SIGNIFICANT CHANGE UP (ref 0.7–1.5)
EGFR: 76 ML/MIN/1.73M2 — SIGNIFICANT CHANGE UP
EGFR: 76 ML/MIN/1.73M2 — SIGNIFICANT CHANGE UP
EGFR: 86 ML/MIN/1.73M2 — SIGNIFICANT CHANGE UP
EGFR: 86 ML/MIN/1.73M2 — SIGNIFICANT CHANGE UP
EOSINOPHIL # BLD AUTO: 0.17 K/UL — SIGNIFICANT CHANGE UP (ref 0–0.7)
EOSINOPHIL NFR BLD AUTO: 2.1 % — SIGNIFICANT CHANGE UP (ref 0–8)
GAS PNL BLDA: SIGNIFICANT CHANGE UP
GLUCOSE BLDC GLUCOMTR-MCNC: 101 MG/DL — HIGH (ref 70–99)
GLUCOSE BLDC GLUCOMTR-MCNC: 121 MG/DL — HIGH (ref 70–99)
GLUCOSE BLDC GLUCOMTR-MCNC: 124 MG/DL — HIGH (ref 70–99)
GLUCOSE BLDC GLUCOMTR-MCNC: 135 MG/DL — HIGH (ref 70–99)
GLUCOSE SERPL-MCNC: 118 MG/DL — HIGH (ref 70–99)
GLUCOSE SERPL-MCNC: 126 MG/DL — HIGH (ref 70–99)
HCT VFR BLD CALC: 24.8 % — LOW (ref 42–52)
HGB BLD-MCNC: 8.3 G/DL — LOW (ref 14–18)
IMM GRANULOCYTES NFR BLD AUTO: 0.6 % — HIGH (ref 0.1–0.3)
LYMPHOCYTES # BLD AUTO: 0.84 K/UL — LOW (ref 1.2–3.4)
LYMPHOCYTES # BLD AUTO: 10.2 % — LOW (ref 20.5–51.1)
MAGNESIUM SERPL-MCNC: 2 MG/DL — SIGNIFICANT CHANGE UP (ref 1.8–2.4)
MCHC RBC-ENTMCNC: 29.2 PG — SIGNIFICANT CHANGE UP (ref 27–31)
MCHC RBC-ENTMCNC: 33.5 G/DL — SIGNIFICANT CHANGE UP (ref 32–37)
MCV RBC AUTO: 87.3 FL — SIGNIFICANT CHANGE UP (ref 80–94)
MONOCYTES # BLD AUTO: 0.78 K/UL — HIGH (ref 0.1–0.6)
MONOCYTES NFR BLD AUTO: 9.5 % — HIGH (ref 1.7–9.3)
NEUTROPHILS # BLD AUTO: 6.34 K/UL — SIGNIFICANT CHANGE UP (ref 1.4–6.5)
NEUTROPHILS NFR BLD AUTO: 77.4 % — HIGH (ref 42.2–75.2)
NRBC BLD AUTO-RTO: 0 /100 WBCS — SIGNIFICANT CHANGE UP (ref 0–0)
PLATELET # BLD AUTO: 134 K/UL — SIGNIFICANT CHANGE UP (ref 130–400)
PMV BLD: 10.5 FL — HIGH (ref 7.4–10.4)
POTASSIUM SERPL-MCNC: 3.5 MMOL/L — SIGNIFICANT CHANGE UP (ref 3.5–5)
POTASSIUM SERPL-MCNC: 3.5 MMOL/L — SIGNIFICANT CHANGE UP (ref 3.5–5)
POTASSIUM SERPL-SCNC: 3.5 MMOL/L — SIGNIFICANT CHANGE UP (ref 3.5–5)
POTASSIUM SERPL-SCNC: 3.5 MMOL/L — SIGNIFICANT CHANGE UP (ref 3.5–5)
PROT SERPL-MCNC: 5.7 G/DL — LOW (ref 6–8)
PROT SERPL-MCNC: 6.2 G/DL — SIGNIFICANT CHANGE UP (ref 6–8)
RBC # BLD: 2.84 M/UL — LOW (ref 4.7–6.1)
RBC # FLD: 13.3 % — SIGNIFICANT CHANGE UP (ref 11.5–14.5)
SODIUM SERPL-SCNC: 139 MMOL/L — SIGNIFICANT CHANGE UP (ref 135–146)
SODIUM SERPL-SCNC: 140 MMOL/L — SIGNIFICANT CHANGE UP (ref 135–146)
WBC # BLD: 8.2 K/UL — SIGNIFICANT CHANGE UP (ref 4.8–10.8)
WBC # FLD AUTO: 8.2 K/UL — SIGNIFICANT CHANGE UP (ref 4.8–10.8)

## 2025-02-23 PROCEDURE — 93010 ELECTROCARDIOGRAM REPORT: CPT

## 2025-02-23 PROCEDURE — 99291 CRITICAL CARE FIRST HOUR: CPT

## 2025-02-23 PROCEDURE — 71045 X-RAY EXAM CHEST 1 VIEW: CPT | Mod: 26

## 2025-02-23 RX ORDER — KETOROLAC TROMETHAMINE 30 MG/ML
15 INJECTION, SOLUTION INTRAMUSCULAR; INTRAVENOUS EVERY 6 HOURS
Refills: 0 | Status: DISCONTINUED | OUTPATIENT
Start: 2025-02-23 | End: 2025-02-25

## 2025-02-23 RX ORDER — AMIODARONE HYDROCHLORIDE 50 MG/ML
1 INJECTION, SOLUTION INTRAVENOUS
Qty: 450 | Refills: 0 | Status: DISCONTINUED | OUTPATIENT
Start: 2025-02-23 | End: 2025-02-25

## 2025-02-23 RX ORDER — AMIODARONE HYDROCHLORIDE 50 MG/ML
400 INJECTION, SOLUTION INTRAVENOUS EVERY 12 HOURS
Refills: 0 | Status: DISCONTINUED | OUTPATIENT
Start: 2025-02-23 | End: 2025-02-23

## 2025-02-23 RX ORDER — DIGOXIN 250 UG/1
500 TABLET ORAL ONCE
Refills: 0 | Status: COMPLETED | OUTPATIENT
Start: 2025-02-23 | End: 2025-02-23

## 2025-02-23 RX ORDER — DILTIAZEM HYDROCHLORIDE 240 MG/1
10 TABLET, EXTENDED RELEASE ORAL ONCE
Refills: 0 | Status: COMPLETED | OUTPATIENT
Start: 2025-02-23 | End: 2025-02-23

## 2025-02-23 RX ORDER — PROCAINAMIDE HCL 500 MG
1000 TABLET, EXTENDED RELEASE ORAL ONCE
Refills: 0 | Status: COMPLETED | OUTPATIENT
Start: 2025-02-23 | End: 2025-02-23

## 2025-02-23 RX ORDER — MAGNESIUM SULFATE 500 MG/ML
1 SYRINGE (ML) INJECTION ONCE
Refills: 0 | Status: COMPLETED | OUTPATIENT
Start: 2025-02-23 | End: 2025-02-23

## 2025-02-23 RX ADMIN — KETOROLAC TROMETHAMINE 15 MILLIGRAM(S): 30 INJECTION, SOLUTION INTRAMUSCULAR; INTRAVENOUS at 20:35

## 2025-02-23 RX ADMIN — Medication 1 DOSE(S): at 20:19

## 2025-02-23 RX ADMIN — Medication 40 MILLIEQUIVALENT(S): at 09:37

## 2025-02-23 RX ADMIN — IPRATROPIUM BROMIDE AND ALBUTEROL SULFATE 3 MILLILITER(S): .5; 2.5 SOLUTION RESPIRATORY (INHALATION) at 14:24

## 2025-02-23 RX ADMIN — Medication 40 MILLIEQUIVALENT(S): at 14:14

## 2025-02-23 RX ADMIN — Medication 2 TABLET(S): at 21:01

## 2025-02-23 RX ADMIN — Medication 100 GRAM(S): at 20:19

## 2025-02-23 RX ADMIN — Medication 1 APPLICATION(S): at 05:09

## 2025-02-23 RX ADMIN — KETOROLAC TROMETHAMINE 15 MILLIGRAM(S): 30 INJECTION, SOLUTION INTRAMUSCULAR; INTRAVENOUS at 20:20

## 2025-02-23 RX ADMIN — AMIODARONE HYDROCHLORIDE 33.3 MG/MIN: 50 INJECTION, SOLUTION INTRAVENOUS at 18:50

## 2025-02-23 RX ADMIN — CLOPIDOGREL BISULFATE 75 MILLIGRAM(S): 75 TABLET, FILM COATED ORAL at 14:08

## 2025-02-23 RX ADMIN — LIDOCAINE HYDROCHLORIDE 1 PATCH: 20 JELLY TOPICAL at 00:00

## 2025-02-23 RX ADMIN — Medication 1 APPLICATION(S): at 18:47

## 2025-02-23 RX ADMIN — AMIODARONE HYDROCHLORIDE 33.3 MG/MIN: 50 INJECTION, SOLUTION INTRAVENOUS at 18:28

## 2025-02-23 RX ADMIN — IRON SUCROSE 100 MILLIGRAM(S): 20 INJECTION, SOLUTION INTRAVENOUS at 11:49

## 2025-02-23 RX ADMIN — Medication 1 DOSE(S): at 09:26

## 2025-02-23 RX ADMIN — Medication 40 MILLIEQUIVALENT(S): at 21:53

## 2025-02-23 RX ADMIN — FUROSEMIDE 40 MILLIGRAM(S): 10 INJECTION INTRAMUSCULAR; INTRAVENOUS at 05:10

## 2025-02-23 RX ADMIN — Medication 500 MILLIGRAM(S): at 11:49

## 2025-02-23 RX ADMIN — Medication 5 MILLIGRAM(S): at 05:11

## 2025-02-23 RX ADMIN — METOPROLOL SUCCINATE 25 MILLIGRAM(S): 50 TABLET, EXTENDED RELEASE ORAL at 05:10

## 2025-02-23 RX ADMIN — DIGOXIN 500 MICROGRAM(S): 250 TABLET ORAL at 18:29

## 2025-02-23 RX ADMIN — FOLIC ACID 1 MILLIGRAM(S): 1 TABLET ORAL at 11:49

## 2025-02-23 RX ADMIN — Medication 40 MILLIEQUIVALENT(S): at 06:37

## 2025-02-23 RX ADMIN — Medication 20 MILLIGRAM(S): at 18:47

## 2025-02-23 RX ADMIN — Medication 1000 MILLIGRAM(S): at 10:50

## 2025-02-23 RX ADMIN — Medication 100 MILLIEQUIVALENT(S): at 20:18

## 2025-02-23 RX ADMIN — Medication 20 MILLIEQUIVALENT(S): at 11:49

## 2025-02-23 RX ADMIN — IPRATROPIUM BROMIDE AND ALBUTEROL SULFATE 3 MILLILITER(S): .5; 2.5 SOLUTION RESPIRATORY (INHALATION) at 02:18

## 2025-02-23 RX ADMIN — AMLODIPINE BESYLATE 5 MILLIGRAM(S): 10 TABLET ORAL at 05:10

## 2025-02-23 RX ADMIN — Medication 400 MILLIGRAM(S): at 09:50

## 2025-02-23 RX ADMIN — HEPARIN SODIUM 5000 UNIT(S): 1000 INJECTION INTRAVENOUS; SUBCUTANEOUS at 18:48

## 2025-02-23 RX ADMIN — Medication 160 MILLIGRAM(S): at 05:12

## 2025-02-23 RX ADMIN — HEPARIN SODIUM 5000 UNIT(S): 1000 INJECTION INTRAVENOUS; SUBCUTANEOUS at 05:10

## 2025-02-23 RX ADMIN — IPRATROPIUM BROMIDE AND ALBUTEROL SULFATE 3 MILLILITER(S): .5; 2.5 SOLUTION RESPIRATORY (INHALATION) at 09:15

## 2025-02-23 RX ADMIN — METOPROLOL SUCCINATE 25 MILLIGRAM(S): 50 TABLET, EXTENDED RELEASE ORAL at 14:14

## 2025-02-23 RX ADMIN — POLYETHYLENE GLYCOL 3350 17 GRAM(S): 17 POWDER, FOR SOLUTION ORAL at 11:49

## 2025-02-23 RX ADMIN — Medication 520 MILLIGRAM(S): at 18:05

## 2025-02-23 RX ADMIN — Medication 20 MILLIGRAM(S): at 05:10

## 2025-02-23 RX ADMIN — Medication 100 MILLIEQUIVALENT(S): at 21:52

## 2025-02-23 RX ADMIN — ROSUVASTATIN CALCIUM 20 MILLIGRAM(S): 20 TABLET, FILM COATED ORAL at 21:01

## 2025-02-23 RX ADMIN — DILTIAZEM HYDROCHLORIDE 10 MILLIGRAM(S): 240 TABLET, EXTENDED RELEASE ORAL at 19:09

## 2025-02-23 RX ADMIN — METOPROLOL SUCCINATE 25 MILLIGRAM(S): 50 TABLET, EXTENDED RELEASE ORAL at 21:01

## 2025-02-23 RX ADMIN — AMIODARONE HYDROCHLORIDE 400 MILLIGRAM(S): 50 INJECTION, SOLUTION INTRAVENOUS at 14:50

## 2025-02-23 RX ADMIN — Medication 81 MILLIGRAM(S): at 11:49

## 2025-02-23 RX ADMIN — AMIODARONE HYDROCHLORIDE 16.7 MG/MIN: 50 INJECTION, SOLUTION INTRAVENOUS at 14:04

## 2025-02-23 RX ADMIN — Medication 1 APPLICATION(S): at 12:44

## 2025-02-23 NOTE — PROGRESS NOTE ADULT - ASSESSMENT
Assessment/Plan:  CAD-s/p CABG x 4-POD #4  A fib placed in afternon on po amio and amio drip d/heidi ...he went back  to Afib RVR  amio drip back at 1 mg  1-BP control- beta-blockers  2-serum glucose control-insulin   3-fluid overload-gentle diuresis lasix 40 iv q12 and metalozon 5 po daily  4-acute blood loss anemia-stable, monitor Hb/Hct  2-vtksorrdfvpyydja-cbvukk, s/p transfusion plts, monitor daily  6-COPD/Hypoxemia-bronchodilator tx, pulmonary toilet, chest PT, incentive spirometry, high flow O2  1-oooocmhwwqvpjgi-zlkeqj, avoid nephrotoxins, monitor daily    high flow oxygen d/heidi on NC o2     d/c chest tubes    ambulate

## 2025-02-23 NOTE — PROGRESS NOTE ADULT - SUBJECTIVE AND OBJECTIVE BOX
OPERATIVE PROCEDURE(s):                POD #                       59yMale  SURGEON(s): YUDELKA Martin  SUBJECTIVE ASSESSMENT:   Vital Signs Last 24 Hrs  T(F): 98.9 (23 Feb 2025 04:00), Max: 99.1 (22 Feb 2025 20:00)  HR: 92 (23 Feb 2025 10:00) (77 - 97)  BP: 124/61 (23 Feb 2025 07:00) (95/52 - 134/70)  BP(mean): 88 (23 Feb 2025 07:00) (68 - 96)  ABP: 137/56 (23 Feb 2025 10:00) (100/45 - 140/64)  ABP(mean): 79 (23 Feb 2025 10:00)  RR: 24 (23 Feb 2025 10:00) (16 - 32)  SpO2: 93% (23 Feb 2025 10:00) (93% - 98%)      I&O's Detail    22 Feb 2025 07:01  -  23 Feb 2025 07:00  --------------------------------------------------------  IN:    Amiodarone: 482.9 mL    Amiodarone: 150.3 mL    IV PiggyBack: 200 mL    Oral Fluid: 240 mL  Total IN: 1073.2 mL    OUT:    Chest Tube (mL): 20 mL    Indwelling Catheter - Urethral (mL): 3120 mL  Total OUT: 3140 mL        Net:   I&O's Detail    21 Feb 2025 07:01  -  22 Feb 2025 07:00  --------------------------------------------------------  Total NET: -2423.8 mL      22 Feb 2025 07:01  -  23 Feb 2025 07:00  --------------------------------------------------------  Total NET: -2066.8 mL        CAPILLARY BLOOD GLUCOSE      POCT Blood Glucose.: 135 mg/dL (23 Feb 2025 06:36)  POCT Blood Glucose.: 119 mg/dL (22 Feb 2025 20:39)  POCT Blood Glucose.: 138 mg/dL (22 Feb 2025 17:17)  POCT Blood Glucose.: 129 mg/dL (22 Feb 2025 12:48)    Physical Exam:  General: NAD; A&Ox3/Patient is intubated and sedated  Cardiac: S1/S2, RRR, no murmur, no rubs  Lungs: unlabored respirations, CTA b/l, no wheeze, no rales, no crackles  Abdomen: Soft/NT/ND; positive bowel sounds x 4  Sternum: Intact, no click, incision healing well with no drainage  Incisions: Incisions clean/dry/intact  Extremities: No edema b/l lower extremities; good capillary refill; no cyanosis; palpable 1+ pedal pulses b/l    Central Venous Catheter: Yes[]  No[] , If Yes indication:           Day #  Mckeon Catheter: Yes  [] , No  [] , If yes indication:                      Day #  NGT: Yes [] No [] ,    If Yes Placement:                                     Day #  EPICARDIAL WIRES:  [] YES [] NO                                              Day #  BOWEL MOVEMENT:  [] YES [] NO, If No, Timing since last BM:      Day #  CHEST TUBE(Left/Right):  [] YES [] NO, If yes -  AIR LEAKS:  [] YES [] NO        LABS:                        8.3[L]  8.20  )-----------( 134      ( 23 Feb 2025 02:00 )             24.8[L]                        8.3[L]  8.12  )-----------( 103[L]    ( 22 Feb 2025 02:03 )             24.3[L]    02-23    140  |  100  |  27[H]  ----------------------------<  126[H]  3.5   |  28  |  1.0  02-22    136  |  99  |  27[H]  ----------------------------<  117[H]  3.5   |  28  |  1.2    Ca    8.2[L]      23 Feb 2025 02:00  Mg     2.0     02-23    TPro  5.7[L] [6.0 - 8.0]  /  Alb  3.6 [3.5 - 5.2]  /  TBili  0.6 [0.2 - 1.2]  /  DBili  x   /  AST  50[H] [0 - 41]  /  ALT  26 [0 - 41]  /  AlkPhos  43 [30 - 115]  02-23      Urinalysis Basic - ( 23 Feb 2025 02:00 )    Color: x / Appearance: x / SG: x / pH: x  Gluc: 126 mg/dL / Ketone: x  / Bili: x / Urobili: x   Blood: x / Protein: x / Nitrite: x   Leuk Esterase: x / RBC: x / WBC x   Sq Epi: x / Non Sq Epi: x / Bacteria: x      ABG - ( 23 Feb 2025 03:44 )  pH: 7.53  /  pCO2: 36    /  pO2: 83    / HCO3: 30    / Base Excess: 7.0   /  SaO2: 98.3  /  LA: 1.1              RADIOLOGY & ADDITIONAL TESTS:  CXR:  EKG:  MEDICATIONS  (STANDING):  albuterol/ipratropium for Nebulization 3 milliLiter(s) Nebulizer every 6 hours  aMIOdarone Infusion 1 mG/Min (33.3 mL/Hr) IV Continuous <Continuous>  aMIOdarone Infusion 0.5 mG/Min (16.7 mL/Hr) IV Continuous <Continuous>  amLODIPine   Tablet 5 milliGRAM(s) Oral daily  ascorbic acid 500 milliGRAM(s) Oral daily  aspirin enteric coated 81 milliGRAM(s) Oral daily  bacitracin   Ointment 1 Application(s) Topical every 12 hours  bisacodyl 5 milliGRAM(s) Oral every 12 hours  bisacodyl Suppository 10 milliGRAM(s) Rectal once  chlorhexidine 2% Cloths 1 Application(s) Topical daily  clopidogrel Tablet 75 milliGRAM(s) Oral daily  dextrose 5%. 1000 milliLiter(s) (50 mL/Hr) IV Continuous <Continuous>  dextrose 5%. 1000 milliLiter(s) (100 mL/Hr) IV Continuous <Continuous>  dextrose 50% Injectable 50 milliLiter(s) IV Push every 15 minutes  dextrose 50% Injectable 25 milliLiter(s) IV Push every 15 minutes  famotidine    Tablet 20 milliGRAM(s) Oral every 12 hours  fluticasone propionate/ salmeterol 100-50 MICROgram(s) Diskus 1 Dose(s) Inhalation two times a day  folic acid 1 milliGRAM(s) Oral daily  furosemide   Injectable 40 milliGRAM(s) IV Push daily  glucagon  Injectable 1 milliGRAM(s) IntraMuscular once  heparin   Injectable 5000 Unit(s) SubCutaneous every 12 hours  insulin lispro (ADMELOG) corrective regimen sliding scale   SubCutaneous three times a day before meals  insulin lispro (ADMELOG) corrective regimen sliding scale   SubCutaneous at bedtime  iron sucrose IVPB 200 milliGRAM(s) IV Intermittent every 24 hours  lidocaine   4% Patch 1 Patch Transdermal daily  metolazone 5 milliGRAM(s) Oral daily  metoprolol tartrate 25 milliGRAM(s) Oral every 8 hours  polyethylene glycol 3350 17 Gram(s) Oral daily  potassium chloride    Tablet ER 20 milliEquivalent(s) Oral daily  potassium chloride    Tablet ER 40 milliEquivalent(s) Oral every 4 hours  rosuvastatin 20 milliGRAM(s) Oral at bedtime  senna 2 Tablet(s) Oral at bedtime  valsartan 160 milliGRAM(s) Oral daily    MEDICATIONS  (PRN):  dextrose Oral Gel 15 Gram(s) Oral once PRN Blood Glucose LESS THAN 70 milliGRAM(s)/deciliter  hydrALAZINE Injectable 10 milliGRAM(s) IV Push every 1 hour PRN SBP>150  labetalol Injectable 10 milliGRAM(s) IV Push every 2 hours PRN Diastolic blood pressure > 150    HEPARIN:  [] YES [] NO  Dose: XX UNITS/HR UNITS Q8H  LOVENOX:[] YES [] NO  Dose: XX mg Q24H  COUMADIN: []  YES [] NO  Dose: XX mg  Q24H  SCD's: YES b/l  GI Prophylaxis: Protonix [], Pepcid [], None [], (Contra-indication:.....)    Post-Op Beta-Blockers: Yes [], No[], If No, then contraindication:  Post-Op Aspirin: Yes [],  No [], If No, then contraindication:  Post-Op Statin: Yes [], No[], If No, then contraindication:  Allergies    No Known Allergies    Intolerances      Ambulation/Activity Status:    Assessment/Plan:  59y Male status-post .....  - Case and plan discussed with CTU Intensivist and CT Surgeon - Dr. Martin/Sole/Monik  - Continue CTU supportive care    - Continue DVT/GI prophylaxis  - Incentive Spirometry 10 times an hour  - Continue to advance physical activity as tolerated and continue PT/OT as directed  1. CAD: Continue ASA, statin, BB  2. HTN:   3. A. Fib:   4. COPD/Hypoxia:   5. DM/Glucose Control:     Social Service Disposition:     OPERATIVE PROCEDURE(s):        cabg x 4             POD #    4                   59yMale  SURGEON(s): YUDELKA Martin  SUBJECTIVE ASSESSMENT: pt seen and examined. no acute complaints   Vital Signs Last 24 Hrs  T(F): 98.9 (23 Feb 2025 04:00), Max: 99.1 (22 Feb 2025 20:00)  HR: 92 (23 Feb 2025 10:00) (77 - 97)  BP: 124/61 (23 Feb 2025 07:00) (95/52 - 134/70)  BP(mean): 88 (23 Feb 2025 07:00) (68 - 96)  ABP: 137/56 (23 Feb 2025 10:00) (100/45 - 140/64)  ABP(mean): 79 (23 Feb 2025 10:00)  RR: 24 (23 Feb 2025 10:00) (16 - 32)  SpO2: 93% (23 Feb 2025 10:00) (93% - 98%)      I&O's Detail    22 Feb 2025 07:01  -  23 Feb 2025 07:00  --------------------------------------------------------  IN:    Amiodarone: 482.9 mL    Amiodarone: 150.3 mL    IV PiggyBack: 200 mL    Oral Fluid: 240 mL  Total IN: 1073.2 mL    OUT:    Chest Tube (mL): 20 mL    Indwelling Catheter - Urethral (mL): 3120 mL  Total OUT: 3140 mL        Net:   I&O's Detail    21 Feb 2025 07:01  -  22 Feb 2025 07:00  --------------------------------------------------------  Total NET: -2423.8 mL      22 Feb 2025 07:01  -  23 Feb 2025 07:00  --------------------------------------------------------  Total NET: -2066.8 mL        CAPILLARY BLOOD GLUCOSE      POCT Blood Glucose.: 135 mg/dL (23 Feb 2025 06:36)  POCT Blood Glucose.: 119 mg/dL (22 Feb 2025 20:39)  POCT Blood Glucose.: 138 mg/dL (22 Feb 2025 17:17)  POCT Blood Glucose.: 129 mg/dL (22 Feb 2025 12:48)    Physical Exam:  General: NAD; A&Ox3, no focal deficits, clear speech   Cardiac: S1/S2, RRR, no rubs  Lungs: unlabored respirations, CTA b/l, no wheeze, no rales, no crackles  Abdomen: Soft/NT/ND  Chest- multiple skin tears on the chest wall from tape  Sternum: Intact, no click, dressing with minimal dried blood, intact. No erythema, swelling, active discharge.  R leg wrapped in ace bandage  Extremities: Mild nonpitting edema B/L UE and LE; no cyanosis. Strength 5/5 of distal LE B/L.      Central Venous Catheter: Yes[x]  No[] , If Yes indication:   critical        Day # 4  Mckeon Catheter: Yes  [] , No  [] , If yes indication:        strict i.o              Day #4  EPICARDIAL WIRES:  [x] YES [] NO                                              Day # 4  BOWEL MOVEMENT:  [] YES [x] NO, If No, Timing since last BM:      Day #        LABS:                        8.3[L]  8.20  )-----------( 134      ( 23 Feb 2025 02:00 )             24.8[L]                        8.3[L]  8.12  )-----------( 103[L]    ( 22 Feb 2025 02:03 )             24.3[L]    02-23    140  |  100  |  27[H]  ----------------------------<  126[H]  3.5   |  28  |  1.0  02-22    136  |  99  |  27[H]  ----------------------------<  117[H]  3.5   |  28  |  1.2    Ca    8.2[L]      23 Feb 2025 02:00  Mg     2.0     02-23    TPro  5.7[L] [6.0 - 8.0]  /  Alb  3.6 [3.5 - 5.2]  /  TBili  0.6 [0.2 - 1.2]  /  DBili  x   /  AST  50[H] [0 - 41]  /  ALT  26 [0 - 41]  /  AlkPhos  43 [30 - 115]  02-23      Urinalysis Basic - ( 23 Feb 2025 02:00 )    Color: x / Appearance: x / SG: x / pH: x  Gluc: 126 mg/dL / Ketone: x  / Bili: x / Urobili: x   Blood: x / Protein: x / Nitrite: x   Leuk Esterase: x / RBC: x / WBC x   Sq Epi: x / Non Sq Epi: x / Bacteria: x      ABG - ( 23 Feb 2025 03:44 )  pH: 7.53  /  pCO2: 36    /  pO2: 83    / HCO3: 30    / Base Excess: 7.0   /  SaO2: 98.3  /  LA: 1.1              RADIOLOGY & ADDITIONAL TESTS:  CXR: < from: Xray Chest 1 View- PORTABLE-Routine (Xray Chest 1 View- PORTABLE-Routine in AM.) (02.23.25 @ 06:12) >    IMPRESSION:    Basilar opacities/effusions.    < end of copied text >    EKG: e< from: 12 Lead ECG (02.22.25 @ 07:42) >    Ventricular Rate 78 BPM    Atrial Rate 78 BPM    P-R Interval 160 ms    QRS Duration 96 ms    Q-T Interval 412 ms    QTC Calculation(Bazett) 469 ms    P Axis 48 degrees    R Axis 4 degrees    T Axis 40 degrees    Diagnosis Line Normal sinus rhythm  Possible Inferior infarct , age undetermined  Abnormal ECG    < end of copied text >    MEDICATIONS  (STANDING):  albuterol/ipratropium for Nebulization 3 milliLiter(s) Nebulizer every 6 hours  aMIOdarone Infusion 1 mG/Min (33.3 mL/Hr) IV Continuous <Continuous>  aMIOdarone Infusion 0.5 mG/Min (16.7 mL/Hr) IV Continuous <Continuous>  amLODIPine   Tablet 5 milliGRAM(s) Oral daily  ascorbic acid 500 milliGRAM(s) Oral daily  aspirin enteric coated 81 milliGRAM(s) Oral daily  bacitracin   Ointment 1 Application(s) Topical every 12 hours  bisacodyl 5 milliGRAM(s) Oral every 12 hours  bisacodyl Suppository 10 milliGRAM(s) Rectal once  chlorhexidine 2% Cloths 1 Application(s) Topical daily  clopidogrel Tablet 75 milliGRAM(s) Oral daily  dextrose 5%. 1000 milliLiter(s) (50 mL/Hr) IV Continuous <Continuous>  dextrose 5%. 1000 milliLiter(s) (100 mL/Hr) IV Continuous <Continuous>  dextrose 50% Injectable 50 milliLiter(s) IV Push every 15 minutes  dextrose 50% Injectable 25 milliLiter(s) IV Push every 15 minutes  famotidine    Tablet 20 milliGRAM(s) Oral every 12 hours  fluticasone propionate/ salmeterol 100-50 MICROgram(s) Diskus 1 Dose(s) Inhalation two times a day  folic acid 1 milliGRAM(s) Oral daily  furosemide   Injectable 40 milliGRAM(s) IV Push daily  glucagon  Injectable 1 milliGRAM(s) IntraMuscular once  heparin   Injectable 5000 Unit(s) SubCutaneous every 12 hours  insulin lispro (ADMELOG) corrective regimen sliding scale   SubCutaneous three times a day before meals  insulin lispro (ADMELOG) corrective regimen sliding scale   SubCutaneous at bedtime  iron sucrose IVPB 200 milliGRAM(s) IV Intermittent every 24 hours  lidocaine   4% Patch 1 Patch Transdermal daily  metolazone 5 milliGRAM(s) Oral daily  metoprolol tartrate 25 milliGRAM(s) Oral every 8 hours  polyethylene glycol 3350 17 Gram(s) Oral daily  potassium chloride    Tablet ER 20 milliEquivalent(s) Oral daily  potassium chloride    Tablet ER 40 milliEquivalent(s) Oral every 4 hours  rosuvastatin 20 milliGRAM(s) Oral at bedtime  senna 2 Tablet(s) Oral at bedtime  valsartan 160 milliGRAM(s) Oral daily    MEDICATIONS  (PRN):  dextrose Oral Gel 15 Gram(s) Oral once PRN Blood Glucose LESS THAN 70 milliGRAM(s)/deciliter  hydrALAZINE Injectable 10 milliGRAM(s) IV Push every 1 hour PRN SBP>150  labetalol Injectable 10 milliGRAM(s) IV Push every 2 hours PRN Diastolic blood pressure > 150    HEPARIN:  [x] YES [] NO  Dose: 5000 UNITS Q12H    SCD's: YES b/l  GI Prophylaxis: Protonix [], Pepcid [x], None [], (Contra-indication:.....)    Post-Op Beta-Blockers: Yes [x], No[], If No, then contraindication:  Post-Op Aspirin: Yes [x],  No [], If No, then contraindication:  Post-Op Statin: Yes [x], No[], If No, then contraindication:  Allergies    No Known Allergies    Intolerances      Ambulation/Activity Status: ambulate     Assessment/Plan:  59y Male POD#4 s/p CABGx4   - Case and plan discussed with CTU Intensivist and CT Surgeon - Dr. Martin  - Continue CTU supportive care and ongoing plan of care as per continuing CTU rounds.   - Continue DVT/GI prophylaxis  - Incentive Spirometry 10 times an hour  - Continue to advance physical activity as tolerated and continue PT/OT as directed  1. CAD s/p CABG: Continue ASA, statin, and BB. Pain control prn  2. Anemia, 2/2 acute blood loss: Monitor H/H. Start venofer and folic acid.   3. HTN- cont Lopressor and Norvasc  4. Post-op A. Fib ppx: monitor electrolytes, converted to sr with amio and procan  5. COPD/Hypoxia: Continue high flow O2, duoneb q6hr, advair. Keep O2sat >92%. Encourage incentive spirometry. lasix for noncardiogenic fluid overload   dvt/gi ppx    Social Service Disposition:  home

## 2025-02-23 NOTE — PROGRESS NOTE ADULT - SUBJECTIVE AND OBJECTIVE BOX
CTU Attending Progress Daily Note     23 Feb 2025 19:08  POD# - 4  CABG x4  He has history of Hypertension    Gallstones    History of abdominal aortic aneurysm (AAA)    Obesity    CAD (coronary artery disease)    History of hepatitis C      Interval event for past 24 hr:  KATIE PATRICK  59y had no event.   Current Complains:  KATIE PATRICK has no new complains  HPI:    OBJECTIVE:  ICU Vital Signs Last 24 Hrs  T(C): 37.2 (23 Feb 2025 12:00), Max: 37.3 (22 Feb 2025 20:00)  T(F): 98.9 (23 Feb 2025 12:00), Max: 99.1 (22 Feb 2025 20:00)  HR: 130 (23 Feb 2025 19:00) (77 - 148)  BP: 112/68 (23 Feb 2025 19:00) (112/68 - 134/70)  BP(mean): 83 (23 Feb 2025 19:00) (82 - 102)  ABP: 119/65 (23 Feb 2025 19:00) (104/65 - 151/63)  ABP(mean): 79 (23 Feb 2025 19:00) (63 - 107)  RR: 21 (23 Feb 2025 19:00) (16 - 32)  SpO2: 94% (23 Feb 2025 19:00) (93% - 98%)    O2 Parameters below as of 23 Feb 2025 19:00  Patient On (Oxygen Delivery Method): nasal cannula  O2 Flow (L/min): 4        I&O's Summary    22 Feb 2025 07:01  -  23 Feb 2025 07:00  --------------------------------------------------------  IN: 1073.2 mL / OUT: 3140 mL / NET: -2066.8 mL    23 Feb 2025 07:01  -  23 Feb 2025 19:08  --------------------------------------------------------  IN: 733.4 mL / OUT: 2050 mL / NET: -1316.6 mL      I&O's Detail    22 Feb 2025 07:01  -  23 Feb 2025 07:00  --------------------------------------------------------  IN:    Amiodarone: 482.9 mL    Amiodarone: 150.3 mL    IV PiggyBack: 200 mL    Oral Fluid: 240 mL  Total IN: 1073.2 mL    OUT:    Chest Tube (mL): 20 mL    Indwelling Catheter - Urethral (mL): 3120 mL  Total OUT: 3140 mL    Total NET: -2066.8 mL      23 Feb 2025 07:01  -  23 Feb 2025 19:08  --------------------------------------------------------  IN:    Amiodarone: 66.8 mL    Amiodarone: 66.6 mL    IV PiggyBack: 200 mL    Oral Fluid: 400 mL  Total IN: 733.4 mL    OUT:    Indwelling Catheter - Urethral (mL): 1850 mL    Voided (mL): 200 mL  Total OUT: 2050 mL    Total NET: -1316.6 mL        Adult Advanced Hemodynamics Last 24 Hrs  CVP(mm Hg): --  CVP(cm H2O): --  CO: --  CI: --  PA: --  PA(mean): --  PCWP: --  SVR: --  SVRI: --  PVR: --  PVRI: --    CAPILLARY BLOOD GLUCOSE      POCT Blood Glucose.: 101 mg/dL (23 Feb 2025 17:14)  POCT Blood Glucose.: 124 mg/dL (23 Feb 2025 12:02)  POCT Blood Glucose.: 135 mg/dL (23 Feb 2025 06:36)  POCT Blood Glucose.: 119 mg/dL (22 Feb 2025 20:39)    LABS:  ABG - ( 23 Feb 2025 03:44 )  pH, Arterial: 7.53  pH, Blood: x     /  pCO2: 36    /  pO2: 83    / HCO3: 30    / Base Excess: 7.0   /  SaO2: 98.3                                    8.3    8.20  )-----------( 134      ( 23 Feb 2025 02:00 )             24.8     02-23    139  |  99  |  26[H]  ----------------------------<  118[H]  3.5   |  28  |  1.1    Ca    8.1[L]      23 Feb 2025 14:00  Mg     2.0     02-23    TPro  6.2  /  Alb  3.9  /  TBili  0.7  /  DBili  x   /  AST  64[H]  /  ALT  38  /  AlkPhos  54  02-23      Urinalysis Basic - ( 23 Feb 2025 14:00 )    Color: x / Appearance: x / SG: x / pH: x  Gluc: 118 mg/dL / Ketone: x  / Bili: x / Urobili: x   Blood: x / Protein: x / Nitrite: x   Leuk Esterase: x / RBC: x / WBC x   Sq Epi: x / Non Sq Epi: x / Bacteria: x        Home Medications:  amLODIPine 5 mg oral tablet: 1 tab(s) orally 2 times a day (19 Feb 2025 06:54)  aspirin 81 mg oral tablet: orally once a day (19 Feb 2025 06:54)  clopidogrel 75 mg oral tablet: 1 tab(s) orally once a day (19 Feb 2025 06:54)  MELOXICAM 15 MG TABLET:  (19 Feb 2025 06:54)  metoprolol succinate 50 mg oral capsule, extended release: 1 cap(s) orally once a day (at bedtime) (19 Feb 2025 06:54)  omeprazole 40 mg oral delayed release capsule: 1 cap(s) orally once a day (19 Feb 2025 06:54)  rosuvastatin 20 mg oral tablet: 1 tab(s) orally once a day (at bedtime) (19 Feb 2025 06:54)  valsartan 160 mg oral tablet: 1 tab(s) orally once a day (19 Feb 2025 06:54)    HOSPITAL MEDICATIONS:  MEDICATIONS  (STANDING):  albuterol/ipratropium for Nebulization 3 milliLiter(s) Nebulizer every 6 hours  aMIOdarone Infusion 1 mG/Min (33.3 mL/Hr) IV Continuous <Continuous>  amLODIPine   Tablet 5 milliGRAM(s) Oral daily  ascorbic acid 500 milliGRAM(s) Oral daily  aspirin enteric coated 81 milliGRAM(s) Oral daily  bacitracin   Ointment 1 Application(s) Topical every 12 hours  bisacodyl 5 milliGRAM(s) Oral every 12 hours  bisacodyl Suppository 10 milliGRAM(s) Rectal once  chlorhexidine 2% Cloths 1 Application(s) Topical daily  clopidogrel Tablet 75 milliGRAM(s) Oral daily  dextrose 5%. 1000 milliLiter(s) (50 mL/Hr) IV Continuous <Continuous>  dextrose 5%. 1000 milliLiter(s) (100 mL/Hr) IV Continuous <Continuous>  dextrose 50% Injectable 50 milliLiter(s) IV Push every 15 minutes  dextrose 50% Injectable 25 milliLiter(s) IV Push every 15 minutes  diltiazem Injectable 10 milliGRAM(s) IV Push once  famotidine    Tablet 20 milliGRAM(s) Oral every 12 hours  fluticasone propionate/ salmeterol 100-50 MICROgram(s) Diskus 1 Dose(s) Inhalation two times a day  folic acid 1 milliGRAM(s) Oral daily  furosemide   Injectable 40 milliGRAM(s) IV Push daily  glucagon  Injectable 1 milliGRAM(s) IntraMuscular once  heparin   Injectable 5000 Unit(s) SubCutaneous every 12 hours  insulin lispro (ADMELOG) corrective regimen sliding scale   SubCutaneous three times a day before meals  insulin lispro (ADMELOG) corrective regimen sliding scale   SubCutaneous at bedtime  iron sucrose IVPB 200 milliGRAM(s) IV Intermittent every 24 hours  lidocaine   4% Patch 1 Patch Transdermal daily  metolazone 5 milliGRAM(s) Oral daily  metoprolol tartrate 25 milliGRAM(s) Oral every 8 hours  polyethylene glycol 3350 17 Gram(s) Oral daily  potassium chloride    Tablet ER 20 milliEquivalent(s) Oral daily  rosuvastatin 20 milliGRAM(s) Oral at bedtime  senna 2 Tablet(s) Oral at bedtime  valsartan 160 milliGRAM(s) Oral daily    MEDICATIONS  (PRN):  dextrose Oral Gel 15 Gram(s) Oral once PRN Blood Glucose LESS THAN 70 milliGRAM(s)/deciliter  hydrALAZINE Injectable 10 milliGRAM(s) IV Push every 1 hour PRN SBP>150  ketorolac   Injectable 15 milliGRAM(s) IV Push every 6 hours PRN Severe Pain (7 - 10)  labetalol Injectable 10 milliGRAM(s) IV Push every 2 hours PRN Diastolic blood pressure > 150      REVIEW OF SYSTEMS:  CONSTITUTIONAL: [X] all negative; [ ] weakness, [ ] fevers, [ ] chills  EYES/ENT: [X] all negative; [ ] visual changes, [ ] vertigo, [ ] throat pain   NECK: [X] all negative; [ ] pain, [ ] stiffness  RESPIRATORY: [] all negative, [ ] cough, [ ] wheezing, [ ] hemoptysis, [ ] shortness of breath  CARDIOVASCULAR: [] all negative; [ ] chest pain, [ ] palpitations, [ ] orthopnea  GASTROINTESTINAL: [X] all negative; [ ]abdominal pain, [ ] nausea, [ ] vomiting, [ ] hematemesis, [ ] diarrhea, [ ] constipation, [ ] melena, [ ] hematochezia.  GENITOURINARY: [X] all negative; [ ] dysuria, [ ] frequency, [ ] hematuria  NEUROLOGICAL: [X] all negative; [ ] numbness, [ ] weakness  SKIN: [X] all negative; [ ] itching, [ ] burning, [ ] rashes, [ ] lesions   All other review of systems is negative unless indicated above.    [  ] Unable to assess ROS because     PHYSICAL EXAM:          CONSTITUTIONAL: Well-developed; well-nourished; in no acute distress.   	SKIN: warm, dry  	HEAD: Normocephalic; atraumatic.  	EYES: PERRL, EOM, no conj injection, sclera clear  	ENT: No nasal discharge; airway clear.  	NECK: Supple; non tender.  No midline ttp ctls  	CARD: S1, S2 normal; no murmurs, gallops, or rubs. Regular rate and rhythm. 2+ RPs and DPs bilat, no carotid bruits, no pedal   edema, no calf pain b/l  	RESP: CTA  bilat good air movement No wheezes, rales or rhonchi.  	ABD: Soft, not tender, not distended, no CVA ttp no rebound or guarding, bowel sounds present  	EXT: Normal ROM.  No clubbing, cyanosis or edema.   	  	NEURO: Alert, awake, motor 5/5 R, 5/5 L        RADIOLOGY:  xray    I spent 45 minutes of critical care time ; more than 50% of visit was spent counseling and/or examining patient, reviewing vitals, labs, medications, imaging and discussing with the team goals of care to prevent life-threatening in this patient who is at high risk for deterioration or death due to:

## 2025-02-24 LAB
ALBUMIN SERPL ELPH-MCNC: 3.4 G/DL — LOW (ref 3.5–5.2)
ALP SERPL-CCNC: 46 U/L — SIGNIFICANT CHANGE UP (ref 30–115)
ALT FLD-CCNC: 41 U/L — SIGNIFICANT CHANGE UP (ref 0–41)
ANION GAP SERPL CALC-SCNC: 10 MMOL/L — SIGNIFICANT CHANGE UP (ref 7–14)
AST SERPL-CCNC: 62 U/L — HIGH (ref 0–41)
BASE EXCESS BLDA CALC-SCNC: 6.9 MMOL/L — HIGH (ref -2–3)
BASOPHILS # BLD AUTO: 0.03 K/UL — SIGNIFICANT CHANGE UP (ref 0–0.2)
BASOPHILS NFR BLD AUTO: 0.4 % — SIGNIFICANT CHANGE UP (ref 0–1)
BILIRUB SERPL-MCNC: 0.7 MG/DL — SIGNIFICANT CHANGE UP (ref 0.2–1.2)
BUN SERPL-MCNC: 28 MG/DL — HIGH (ref 10–20)
CALCIUM SERPL-MCNC: 7.7 MG/DL — LOW (ref 8.4–10.5)
CHLORIDE SERPL-SCNC: 97 MMOL/L — LOW (ref 98–110)
CO2 SERPL-SCNC: 27 MMOL/L — SIGNIFICANT CHANGE UP (ref 17–32)
CREAT SERPL-MCNC: 1 MG/DL — SIGNIFICANT CHANGE UP (ref 0.7–1.5)
EGFR: 86 ML/MIN/1.73M2 — SIGNIFICANT CHANGE UP
EGFR: 86 ML/MIN/1.73M2 — SIGNIFICANT CHANGE UP
EOSINOPHIL # BLD AUTO: 0.08 K/UL — SIGNIFICANT CHANGE UP (ref 0–0.7)
EOSINOPHIL NFR BLD AUTO: 1.1 % — SIGNIFICANT CHANGE UP (ref 0–8)
GLUCOSE BLDC GLUCOMTR-MCNC: 117 MG/DL — HIGH (ref 70–99)
GLUCOSE BLDC GLUCOMTR-MCNC: 117 MG/DL — HIGH (ref 70–99)
GLUCOSE BLDC GLUCOMTR-MCNC: 132 MG/DL — HIGH (ref 70–99)
GLUCOSE BLDC GLUCOMTR-MCNC: 141 MG/DL — HIGH (ref 70–99)
GLUCOSE SERPL-MCNC: 119 MG/DL — HIGH (ref 70–99)
HCO3 BLDA-SCNC: 30 MMOL/L — HIGH (ref 21–28)
HCT VFR BLD CALC: 26.3 % — LOW (ref 42–52)
HGB BLD-MCNC: 8.7 G/DL — LOW (ref 14–18)
IMM GRANULOCYTES NFR BLD AUTO: 1 % — HIGH (ref 0.1–0.3)
LYMPHOCYTES # BLD AUTO: 0.75 K/UL — LOW (ref 1.2–3.4)
LYMPHOCYTES # BLD AUTO: 10.2 % — LOW (ref 20.5–51.1)
MAGNESIUM SERPL-MCNC: 2.2 MG/DL — SIGNIFICANT CHANGE UP (ref 1.8–2.4)
MCHC RBC-ENTMCNC: 29.3 PG — SIGNIFICANT CHANGE UP (ref 27–31)
MCHC RBC-ENTMCNC: 33.1 G/DL — SIGNIFICANT CHANGE UP (ref 32–37)
MCV RBC AUTO: 88.6 FL — SIGNIFICANT CHANGE UP (ref 80–94)
MONOCYTES # BLD AUTO: 0.99 K/UL — HIGH (ref 0.1–0.6)
MONOCYTES NFR BLD AUTO: 13.5 % — HIGH (ref 1.7–9.3)
NEUTROPHILS # BLD AUTO: 5.43 K/UL — SIGNIFICANT CHANGE UP (ref 1.4–6.5)
NEUTROPHILS NFR BLD AUTO: 73.8 % — SIGNIFICANT CHANGE UP (ref 42.2–75.2)
NRBC BLD AUTO-RTO: 0 /100 WBCS — SIGNIFICANT CHANGE UP (ref 0–0)
PCO2 BLDA: 38 MMHG — SIGNIFICANT CHANGE UP (ref 35–48)
PH BLDA: 7.51 — HIGH (ref 7.35–7.45)
PLATELET # BLD AUTO: 144 K/UL — SIGNIFICANT CHANGE UP (ref 130–400)
PMV BLD: 10.1 FL — SIGNIFICANT CHANGE UP (ref 7.4–10.4)
PO2 BLDA: 73 MMHG — LOW (ref 83–108)
POTASSIUM SERPL-MCNC: 4.1 MMOL/L — SIGNIFICANT CHANGE UP (ref 3.5–5)
POTASSIUM SERPL-SCNC: 4.1 MMOL/L — SIGNIFICANT CHANGE UP (ref 3.5–5)
PROT SERPL-MCNC: 5.6 G/DL — LOW (ref 6–8)
RBC # BLD: 2.97 M/UL — LOW (ref 4.7–6.1)
RBC # FLD: 14.1 % — SIGNIFICANT CHANGE UP (ref 11.5–14.5)
SAO2 % BLDA: 98.9 % — HIGH (ref 94–98)
SODIUM SERPL-SCNC: 134 MMOL/L — LOW (ref 135–146)
WBC # BLD: 7.35 K/UL — SIGNIFICANT CHANGE UP (ref 4.8–10.8)
WBC # FLD AUTO: 7.35 K/UL — SIGNIFICANT CHANGE UP (ref 4.8–10.8)

## 2025-02-24 PROCEDURE — 71045 X-RAY EXAM CHEST 1 VIEW: CPT | Mod: 26

## 2025-02-24 PROCEDURE — 93010 ELECTROCARDIOGRAM REPORT: CPT

## 2025-02-24 PROCEDURE — 99232 SBSQ HOSP IP/OBS MODERATE 35: CPT | Mod: 24

## 2025-02-24 PROCEDURE — 99291 CRITICAL CARE FIRST HOUR: CPT

## 2025-02-24 RX ORDER — METOPROLOL SUCCINATE 50 MG/1
37.5 TABLET, EXTENDED RELEASE ORAL EVERY 8 HOURS
Refills: 0 | Status: DISCONTINUED | OUTPATIENT
Start: 2025-02-24 | End: 2025-02-25

## 2025-02-24 RX ORDER — ACETAMINOPHEN 500 MG/5ML
650 LIQUID (ML) ORAL EVERY 6 HOURS
Refills: 0 | Status: DISCONTINUED | OUTPATIENT
Start: 2025-02-24 | End: 2025-02-26

## 2025-02-24 RX ORDER — MELATONIN 5 MG
5 TABLET ORAL AT BEDTIME
Refills: 0 | Status: DISCONTINUED | OUTPATIENT
Start: 2025-02-24 | End: 2025-03-03

## 2025-02-24 RX ORDER — PROCAINAMIDE HCL 500 MG
1000 TABLET, EXTENDED RELEASE ORAL ONCE
Refills: 0 | Status: COMPLETED | OUTPATIENT
Start: 2025-02-24 | End: 2025-02-24

## 2025-02-24 RX ORDER — PROCAINAMIDE HCL 500 MG
1000 TABLET, EXTENDED RELEASE ORAL ONCE
Refills: 0 | Status: DISCONTINUED | OUTPATIENT
Start: 2025-02-24 | End: 2025-02-24

## 2025-02-24 RX ADMIN — Medication 5 MILLIGRAM(S): at 05:28

## 2025-02-24 RX ADMIN — Medication 2 TABLET(S): at 21:19

## 2025-02-24 RX ADMIN — ROSUVASTATIN CALCIUM 20 MILLIGRAM(S): 20 TABLET, FILM COATED ORAL at 21:20

## 2025-02-24 RX ADMIN — FUROSEMIDE 40 MILLIGRAM(S): 10 INJECTION INTRAMUSCULAR; INTRAVENOUS at 05:28

## 2025-02-24 RX ADMIN — METOPROLOL SUCCINATE 25 MILLIGRAM(S): 50 TABLET, EXTENDED RELEASE ORAL at 05:29

## 2025-02-24 RX ADMIN — Medication 40 MILLIEQUIVALENT(S): at 12:54

## 2025-02-24 RX ADMIN — IPRATROPIUM BROMIDE AND ALBUTEROL SULFATE 3 MILLILITER(S): .5; 2.5 SOLUTION RESPIRATORY (INHALATION) at 08:43

## 2025-02-24 RX ADMIN — FOLIC ACID 1 MILLIGRAM(S): 1 TABLET ORAL at 12:54

## 2025-02-24 RX ADMIN — Medication 1 DOSE(S): at 21:20

## 2025-02-24 RX ADMIN — Medication 5 MILLIGRAM(S): at 21:20

## 2025-02-24 RX ADMIN — POLYETHYLENE GLYCOL 3350 17 GRAM(S): 17 POWDER, FOR SOLUTION ORAL at 12:54

## 2025-02-24 RX ADMIN — Medication 81 MILLIGRAM(S): at 12:54

## 2025-02-24 RX ADMIN — METOPROLOL SUCCINATE 37.5 MILLIGRAM(S): 50 TABLET, EXTENDED RELEASE ORAL at 21:20

## 2025-02-24 RX ADMIN — Medication 650 MILLIGRAM(S): at 21:19

## 2025-02-24 RX ADMIN — Medication 1 APPLICATION(S): at 05:28

## 2025-02-24 RX ADMIN — AMLODIPINE BESYLATE 5 MILLIGRAM(S): 10 TABLET ORAL at 05:28

## 2025-02-24 RX ADMIN — Medication 260 MILLIGRAM(S): at 11:00

## 2025-02-24 RX ADMIN — Medication 1 APPLICATION(S): at 18:42

## 2025-02-24 RX ADMIN — Medication 500 MILLIGRAM(S): at 12:54

## 2025-02-24 RX ADMIN — AMIODARONE HYDROCHLORIDE 33.3 MG/MIN: 50 INJECTION, SOLUTION INTRAVENOUS at 01:29

## 2025-02-24 RX ADMIN — HEPARIN SODIUM 5000 UNIT(S): 1000 INJECTION INTRAVENOUS; SUBCUTANEOUS at 05:29

## 2025-02-24 RX ADMIN — Medication 20 MILLIGRAM(S): at 18:42

## 2025-02-24 RX ADMIN — AMIODARONE HYDROCHLORIDE 33.3 MG/MIN: 50 INJECTION, SOLUTION INTRAVENOUS at 08:55

## 2025-02-24 RX ADMIN — IRON SUCROSE 100 MILLIGRAM(S): 20 INJECTION, SOLUTION INTRAVENOUS at 12:51

## 2025-02-24 RX ADMIN — IPRATROPIUM BROMIDE AND ALBUTEROL SULFATE 3 MILLILITER(S): .5; 2.5 SOLUTION RESPIRATORY (INHALATION) at 14:01

## 2025-02-24 RX ADMIN — Medication 160 MILLIGRAM(S): at 05:28

## 2025-02-24 RX ADMIN — Medication 1 APPLICATION(S): at 12:54

## 2025-02-24 RX ADMIN — Medication 5 MILLIGRAM(S): at 18:41

## 2025-02-24 RX ADMIN — Medication 20 MILLIGRAM(S): at 05:28

## 2025-02-24 RX ADMIN — HEPARIN SODIUM 5000 UNIT(S): 1000 INJECTION INTRAVENOUS; SUBCUTANEOUS at 18:42

## 2025-02-24 RX ADMIN — METOPROLOL SUCCINATE 25 MILLIGRAM(S): 50 TABLET, EXTENDED RELEASE ORAL at 13:04

## 2025-02-24 RX ADMIN — Medication 650 MILLIGRAM(S): at 21:30

## 2025-02-24 RX ADMIN — CLOPIDOGREL BISULFATE 75 MILLIGRAM(S): 75 TABLET, FILM COATED ORAL at 12:53

## 2025-02-24 RX ADMIN — IPRATROPIUM BROMIDE AND ALBUTEROL SULFATE 3 MILLILITER(S): .5; 2.5 SOLUTION RESPIRATORY (INHALATION) at 19:40

## 2025-02-24 NOTE — DIETITIAN INITIAL EVALUATION ADULT - EDUCATION DIETARY MODIFICATIONS
Discussed importance of PO intake, current diet order and supplements./(2) meets goals/outcomes/verbalization

## 2025-02-24 NOTE — PROGRESS NOTE ADULT - ASSESSMENT
59M w/ htn, cad, AAA, gallstones, obesity s/p CABG    Assessment/Plan    Neuro:  #acute postoperative pain  - pain controlled  #at risk for postoperative/ICU delirium  - frequent re-orientation, good sleep hygiene, avoid medications which can increase delirium risk  - CAM-ICU qShift    CV: cad s/p cabg  - Continue statin, aspirin and beta blocker  - low dose calcium channel blocker for vasospasm prevention   #afib  - in sinus today. continue amio drip per cardiac surgery    Pulm:  #acute postoperative pulmonary insufficiency  #atelectasis  - aggressive incentive spirometry and chest PT to address atelectasis !!once extubated  #acute pulmonary edema  - continue diuresis    Renal/Fluid/Lytes:  #acute fluid overload  - continue diuresis  - replete/optimize electrolytes  - preload and after optimization    Heme:  #Acute blood loss anemia from surgery  - transfuse for hemoglobin < 7 or hemodynamic instability due to anemia    ID:  #at risk for surgical infection  - no signs of acute infection    Endocrine:   #acute postoperative stress hyperglycemia  - insulin therapy to achieve tight glucose control    GI:  #at risk for malnutrition  - enteral diet  #at risk for post-operative ileus and opioid-induced constipation  - bowel regimen    Prophylaxis  #at risk of VTE  - SCDs. VTE chemoprophylaxis  - GI prophylaxis per cardiac surgery    PT  - out of bed to chair  - ambulation as much as possible    Medication list reviewed.    This patient is critically ill and required my dedicated time to evaluate, manage and maintain or prevent deterioration of the organ systems and problems noted above and provide documentation.    Due to a high probability of clinically significant, life threatening deterioration due to high risk of cardiac failure, acute pulmonary insufficiency, the patient required my highest level of preparedness to intervene emergently and I personally spent this critical care time directly and personally managing the patient. This critical care time included obtaining a history when possible; examining the patient; review pulse oximetry; order / interpreting / review of laboratory and radiology studies with immediate assessment and treatment as needed; directing the titration of pressors, oxygen support devices, fluid resuscitation, interpretation of cardiac output measurements; interpretation of EKG / rhythm strips / telemetry; arranging urgent treatment with development of a management plan; evaluation of patient's response to treatment; frequent reassessment and monitor for potential decompensation; and discussion with other providers/consultants.  This critical care time was performed to assess and manage the high probability of imminent life threatening deterioration that could result in organ(s) failure. It was exclusive of separately billable procedures and treating other patients and teaching time. please see MDM (medical decision making) / Assessment / Plans sections and the rest of the note for further information on patient assessment and treatment.    Time I spent with family or surrogate(s) is included only if the patient was incapable of providing the necessary information or participating in medical decision making. Time devoted to teaching and to any procedures I billed separately is not included.     Management of this patient was discussed with the surgical attending / cardiologist and mid-level providers.    A central line, if present, continues to be necessary for infusion of medications and IV fluids. It is to be removed when other adequate venous access is accomplished.    A restraint, if present, remains because the patient cannot be safely managed without restraints as potential for harm secondary to inadvertent movement and loss of tubes/lines outweighs the burden of restraint.    A silvestre catheter, if present, remains necessary to monitor urine output continuously, and/or divert urine from the skin. It will be removed per policy when it is not needed for these purposes.    I acknowledge the use of copied documentation.  All copy forward documentation is my own and has been reviewed and revised as appropriate.  If no changes were made, it is because that information remains the same.    Trace Caicedo MD  Critical Care Medicine   59M w/ htn, hl, aaa, obesity, nephrolithiasis, gerd, cholecystitis (s/p lap abdulaziz), obesity, former smoker s/p CABG x4 2/19     Assessment/Plan    Neuro:  #acute postoperative pain  - pain controlled  #at risk for postoperative/ICU delirium  - frequent re-orientation, good sleep hygiene, avoid medications which can increase delirium risk  - CAM-ICU qShift    CV: cad s/p cabg  - Continue statin, aspirin and beta blocker  - low dose calcium channel blocker for vasospasm prevention   #afib  - in sinus today. continue amio drip per cardiac surgery    Pulm:  #acute postoperative pulmonary insufficiency  #atelectasis  - aggressive incentive spirometry and chest PT to address atelectasis !!once extubated  #acute pulmonary edema  - continue diuresis    Renal/Fluid/Lytes:  #acute fluid overload  - continue diuresis  - replete/optimize electrolytes  - preload and after optimization    Heme:  #Acute blood loss anemia from surgery  - transfuse for hemoglobin < 7 or hemodynamic instability due to anemia    ID:  #at risk for surgical infection  - no signs of acute infection    Endocrine:   #acute postoperative stress hyperglycemia  - insulin therapy to achieve tight glucose control    GI:  #at risk for malnutrition  - enteral diet  #at risk for post-operative ileus and opioid-induced constipation  - bowel regimen    Prophylaxis  #at risk of VTE  - SCDs. VTE chemoprophylaxis  - GI prophylaxis per cardiac surgery    PT  - out of bed to chair  - ambulation as much as possible    Medication list reviewed.    This patient is critically ill and required my dedicated time to evaluate, manage and maintain or prevent deterioration of the organ systems and problems noted above and provide documentation.    Due to a high probability of clinically significant, life threatening deterioration due to high risk of cardiac failure, acute pulmonary insufficiency, the patient required my highest level of preparedness to intervene emergently and I personally spent this critical care time directly and personally managing the patient. This critical care time included obtaining a history when possible; examining the patient; review pulse oximetry; order / interpreting / review of laboratory and radiology studies with immediate assessment and treatment as needed; directing the titration of pressors, oxygen support devices, fluid resuscitation, interpretation of cardiac output measurements; interpretation of EKG / rhythm strips / telemetry; arranging urgent treatment with development of a management plan; evaluation of patient's response to treatment; frequent reassessment and monitor for potential decompensation; and discussion with other providers/consultants.  This critical care time was performed to assess and manage the high probability of imminent life threatening deterioration that could result in organ(s) failure. It was exclusive of separately billable procedures and treating other patients and teaching time. please see MDM (medical decision making) / Assessment / Plans sections and the rest of the note for further information on patient assessment and treatment.    Time I spent with family or surrogate(s) is included only if the patient was incapable of providing the necessary information or participating in medical decision making. Time devoted to teaching and to any procedures I billed separately is not included.     Management of this patient was discussed with the surgical attending / cardiologist and mid-level providers.    A central line, if present, continues to be necessary for infusion of medications and IV fluids. It is to be removed when other adequate venous access is accomplished.    A restraint, if present, remains because the patient cannot be safely managed without restraints as potential for harm secondary to inadvertent movement and loss of tubes/lines outweighs the burden of restraint.    A silvestre catheter, if present, remains necessary to monitor urine output continuously, and/or divert urine from the skin. It will be removed per policy when it is not needed for these purposes.    I acknowledge the use of copied documentation.  All copy forward documentation is my own and has been reviewed and revised as appropriate.  If no changes were made, it is because that information remains the same.    Trace Caicedo MD  Critical Care Medicine

## 2025-02-24 NOTE — DIETITIAN INITIAL EVALUATION ADULT - OTHER CALCULATIONS
Using ABW: ENERGY: 3361-4526 kcal/day (15-20 kcal/kg); PROTEIN: 103-121 g/day (1.2-1.4 g/kg IBW 86.4 KG); FLUID: 1mL/kcal -- with consideration for age, BMI, acuity of illness

## 2025-02-24 NOTE — DIETITIAN INITIAL EVALUATION ADULT - PERTINENT MEDS FT
MEDICATIONS  (STANDING):  albuterol/ipratropium for Nebulization 3 milliLiter(s) Nebulizer every 6 hours  aMIOdarone Infusion 1 mG/Min (33.3 mL/Hr) IV Continuous <Continuous>  amLODIPine   Tablet 5 milliGRAM(s) Oral daily  ascorbic acid 500 milliGRAM(s) Oral daily  aspirin enteric coated 81 milliGRAM(s) Oral daily  bacitracin   Ointment 1 Application(s) Topical every 12 hours  bisacodyl 5 milliGRAM(s) Oral every 12 hours  bisacodyl Suppository 10 milliGRAM(s) Rectal once  chlorhexidine 2% Cloths 1 Application(s) Topical daily  clopidogrel Tablet 75 milliGRAM(s) Oral daily  dextrose 5%. 1000 milliLiter(s) (50 mL/Hr) IV Continuous <Continuous>  dextrose 5%. 1000 milliLiter(s) (100 mL/Hr) IV Continuous <Continuous>  dextrose 50% Injectable 50 milliLiter(s) IV Push every 15 minutes  dextrose 50% Injectable 25 milliLiter(s) IV Push every 15 minutes  famotidine    Tablet 20 milliGRAM(s) Oral every 12 hours  fluticasone propionate/ salmeterol 100-50 MICROgram(s) Diskus 1 Dose(s) Inhalation two times a day  folic acid 1 milliGRAM(s) Oral daily  furosemide   Injectable 40 milliGRAM(s) IV Push daily  glucagon  Injectable 1 milliGRAM(s) IntraMuscular once  heparin   Injectable 5000 Unit(s) SubCutaneous every 12 hours  insulin lispro (ADMELOG) corrective regimen sliding scale   SubCutaneous three times a day before meals  insulin lispro (ADMELOG) corrective regimen sliding scale   SubCutaneous at bedtime  iron sucrose IVPB 200 milliGRAM(s) IV Intermittent every 24 hours  lidocaine   4% Patch 1 Patch Transdermal daily  metolazone 5 milliGRAM(s) Oral daily  metoprolol tartrate 25 milliGRAM(s) Oral every 8 hours  polyethylene glycol 3350 17 Gram(s) Oral daily  potassium chloride    Tablet ER 40 milliEquivalent(s) Oral daily  procainamide   IVPB 1000 milliGRAM(s) IV Intermittent once  rosuvastatin 20 milliGRAM(s) Oral at bedtime  senna 2 Tablet(s) Oral at bedtime  valsartan 160 milliGRAM(s) Oral daily    MEDICATIONS  (PRN):  dextrose Oral Gel 15 Gram(s) Oral once PRN Blood Glucose LESS THAN 70 milliGRAM(s)/deciliter  hydrALAZINE Injectable 10 milliGRAM(s) IV Push every 1 hour PRN SBP>150  ketorolac   Injectable 15 milliGRAM(s) IV Push every 6 hours PRN Severe Pain (7 - 10)  labetalol Injectable 10 milliGRAM(s) IV Push every 2 hours PRN Diastolic blood pressure > 150

## 2025-02-24 NOTE — DIETITIAN INITIAL EVALUATION ADULT - ORAL INTAKE PTA/DIET HISTORY
Discussed with pt at bedside this morning. Pt reports having good appetite prior to admit; consumed 3 meals daily. Denies drinking protein shake supplements. Denies taking vitamin or minerals. NKFA; denies any restrictive cultural or Jehovah's witness food preferences. No chewing or swallowing difficulties noted with regular textured food.

## 2025-02-24 NOTE — DIETITIAN INITIAL EVALUATION ADULT - NAME AND PHONE
Anastasiia [id number] or TEAMS    Nutrition Interventions: Meals, snacks, supplements; Nutrition Monitoring: Diet order, PO intake, weights, labs, NFPF, body composition, BM and tolerance to medical food supplements

## 2025-02-24 NOTE — DIETITIAN INITIAL EVALUATION ADULT - REASON FOR ADMISSION
Atherosclerosis of native coronary artery without angina pectoris    
within normal limits
within normal limits

## 2025-02-24 NOTE — DIETITIAN INITIAL EVALUATION ADULT - ADD RECOMMEND
1. ADD Glucerna Shake 2X/DAILY to optimize kcal/pro intake -- provides 440 kcal, 20g pro total   2. Encourage PO intake     Moderate risk f/u

## 2025-02-24 NOTE — DIETITIAN INITIAL EVALUATION ADULT - NS FNS DIET ORDER
Diet, DASH/TLC:   Sodium & Cholesterol Restricted  Consistent Carbohydrate {No Snacks} (CSTCHO) (02-20-25 @ 08:39) [Active]

## 2025-02-24 NOTE — DIETITIAN INITIAL EVALUATION ADULT - PERTINENT LABORATORY DATA
02-24    134[L]  |  97[L]  |  28[H]  ----------------------------<  119[H]  4.1   |  27  |  1.0    Ca    7.7[L]      24 Feb 2025 01:24  Mg     2.2     02-24    TPro  5.6[L]  /  Alb  3.4[L]  /  TBili  0.7  /  DBili  x   /  AST  62[H]  /  ALT  41  /  AlkPhos  46  02-24  POCT Blood Glucose.: 132 mg/dL (02-24-25 @ 06:53)  A1C with Estimated Average Glucose Result: 6.0 % (02-11-25 @ 13:01)

## 2025-02-24 NOTE — PROGRESS NOTE ADULT - SUBJECTIVE AND OBJECTIVE BOX
CTU Attending Progress Daily Note    CABG x4  He has history of Hypertension    Gallstones    History of abdominal aortic aneurysm (AAA)    Obesity    CAD (coronary artery disease)    History of hepatitis C    HPI:    OBJECTIVE:  ICU Vital Signs Last 24 Hrs  T(C): 37.1 (24 Feb 2025 08:00), Max: 37.7 (23 Feb 2025 20:00)  T(F): 98.8 (24 Feb 2025 08:00), Max: 99.9 (23 Feb 2025 20:00)  HR: 93 (24 Feb 2025 08:00) (77 - 148)  BP: 103/62 (24 Feb 2025 07:00) (101/66 - 137/75)  BP(mean): 77 (24 Feb 2025 07:00) (74 - 102)  ABP: 116/53 (24 Feb 2025 07:00) (96/59 - 151/63)  ABP(mean): 70 (24 Feb 2025 07:00) (65 - 87)  RR: 35 (24 Feb 2025 08:00) (17 - 38)  SpO2: 93% (24 Feb 2025 07:00) (91% - 97%)    O2 Parameters below as of 24 Feb 2025 08:00  Patient On (Oxygen Delivery Method): room air          I&O's Summary    23 Feb 2025 07:01  -  24 Feb 2025 07:00  --------------------------------------------------------  IN: 1913 mL / OUT: 3525 mL / NET: -1612 mL    24 Feb 2025 07:01  -  24 Feb 2025 10:07  --------------------------------------------------------  IN: 66.6 mL / OUT: 200 mL / NET: -133.4 mL      I&O's Detail    23 Feb 2025 07:01  -  24 Feb 2025 07:00  --------------------------------------------------------  IN:    Amiodarone: 66.8 mL    Amiodarone: 466.2 mL    IV PiggyBack: 500 mL    Oral Fluid: 880 mL  Total IN: 1913 mL    OUT:    Indwelling Catheter - Urethral (mL): 1850 mL    Voided (mL): 1675 mL  Total OUT: 3525 mL    Total NET: -1612 mL      24 Feb 2025 07:01  -  24 Feb 2025 10:07  --------------------------------------------------------  IN:    Amiodarone: 66.6 mL  Total IN: 66.6 mL    OUT:    Voided (mL): 200 mL  Total OUT: 200 mL    Total NET: -133.4 mL        Adult Advanced Hemodynamics Last 24 Hrs  CVP(mm Hg): --  CVP(cm H2O): --  CO: --  CI: --  PA: --  PA(mean): --  PCWP: --  SVR: --  SVRI: --  PVR: --  PVRI: --    CAPILLARY BLOOD GLUCOSE      POCT Blood Glucose.: 132 mg/dL (24 Feb 2025 06:53)  POCT Blood Glucose.: 121 mg/dL (23 Feb 2025 19:56)  POCT Blood Glucose.: 101 mg/dL (23 Feb 2025 17:14)  POCT Blood Glucose.: 124 mg/dL (23 Feb 2025 12:02)    LABS:  ABG - ( 24 Feb 2025 03:57 )  pH, Arterial: 7.51  pH, Blood: x     /  pCO2: 38    /  pO2: 73    / HCO3: 30    / Base Excess: 6.9   /  SaO2: 98.9                                    8.7    7.35  )-----------( 144      ( 24 Feb 2025 01:24 )             26.3     02-24    134[L]  |  97[L]  |  28[H]  ----------------------------<  119[H]  4.1   |  27  |  1.0    Ca    7.7[L]      24 Feb 2025 01:24  Mg     2.2     02-24    TPro  5.6[L]  /  Alb  3.4[L]  /  TBili  0.7  /  DBili  x   /  AST  62[H]  /  ALT  41  /  AlkPhos  46  02-24      Urinalysis Basic - ( 24 Feb 2025 01:24 )    Color: x / Appearance: x / SG: x / pH: x  Gluc: 119 mg/dL / Ketone: x  / Bili: x / Urobili: x   Blood: x / Protein: x / Nitrite: x   Leuk Esterase: x / RBC: x / WBC x   Sq Epi: x / Non Sq Epi: x / Bacteria: x        Home Medications:  amLODIPine 5 mg oral tablet: 1 tab(s) orally 2 times a day (19 Feb 2025 06:54)  aspirin 81 mg oral tablet: orally once a day (19 Feb 2025 06:54)  clopidogrel 75 mg oral tablet: 1 tab(s) orally once a day (19 Feb 2025 06:54)  MELOXICAM 15 MG TABLET:  (19 Feb 2025 06:54)  metoprolol succinate 50 mg oral capsule, extended release: 1 cap(s) orally once a day (at bedtime) (19 Feb 2025 06:54)  omeprazole 40 mg oral delayed release capsule: 1 cap(s) orally once a day (19 Feb 2025 06:54)  rosuvastatin 20 mg oral tablet: 1 tab(s) orally once a day (at bedtime) (19 Feb 2025 06:54)  valsartan 160 mg oral tablet: 1 tab(s) orally once a day (19 Feb 2025 06:54)    HOSPITAL MEDICATIONS:  MEDICATIONS  (STANDING):  albuterol/ipratropium for Nebulization 3 milliLiter(s) Nebulizer every 6 hours  aMIOdarone Infusion 1 mG/Min (33.3 mL/Hr) IV Continuous <Continuous>  amLODIPine   Tablet 5 milliGRAM(s) Oral daily  ascorbic acid 500 milliGRAM(s) Oral daily  aspirin enteric coated 81 milliGRAM(s) Oral daily  bacitracin   Ointment 1 Application(s) Topical every 12 hours  bisacodyl 5 milliGRAM(s) Oral every 12 hours  bisacodyl Suppository 10 milliGRAM(s) Rectal once  chlorhexidine 2% Cloths 1 Application(s) Topical daily  clopidogrel Tablet 75 milliGRAM(s) Oral daily  dextrose 5%. 1000 milliLiter(s) (50 mL/Hr) IV Continuous <Continuous>  dextrose 5%. 1000 milliLiter(s) (100 mL/Hr) IV Continuous <Continuous>  dextrose 50% Injectable 50 milliLiter(s) IV Push every 15 minutes  dextrose 50% Injectable 25 milliLiter(s) IV Push every 15 minutes  famotidine    Tablet 20 milliGRAM(s) Oral every 12 hours  fluticasone propionate/ salmeterol 100-50 MICROgram(s) Diskus 1 Dose(s) Inhalation two times a day  folic acid 1 milliGRAM(s) Oral daily  furosemide   Injectable 40 milliGRAM(s) IV Push daily  glucagon  Injectable 1 milliGRAM(s) IntraMuscular once  heparin   Injectable 5000 Unit(s) SubCutaneous every 12 hours  insulin lispro (ADMELOG) corrective regimen sliding scale   SubCutaneous three times a day before meals  insulin lispro (ADMELOG) corrective regimen sliding scale   SubCutaneous at bedtime  iron sucrose IVPB 200 milliGRAM(s) IV Intermittent every 24 hours  lidocaine   4% Patch 1 Patch Transdermal daily  metolazone 5 milliGRAM(s) Oral daily  metoprolol tartrate 25 milliGRAM(s) Oral every 8 hours  polyethylene glycol 3350 17 Gram(s) Oral daily  potassium chloride    Tablet ER 40 milliEquivalent(s) Oral daily  rosuvastatin 20 milliGRAM(s) Oral at bedtime  senna 2 Tablet(s) Oral at bedtime  valsartan 160 milliGRAM(s) Oral daily    MEDICATIONS  (PRN):  dextrose Oral Gel 15 Gram(s) Oral once PRN Blood Glucose LESS THAN 70 milliGRAM(s)/deciliter  hydrALAZINE Injectable 10 milliGRAM(s) IV Push every 1 hour PRN SBP>150  ketorolac   Injectable 15 milliGRAM(s) IV Push every 6 hours PRN Severe Pain (7 - 10)  labetalol Injectable 10 milliGRAM(s) IV Push every 2 hours PRN Diastolic blood pressure > 150    RADIOLOGY:  Chest X-ray Reviewed    General appearance - wake and alert  Heart - regular rate and rhythm, no ectopy  Pulm - bilateral chest rise  Abdomen - soft, non-distended  EXTREMITIES: warm. good capillary refill. No cyanosis. No erythema  Neurological - awake and alert, moves all extremities     CTU Attending Progress Daily Note    59M w/ htn, hl, aaa, obesity, nephrolithiasis, gerd, cholecystitis (s/p lap abdulaziz), obesity, former smoker s/p CABG x4 2/19     OBJECTIVE:  ICU Vital Signs Last 24 Hrs  T(C): 37.1 (24 Feb 2025 08:00), Max: 37.7 (23 Feb 2025 20:00)  T(F): 98.8 (24 Feb 2025 08:00), Max: 99.9 (23 Feb 2025 20:00)  HR: 93 (24 Feb 2025 08:00) (77 - 148)  BP: 103/62 (24 Feb 2025 07:00) (101/66 - 137/75)  BP(mean): 77 (24 Feb 2025 07:00) (74 - 102)  ABP: 116/53 (24 Feb 2025 07:00) (96/59 - 151/63)  ABP(mean): 70 (24 Feb 2025 07:00) (65 - 87)  RR: 35 (24 Feb 2025 08:00) (17 - 38)  SpO2: 93% (24 Feb 2025 07:00) (91% - 97%)    O2 Parameters below as of 24 Feb 2025 08:00  Patient On (Oxygen Delivery Method): room air          I&O's Summary    23 Feb 2025 07:01  -  24 Feb 2025 07:00  --------------------------------------------------------  IN: 1913 mL / OUT: 3525 mL / NET: -1612 mL    24 Feb 2025 07:01  -  24 Feb 2025 10:07  --------------------------------------------------------  IN: 66.6 mL / OUT: 200 mL / NET: -133.4 mL      I&O's Detail    23 Feb 2025 07:01  -  24 Feb 2025 07:00  --------------------------------------------------------  IN:    Amiodarone: 66.8 mL    Amiodarone: 466.2 mL    IV PiggyBack: 500 mL    Oral Fluid: 880 mL  Total IN: 1913 mL    OUT:    Indwelling Catheter - Urethral (mL): 1850 mL    Voided (mL): 1675 mL  Total OUT: 3525 mL    Total NET: -1612 mL      24 Feb 2025 07:01  -  24 Feb 2025 10:07  --------------------------------------------------------  IN:    Amiodarone: 66.6 mL  Total IN: 66.6 mL    OUT:    Voided (mL): 200 mL  Total OUT: 200 mL    Total NET: -133.4 mL        Adult Advanced Hemodynamics Last 24 Hrs  CVP(mm Hg): --  CVP(cm H2O): --  CO: --  CI: --  PA: --  PA(mean): --  PCWP: --  SVR: --  SVRI: --  PVR: --  PVRI: --    CAPILLARY BLOOD GLUCOSE      POCT Blood Glucose.: 132 mg/dL (24 Feb 2025 06:53)  POCT Blood Glucose.: 121 mg/dL (23 Feb 2025 19:56)  POCT Blood Glucose.: 101 mg/dL (23 Feb 2025 17:14)  POCT Blood Glucose.: 124 mg/dL (23 Feb 2025 12:02)    LABS:  ABG - ( 24 Feb 2025 03:57 )  pH, Arterial: 7.51  pH, Blood: x     /  pCO2: 38    /  pO2: 73    / HCO3: 30    / Base Excess: 6.9   /  SaO2: 98.9                                    8.7    7.35  )-----------( 144      ( 24 Feb 2025 01:24 )             26.3     02-24    134[L]  |  97[L]  |  28[H]  ----------------------------<  119[H]  4.1   |  27  |  1.0    Ca    7.7[L]      24 Feb 2025 01:24  Mg     2.2     02-24    TPro  5.6[L]  /  Alb  3.4[L]  /  TBili  0.7  /  DBili  x   /  AST  62[H]  /  ALT  41  /  AlkPhos  46  02-24      Urinalysis Basic - ( 24 Feb 2025 01:24 )    Color: x / Appearance: x / SG: x / pH: x  Gluc: 119 mg/dL / Ketone: x  / Bili: x / Urobili: x   Blood: x / Protein: x / Nitrite: x   Leuk Esterase: x / RBC: x / WBC x   Sq Epi: x / Non Sq Epi: x / Bacteria: x        Home Medications:  amLODIPine 5 mg oral tablet: 1 tab(s) orally 2 times a day (19 Feb 2025 06:54)  aspirin 81 mg oral tablet: orally once a day (19 Feb 2025 06:54)  clopidogrel 75 mg oral tablet: 1 tab(s) orally once a day (19 Feb 2025 06:54)  MELOXICAM 15 MG TABLET:  (19 Feb 2025 06:54)  metoprolol succinate 50 mg oral capsule, extended release: 1 cap(s) orally once a day (at bedtime) (19 Feb 2025 06:54)  omeprazole 40 mg oral delayed release capsule: 1 cap(s) orally once a day (19 Feb 2025 06:54)  rosuvastatin 20 mg oral tablet: 1 tab(s) orally once a day (at bedtime) (19 Feb 2025 06:54)  valsartan 160 mg oral tablet: 1 tab(s) orally once a day (19 Feb 2025 06:54)    HOSPITAL MEDICATIONS:  MEDICATIONS  (STANDING):  albuterol/ipratropium for Nebulization 3 milliLiter(s) Nebulizer every 6 hours  aMIOdarone Infusion 1 mG/Min (33.3 mL/Hr) IV Continuous <Continuous>  amLODIPine   Tablet 5 milliGRAM(s) Oral daily  ascorbic acid 500 milliGRAM(s) Oral daily  aspirin enteric coated 81 milliGRAM(s) Oral daily  bacitracin   Ointment 1 Application(s) Topical every 12 hours  bisacodyl 5 milliGRAM(s) Oral every 12 hours  bisacodyl Suppository 10 milliGRAM(s) Rectal once  chlorhexidine 2% Cloths 1 Application(s) Topical daily  clopidogrel Tablet 75 milliGRAM(s) Oral daily  dextrose 5%. 1000 milliLiter(s) (50 mL/Hr) IV Continuous <Continuous>  dextrose 5%. 1000 milliLiter(s) (100 mL/Hr) IV Continuous <Continuous>  dextrose 50% Injectable 50 milliLiter(s) IV Push every 15 minutes  dextrose 50% Injectable 25 milliLiter(s) IV Push every 15 minutes  famotidine    Tablet 20 milliGRAM(s) Oral every 12 hours  fluticasone propionate/ salmeterol 100-50 MICROgram(s) Diskus 1 Dose(s) Inhalation two times a day  folic acid 1 milliGRAM(s) Oral daily  furosemide   Injectable 40 milliGRAM(s) IV Push daily  glucagon  Injectable 1 milliGRAM(s) IntraMuscular once  heparin   Injectable 5000 Unit(s) SubCutaneous every 12 hours  insulin lispro (ADMELOG) corrective regimen sliding scale   SubCutaneous three times a day before meals  insulin lispro (ADMELOG) corrective regimen sliding scale   SubCutaneous at bedtime  iron sucrose IVPB 200 milliGRAM(s) IV Intermittent every 24 hours  lidocaine   4% Patch 1 Patch Transdermal daily  metolazone 5 milliGRAM(s) Oral daily  metoprolol tartrate 25 milliGRAM(s) Oral every 8 hours  polyethylene glycol 3350 17 Gram(s) Oral daily  potassium chloride    Tablet ER 40 milliEquivalent(s) Oral daily  rosuvastatin 20 milliGRAM(s) Oral at bedtime  senna 2 Tablet(s) Oral at bedtime  valsartan 160 milliGRAM(s) Oral daily    MEDICATIONS  (PRN):  dextrose Oral Gel 15 Gram(s) Oral once PRN Blood Glucose LESS THAN 70 milliGRAM(s)/deciliter  hydrALAZINE Injectable 10 milliGRAM(s) IV Push every 1 hour PRN SBP>150  ketorolac   Injectable 15 milliGRAM(s) IV Push every 6 hours PRN Severe Pain (7 - 10)  labetalol Injectable 10 milliGRAM(s) IV Push every 2 hours PRN Diastolic blood pressure > 150    RADIOLOGY:  Chest X-ray Reviewed    General appearance - wake and alert  Heart - regular rate and rhythm, no ectopy  Pulm - bilateral chest rise  Abdomen - soft, non-distended  EXTREMITIES: warm. good capillary refill. No cyanosis. No erythema  Neurological - awake and alert, moves all extremities

## 2025-02-24 NOTE — PROGRESS NOTE ADULT - SUBJECTIVE AND OBJECTIVE BOX
OPERATIVE PROCEDURE(s):    cabg x 4             POD # 5    SURGEON(s): charu    SUBJECTIVE ASSESSMENT: pt is without complaints and went back into a. fib this AM    Vital Signs Last 24 Hrs  T(C): 37.1 (24 Feb 2025 08:00), Max: 37.7 (23 Feb 2025 20:00)  T(F): 98.8 (24 Feb 2025 08:00), Max: 99.9 (23 Feb 2025 20:00)  HR: 91 (24 Feb 2025 13:00) (77 - 148)  BP: 118/69 (24 Feb 2025 13:00) (96/63 - 137/75)  BP(mean): 89 (24 Feb 2025 13:00) (73 - 102)  RR: 18 (24 Feb 2025 13:00) (17 - 38)  SpO2: 94% (24 Feb 2025 13:00) (91% - 98%)    Parameters below as of 24 Feb 2025 13:00  Patient On (Oxygen Delivery Method): nasal cannula  O2 Flow (L/min): 2    02-23-25 @ 07:01  -  02-24-25 @ 07:00  --------------------------------------------------------  IN: 1913 mL / OUT: 3525 mL / NET: -1612 mL    02-24-25 @ 07:01  - 02-24-25 @ 14:12  --------------------------------------------------------  IN: 499.8 mL / OUT: 575 mL / NET: -75.2 mL    Physical Exam:  General: NAD; A&Ox3, no focal deficits, clear speech   Cardiac: S1/S2, RRR, no rubs  Lungs: unlabored respirations, CTA b/l, no wheeze, no rales, no crackles  Abdomen: Soft/NT/ND  Chest- multiple skin tears on the chest wall from tape  Sternum: Intact, no click, dressing with minimal dried blood, intact. No erythema, swelling, active discharge.  R leg wrapped in ace bandage  Extremities: Mild nonpitting edema B/L UE and LE; no cyanosis. Strength 5/5 of distal LE B/L.    LABS:                        8.7    7.35  )-----------( 144      ( 24 Feb 2025 01:24 )             26.3     COUMADIN:   [ ] YES [x ] NO      02-24    134[L]  |  97[L]  |  28[H]  ----------------------------<  119[H]  4.1   |  27  |  1.0    Ca    7.7[L]      24 Feb 2025 01:24  Mg     2.2     02-24    TPro  5.6[L]  /  Alb  3.4[L]  /  TBili  0.7  /  DBili  x   /  AST  62[H]  /  ALT  41  /  AlkPhos  46  02-24    Urinalysis Basic - ( 24 Feb 2025 01:24 )    Color: x / Appearance: x / SG: x / pH: x  Gluc: 119 mg/dL / Ketone: x  / Bili: x / Urobili: x   Blood: x / Protein: x / Nitrite: x   Leuk Esterase: x / RBC: x / WBC x   Sq Epi: x / Non Sq Epi: x / Bacteria: x    MEDICATIONS  (STANDING):  albuterol/ipratropium for Nebulization 3 milliLiter(s) Nebulizer every 6 hours  aMIOdarone Infusion 1 mG/Min (33.3 mL/Hr) IV Continuous <Continuous>  amLODIPine   Tablet 5 milliGRAM(s) Oral daily  ascorbic acid 500 milliGRAM(s) Oral daily  aspirin enteric coated 81 milliGRAM(s) Oral daily  bacitracin   Ointment 1 Application(s) Topical every 12 hours  bisacodyl 5 milliGRAM(s) Oral every 12 hours  bisacodyl Suppository 10 milliGRAM(s) Rectal once  chlorhexidine 2% Cloths 1 Application(s) Topical daily  clopidogrel Tablet 75 milliGRAM(s) Oral daily  dextrose 5%. 1000 milliLiter(s) (50 mL/Hr) IV Continuous <Continuous>  dextrose 5%. 1000 milliLiter(s) (100 mL/Hr) IV Continuous <Continuous>  dextrose 50% Injectable 50 milliLiter(s) IV Push every 15 minutes  dextrose 50% Injectable 25 milliLiter(s) IV Push every 15 minutes  famotidine    Tablet 20 milliGRAM(s) Oral every 12 hours  fluticasone propionate/ salmeterol 100-50 MICROgram(s) Diskus 1 Dose(s) Inhalation two times a day  folic acid 1 milliGRAM(s) Oral daily  furosemide   Injectable 40 milliGRAM(s) IV Push daily  glucagon  Injectable 1 milliGRAM(s) IntraMuscular once  heparin   Injectable 5000 Unit(s) SubCutaneous every 12 hours  insulin lispro (ADMELOG) corrective regimen sliding scale   SubCutaneous three times a day before meals  insulin lispro (ADMELOG) corrective regimen sliding scale   SubCutaneous at bedtime  lidocaine   4% Patch 1 Patch Transdermal daily  metolazone 5 milliGRAM(s) Oral daily  metoprolol tartrate 25 milliGRAM(s) Oral every 8 hours  polyethylene glycol 3350 17 Gram(s) Oral daily  potassium chloride    Tablet ER 40 milliEquivalent(s) Oral daily  rosuvastatin 20 milliGRAM(s) Oral at bedtime  senna 2 Tablet(s) Oral at bedtime  valsartan 160 milliGRAM(s) Oral daily    MEDICATIONS  (PRN):  dextrose Oral Gel 15 Gram(s) Oral once PRN Blood Glucose LESS THAN 70 milliGRAM(s)/deciliter  hydrALAZINE Injectable 10 milliGRAM(s) IV Push every 1 hour PRN SBP>150  ketorolac   Injectable 15 milliGRAM(s) IV Push every 6 hours PRN Severe Pain (7 - 10)  labetalol Injectable 10 milliGRAM(s) IV Push every 2 hours PRN Diastolic blood pressure > 150      Allergies    No Known Allergies    Intolerances        Ambulation/Activity Status:  amb with pt      RADIOLOGY & ADDITIONAL TESTS:  Xray Chest 1 View- PORTABLE-Routine: AM   Indication: Shortness of Breath  Transport: Portable,  w/ Monitor  Provider's Contact #: [phone] (02-24-25 @ 08:50)  ACC: [id number] EXAM:  XR CHEST PORTABLE ROUTINE 1V   ORDERED BY: RA GRIDER     PROCEDURE DATE:  02/24/2025      INTERPRETATION:  CLINICAL HISTORY: Postop    COMPARISON: February 23, 2025    TECHNIQUE: Portable frontal chest radiograph.    FINDINGS:    Support devices: Stable positioning.    Cardiac/mediastinum/hilum: Cardiomegaly, thoracic aortic calcification,   status post median sternotomy, CABG..    Lung parenchyma/Pleura: Bibasilar opacities/pleural effusions    Skeleton/soft tissues: Stable.    IMPRESSION:    Bibasilar opacities/pleural pleural effusions, decreased. Redemonstration   cardiomegaly    --- End of Report ---    Assessment/Plan:  59y Male POD#5 s/p CABGx4   - Case and plan discussed with CTU Intensivist and CT Surgeon - Dr. Martin  - Continue CTU supportive care and ongoing plan of care as per continuing CTU rounds.   - Continue DVT/GI prophylaxis  - Incentive Spirometry 10 times an hour  - Continue to advance physical activity as tolerated and continue PT/OT as directed  1. CAD s/p CABG: Continue ASA, statin, and BB. Pain control prn  2. Anemia, 2/2 acute blood loss: Monitor H/H. Start venofer and folic acid.   3. HTN- cont Lopressor and Norvasc  4. Post-op A. Fib ppx: monitor electrolytes, converted to sr with amio and procan  5. COPD/Hypoxia: Continue high flow O2, duoneb q6hr, advair. Keep O2sat >92%. Encourage incentive spirometry. lasix for noncardiogenic fluid overload   dvt/gi ppx    Social Service Disposition:  home

## 2025-02-24 NOTE — DIETITIAN INITIAL EVALUATION ADULT - OTHER INFO
Pertinent Medical Information: Per H&P, pt is a 60 y/o male w/ htn, hl, aaa, obesity, nephrolithiasis, gerd, cholecystitis (s/p lap abdulaziz), obesity, former smoker s/p CABG x4 2/19.     Pertinent Subjective Information: Pt reports having fair appetite; consuming breakfast at time of visit. Pt states he usually consumes 50% of meals provided in-house. Tolerating diet texture/consistency well. No nausea or vomiting reported.     Weight Hx: Pt unsure of UBW. Current dosing weight is 122 KG. Previous admission weight was 122 KG on 2/11/25 per HIE. Weight stable within 2 weeks compared to current dosing weight. Pt does not meet weight criteria for malnutrition at this time.

## 2025-02-25 LAB
ALBUMIN SERPL ELPH-MCNC: 3.4 G/DL — LOW (ref 3.5–5.2)
ALP SERPL-CCNC: 47 U/L — SIGNIFICANT CHANGE UP (ref 30–115)
ALT FLD-CCNC: 84 U/L — HIGH (ref 0–41)
ANION GAP SERPL CALC-SCNC: 9 MMOL/L — SIGNIFICANT CHANGE UP (ref 7–14)
ANION GAP SERPL CALC-SCNC: 9 MMOL/L — SIGNIFICANT CHANGE UP (ref 7–14)
AST SERPL-CCNC: 105 U/L — HIGH (ref 0–41)
BASOPHILS # BLD AUTO: 0.03 K/UL — SIGNIFICANT CHANGE UP (ref 0–0.2)
BASOPHILS NFR BLD AUTO: 0.3 % — SIGNIFICANT CHANGE UP (ref 0–1)
BILIRUB SERPL-MCNC: 0.7 MG/DL — SIGNIFICANT CHANGE UP (ref 0.2–1.2)
BUN SERPL-MCNC: 27 MG/DL — HIGH (ref 10–20)
BUN SERPL-MCNC: 32 MG/DL — HIGH (ref 10–20)
CALCIUM SERPL-MCNC: 7.7 MG/DL — LOW (ref 8.4–10.4)
CALCIUM SERPL-MCNC: 7.7 MG/DL — LOW (ref 8.4–10.5)
CHLORIDE SERPL-SCNC: 94 MMOL/L — LOW (ref 98–110)
CHLORIDE SERPL-SCNC: 96 MMOL/L — LOW (ref 98–110)
CO2 SERPL-SCNC: 28 MMOL/L — SIGNIFICANT CHANGE UP (ref 17–32)
CO2 SERPL-SCNC: 30 MMOL/L — SIGNIFICANT CHANGE UP (ref 17–32)
CREAT SERPL-MCNC: 1.2 MG/DL — SIGNIFICANT CHANGE UP (ref 0.7–1.5)
CREAT SERPL-MCNC: 1.5 MG/DL — SIGNIFICANT CHANGE UP (ref 0.7–1.5)
EGFR: 53 ML/MIN/1.73M2 — LOW
EGFR: 53 ML/MIN/1.73M2 — LOW
EGFR: 69 ML/MIN/1.73M2 — SIGNIFICANT CHANGE UP
EGFR: 69 ML/MIN/1.73M2 — SIGNIFICANT CHANGE UP
EOSINOPHIL # BLD AUTO: 0.11 K/UL — SIGNIFICANT CHANGE UP (ref 0–0.7)
EOSINOPHIL NFR BLD AUTO: 1.2 % — SIGNIFICANT CHANGE UP (ref 0–8)
GLUCOSE BLDC GLUCOMTR-MCNC: 122 MG/DL — HIGH (ref 70–99)
GLUCOSE BLDC GLUCOMTR-MCNC: 128 MG/DL — HIGH (ref 70–99)
GLUCOSE BLDC GLUCOMTR-MCNC: 134 MG/DL — HIGH (ref 70–99)
GLUCOSE BLDC GLUCOMTR-MCNC: 139 MG/DL — HIGH (ref 70–99)
GLUCOSE BLDC GLUCOMTR-MCNC: 157 MG/DL — HIGH (ref 70–99)
GLUCOSE SERPL-MCNC: 120 MG/DL — HIGH (ref 70–99)
GLUCOSE SERPL-MCNC: 125 MG/DL — HIGH (ref 70–99)
HCT VFR BLD CALC: 25.9 % — LOW (ref 42–52)
HGB BLD-MCNC: 8.6 G/DL — LOW (ref 14–18)
IMM GRANULOCYTES NFR BLD AUTO: 1.1 % — HIGH (ref 0.1–0.3)
LYMPHOCYTES # BLD AUTO: 0.9 K/UL — LOW (ref 1.2–3.4)
LYMPHOCYTES # BLD AUTO: 10.1 % — LOW (ref 20.5–51.1)
MAGNESIUM SERPL-MCNC: 2.1 MG/DL — SIGNIFICANT CHANGE UP (ref 1.8–2.4)
MCHC RBC-ENTMCNC: 29.8 PG — SIGNIFICANT CHANGE UP (ref 27–31)
MCHC RBC-ENTMCNC: 33.2 G/DL — SIGNIFICANT CHANGE UP (ref 32–37)
MCV RBC AUTO: 89.6 FL — SIGNIFICANT CHANGE UP (ref 80–94)
MONOCYTES # BLD AUTO: 1.05 K/UL — HIGH (ref 0.1–0.6)
MONOCYTES NFR BLD AUTO: 11.8 % — HIGH (ref 1.7–9.3)
NEUTROPHILS # BLD AUTO: 6.7 K/UL — HIGH (ref 1.4–6.5)
NEUTROPHILS NFR BLD AUTO: 75.5 % — HIGH (ref 42.2–75.2)
NRBC BLD AUTO-RTO: 0 /100 WBCS — SIGNIFICANT CHANGE UP (ref 0–0)
PLATELET # BLD AUTO: 170 K/UL — SIGNIFICANT CHANGE UP (ref 130–400)
PMV BLD: 10.4 FL — SIGNIFICANT CHANGE UP (ref 7.4–10.4)
POTASSIUM SERPL-MCNC: 3.6 MMOL/L — SIGNIFICANT CHANGE UP (ref 3.5–5)
POTASSIUM SERPL-MCNC: 4.5 MMOL/L — SIGNIFICANT CHANGE UP (ref 3.5–5)
POTASSIUM SERPL-SCNC: 3.6 MMOL/L — SIGNIFICANT CHANGE UP (ref 3.5–5)
POTASSIUM SERPL-SCNC: 4.5 MMOL/L — SIGNIFICANT CHANGE UP (ref 3.5–5)
PROT SERPL-MCNC: 5.6 G/DL — LOW (ref 6–8)
RBC # BLD: 2.89 M/UL — LOW (ref 4.7–6.1)
RBC # FLD: 14.9 % — HIGH (ref 11.5–14.5)
SODIUM SERPL-SCNC: 131 MMOL/L — LOW (ref 135–146)
SODIUM SERPL-SCNC: 135 MMOL/L — SIGNIFICANT CHANGE UP (ref 135–146)
WBC # BLD: 8.89 K/UL — SIGNIFICANT CHANGE UP (ref 4.8–10.8)
WBC # FLD AUTO: 8.89 K/UL — SIGNIFICANT CHANGE UP (ref 4.8–10.8)

## 2025-02-25 PROCEDURE — 93010 ELECTROCARDIOGRAM REPORT: CPT

## 2025-02-25 PROCEDURE — 71045 X-RAY EXAM CHEST 1 VIEW: CPT | Mod: 26

## 2025-02-25 PROCEDURE — 99291 CRITICAL CARE FIRST HOUR: CPT | Mod: 24

## 2025-02-25 RX ORDER — AMIODARONE HYDROCHLORIDE 50 MG/ML
INJECTION, SOLUTION INTRAVENOUS
Refills: 0 | Status: DISCONTINUED | OUTPATIENT
Start: 2025-02-25 | End: 2025-02-28

## 2025-02-25 RX ORDER — METOPROLOL SUCCINATE 50 MG/1
50 TABLET, EXTENDED RELEASE ORAL EVERY 8 HOURS
Refills: 0 | Status: DISCONTINUED | OUTPATIENT
Start: 2025-02-25 | End: 2025-02-26

## 2025-02-25 RX ORDER — DIGOXIN 250 UG/1
500 TABLET ORAL ONCE
Refills: 0 | Status: COMPLETED | OUTPATIENT
Start: 2025-02-25 | End: 2025-02-25

## 2025-02-25 RX ORDER — MAGNESIUM SULFATE 500 MG/ML
1 SYRINGE (ML) INJECTION EVERY 12 HOURS
Refills: 0 | Status: COMPLETED | OUTPATIENT
Start: 2025-02-25 | End: 2025-02-25

## 2025-02-25 RX ORDER — ONDANSETRON HCL/PF 4 MG/2 ML
4 VIAL (ML) INJECTION EVERY 6 HOURS
Refills: 0 | Status: DISCONTINUED | OUTPATIENT
Start: 2025-02-25 | End: 2025-03-01

## 2025-02-25 RX ORDER — AMIODARONE HYDROCHLORIDE 50 MG/ML
0.5 INJECTION, SOLUTION INTRAVENOUS
Qty: 450 | Refills: 0 | Status: DISCONTINUED | OUTPATIENT
Start: 2025-02-25 | End: 2025-02-26

## 2025-02-25 RX ORDER — CALCIUM GLUCONATE 20 MG/ML
1 INJECTION, SOLUTION INTRAVENOUS ONCE
Refills: 0 | Status: COMPLETED | OUTPATIENT
Start: 2025-02-25 | End: 2025-02-25

## 2025-02-25 RX ORDER — AMIODARONE HYDROCHLORIDE 50 MG/ML
400 INJECTION, SOLUTION INTRAVENOUS EVERY 8 HOURS
Refills: 0 | Status: DISCONTINUED | OUTPATIENT
Start: 2025-02-25 | End: 2025-02-28

## 2025-02-25 RX ORDER — FENTANYL CITRATE-0.9 % NACL/PF 100MCG/2ML
25 SYRINGE (ML) INTRAVENOUS ONCE
Refills: 0 | Status: DISCONTINUED | OUTPATIENT
Start: 2025-02-25 | End: 2025-02-25

## 2025-02-25 RX ORDER — DILTIAZEM HYDROCHLORIDE 240 MG/1
10 TABLET, EXTENDED RELEASE ORAL ONCE
Refills: 0 | Status: COMPLETED | OUTPATIENT
Start: 2025-02-25 | End: 2025-02-25

## 2025-02-25 RX ADMIN — HEPARIN SODIUM 5000 UNIT(S): 1000 INJECTION INTRAVENOUS; SUBCUTANEOUS at 17:48

## 2025-02-25 RX ADMIN — Medication 5 MILLIGRAM(S): at 21:41

## 2025-02-25 RX ADMIN — Medication 5 MILLIGRAM(S): at 05:25

## 2025-02-25 RX ADMIN — CALCIUM GLUCONATE 100 GRAM(S): 20 INJECTION, SOLUTION INTRAVENOUS at 20:37

## 2025-02-25 RX ADMIN — LIDOCAINE HYDROCHLORIDE 1 PATCH: 20 JELLY TOPICAL at 11:43

## 2025-02-25 RX ADMIN — LIDOCAINE HYDROCHLORIDE 1 PATCH: 20 JELLY TOPICAL at 18:00

## 2025-02-25 RX ADMIN — IPRATROPIUM BROMIDE AND ALBUTEROL SULFATE 3 MILLILITER(S): .5; 2.5 SOLUTION RESPIRATORY (INHALATION) at 07:51

## 2025-02-25 RX ADMIN — CLOPIDOGREL BISULFATE 75 MILLIGRAM(S): 75 TABLET, FILM COATED ORAL at 11:42

## 2025-02-25 RX ADMIN — LIDOCAINE HYDROCHLORIDE 1 PATCH: 20 JELLY TOPICAL at 23:16

## 2025-02-25 RX ADMIN — Medication 1 APPLICATION(S): at 05:24

## 2025-02-25 RX ADMIN — Medication 40 MILLIEQUIVALENT(S): at 12:30

## 2025-02-25 RX ADMIN — Medication 40 MILLIEQUIVALENT(S): at 10:16

## 2025-02-25 RX ADMIN — METOPROLOL SUCCINATE 37.5 MILLIGRAM(S): 50 TABLET, EXTENDED RELEASE ORAL at 13:38

## 2025-02-25 RX ADMIN — DIGOXIN 500 MICROGRAM(S): 250 TABLET ORAL at 11:12

## 2025-02-25 RX ADMIN — FUROSEMIDE 40 MILLIGRAM(S): 10 INJECTION INTRAMUSCULAR; INTRAVENOUS at 05:24

## 2025-02-25 RX ADMIN — Medication 100 GRAM(S): at 11:12

## 2025-02-25 RX ADMIN — Medication 40 MILLIEQUIVALENT(S): at 06:33

## 2025-02-25 RX ADMIN — HEPARIN SODIUM 5000 UNIT(S): 1000 INJECTION INTRAVENOUS; SUBCUTANEOUS at 05:24

## 2025-02-25 RX ADMIN — Medication 81 MILLIGRAM(S): at 11:42

## 2025-02-25 RX ADMIN — KETOROLAC TROMETHAMINE 15 MILLIGRAM(S): 30 INJECTION, SOLUTION INTRAMUSCULAR; INTRAVENOUS at 05:31

## 2025-02-25 RX ADMIN — FOLIC ACID 1 MILLIGRAM(S): 1 TABLET ORAL at 11:43

## 2025-02-25 RX ADMIN — Medication 4 MILLIGRAM(S): at 09:05

## 2025-02-25 RX ADMIN — METOPROLOL SUCCINATE 50 MILLIGRAM(S): 50 TABLET, EXTENDED RELEASE ORAL at 21:41

## 2025-02-25 RX ADMIN — Medication 1 APPLICATION(S): at 11:40

## 2025-02-25 RX ADMIN — KETOROLAC TROMETHAMINE 15 MILLIGRAM(S): 30 INJECTION, SOLUTION INTRAMUSCULAR; INTRAVENOUS at 06:22

## 2025-02-25 RX ADMIN — AMIODARONE HYDROCHLORIDE 400 MILLIGRAM(S): 50 INJECTION, SOLUTION INTRAVENOUS at 21:41

## 2025-02-25 RX ADMIN — Medication 500 MILLIGRAM(S): at 11:42

## 2025-02-25 RX ADMIN — DILTIAZEM HYDROCHLORIDE 10 MILLIGRAM(S): 240 TABLET, EXTENDED RELEASE ORAL at 12:24

## 2025-02-25 RX ADMIN — IPRATROPIUM BROMIDE AND ALBUTEROL SULFATE 3 MILLILITER(S): .5; 2.5 SOLUTION RESPIRATORY (INHALATION) at 20:05

## 2025-02-25 RX ADMIN — METOPROLOL SUCCINATE 37.5 MILLIGRAM(S): 50 TABLET, EXTENDED RELEASE ORAL at 05:25

## 2025-02-25 RX ADMIN — Medication 100 MILLIEQUIVALENT(S): at 11:12

## 2025-02-25 RX ADMIN — Medication 20 MILLIGRAM(S): at 17:48

## 2025-02-25 RX ADMIN — Medication 100 GRAM(S): at 17:48

## 2025-02-25 RX ADMIN — Medication 25 MICROGRAM(S): at 14:53

## 2025-02-25 RX ADMIN — AMLODIPINE BESYLATE 5 MILLIGRAM(S): 10 TABLET ORAL at 05:25

## 2025-02-25 RX ADMIN — Medication 1 APPLICATION(S): at 17:48

## 2025-02-25 RX ADMIN — INSULIN LISPRO 1: 100 INJECTION, SOLUTION INTRAVENOUS; SUBCUTANEOUS at 11:47

## 2025-02-25 RX ADMIN — Medication 20 MILLIGRAM(S): at 05:25

## 2025-02-25 RX ADMIN — Medication 160 MILLIGRAM(S): at 05:25

## 2025-02-25 RX ADMIN — Medication 25 MICROGRAM(S): at 14:38

## 2025-02-25 NOTE — PROGRESS NOTE ADULT - ASSESSMENT
59M w/ htn, hl, aaa, obesity, nephrolithiasis, gerd, cholecystitis (s/p lap abdulaziz), obesity, former smoker s/p CABG x4 2/19     Assessment/Plan    Neuro:  #acute postoperative pain  - pain controlled  #at risk for postoperative/ICU delirium  - frequent re-orientation, good sleep hygiene, avoid medications which can increase delirium risk  - CAM-ICU qShift    CV: cad s/p cabg  - Continue statin, aspirin and beta blocker  - low dose calcium channel blocker for vasospasm prevention   #afib  - in sinus today. continue amio drip per cardiac surgery    Pulm:  #acute postoperative pulmonary insufficiency  #atelectasis  - aggressive incentive spirometry and chest PT to address atelectasis !!once extubated  #acute pulmonary edema  - continue diuresis    Renal/Fluid/Lytes:  #acute fluid overload  - continue diuresis  - replete/optimize electrolytes  - preload and after optimization    Heme:  #Acute blood loss anemia from surgery  - transfuse for hemoglobin < 7 or hemodynamic instability due to anemia    ID:  #at risk for surgical infection  - no signs of acute infection    Endocrine:   #acute postoperative stress hyperglycemia  - insulin therapy to achieve tight glucose control    GI:  #at risk for malnutrition  - enteral diet  #at risk for post-operative ileus and opioid-induced constipation  - bowel regimen    Prophylaxis  #at risk of VTE  - SCDs. VTE chemoprophylaxis  - GI prophylaxis per cardiac surgery    PT  - out of bed to chair  - ambulation as much as possible    Medication list reviewed.    This patient is critically ill and required my dedicated time to evaluate, manage and maintain or prevent deterioration of the organ systems and problems noted above and provide documentation.    Due to a high probability of clinically significant, life threatening deterioration due to high risk of cardiac failure, acute pulmonary insufficiency, the patient required my highest level of preparedness to intervene emergently and I personally spent this critical care time directly and personally managing the patient. This critical care time included obtaining a history when possible; examining the patient; review pulse oximetry; order / interpreting / review of laboratory and radiology studies with immediate assessment and treatment as needed; directing the titration of pressors, oxygen support devices, fluid resuscitation, interpretation of cardiac output measurements; interpretation of EKG / rhythm strips / telemetry; arranging urgent treatment with development of a management plan; evaluation of patient's response to treatment; frequent reassessment and monitor for potential decompensation; and discussion with other providers/consultants.  This critical care time was performed to assess and manage the high probability of imminent life threatening deterioration that could result in organ(s) failure. It was exclusive of separately billable procedures and treating other patients and teaching time. please see MDM (medical decision making) / Assessment / Plans sections and the rest of the note for further information on patient assessment and treatment.    Time I spent with family or surrogate(s) is included only if the patient was incapable of providing the necessary information or participating in medical decision making. Time devoted to teaching and to any procedures I billed separately is not included.     Management of this patient was discussed with the surgical attending / cardiologist and mid-level providers.    A central line, if present, continues to be necessary for infusion of medications and IV fluids. It is to be removed when other adequate venous access is accomplished.    A restraint, if present, remains because the patient cannot be safely managed without restraints as potential for harm secondary to inadvertent movement and loss of tubes/lines outweighs the burden of restraint.    A silvestre catheter, if present, remains necessary to monitor urine output continuously, and/or divert urine from the skin. It will be removed per policy when it is not needed for these purposes.    I acknowledge the use of copied documentation.  All copy forward documentation is my own and has been reviewed and revised as appropriate.  If no changes were made, it is because that information remains the same.    Trace Caicedo MD  Critical Care Medicine   59M w/ htn, hl, aaa, obesity, nephrolithiasis, gerd, cholecystitis (s/p lap abdulaziz), obesity, former smoker s/p CABG x4 2/19     Assessment/Plan    Neuro:  #acute postoperative pain  - pain controlled  #at risk for postoperative/ICU delirium  - frequent re-orientation, good sleep hygiene, avoid medications which can increase delirium risk  - CAM-ICU qShift    CV: cad s/p cabg  - Continue statin, aspirin and beta blocker  -   #afib  back and forth  episod a fib yestrday  and this am . continue amio drip 0.5  replace K     Pulm:  #acute postoperative pulmonary insufficiency  #atelectasis  - aggressive incentive spirometry and chest PT to address atelectasis !!once extubated  #acute pulmonary edema  - continue diuresis    Renal/Fluid/Lytes:  #acute fluid overload  - continue diuresis lasix 40 and Zaroxlyn 5  - replete/optimize electrolytes  - preload and after optimization    Heme:  #Acute blood loss anemia from surgery  - transfuse for hemoglobin < 7 or hemodynamic instability due to anemia    ID:  #at risk for surgical infection  - no signs of acute infection    Endocrine:   #acute postoperative stress hyperglycemia  - insulin therapy to achieve tight glucose control    GI:  #at risk for malnutrition  - enteral diet  #at risk for post-operative ileus and opioid-induced constipation  - bowel regimen    Prophylaxis  #at risk of VTE  - SCDs. VTE chemoprophylaxis  - GI prophylaxis per cardiac surgery    PT  - out of bed to chair  - ambulation as much as possible

## 2025-02-25 NOTE — PROGRESS NOTE ADULT - SUBJECTIVE AND OBJECTIVE BOX
CTU Attending Progress Daily Note    59M w/ htn, hl, aaa, obesity, nephrolithiasis, gerd, cholecystitis (s/p lap abdulaziz), obesity, former smoker s/p CABG x4 2/19     OBJECTIVE:  ICU Vital Signs Last 24 Hrs  T(C): 37.1 (24 Feb 2025 08:00), Max: 37.7 (23 Feb 2025 20:00)  T(F): 98.8 (24 Feb 2025 08:00), Max: 99.9 (23 Feb 2025 20:00)  HR: 93 (24 Feb 2025 08:00) (77 - 148)  BP: 103/62 (24 Feb 2025 07:00) (101/66 - 137/75)  BP(mean): 77 (24 Feb 2025 07:00) (74 - 102)  ABP: 116/53 (24 Feb 2025 07:00) (96/59 - 151/63)  ABP(mean): 70 (24 Feb 2025 07:00) (65 - 87)  RR: 35 (24 Feb 2025 08:00) (17 - 38)  SpO2: 93% (24 Feb 2025 07:00) (91% - 97%)    O2 Parameters below as of 24 Feb 2025 08:00  Patient On (Oxygen Delivery Method): room air          I&O's Summary    23 Feb 2025 07:01  -  24 Feb 2025 07:00  --------------------------------------------------------  IN: 1913 mL / OUT: 3525 mL / NET: -1612 mL    24 Feb 2025 07:01  -  24 Feb 2025 10:07  --------------------------------------------------------  IN: 66.6 mL / OUT: 200 mL / NET: -133.4 mL      I&O's Detail    23 Feb 2025 07:01  -  24 Feb 2025 07:00  --------------------------------------------------------  IN:    Amiodarone: 66.8 mL    Amiodarone: 466.2 mL    IV PiggyBack: 500 mL    Oral Fluid: 880 mL  Total IN: 1913 mL    OUT:    Indwelling Catheter - Urethral (mL): 1850 mL    Voided (mL): 1675 mL  Total OUT: 3525 mL    Total NET: -1612 mL      24 Feb 2025 07:01  -  24 Feb 2025 10:07  --------------------------------------------------------  IN:    Amiodarone: 66.6 mL  Total IN: 66.6 mL    OUT:    Voided (mL): 200 mL  Total OUT: 200 mL    Total NET: -133.4 mL        Adult Advanced Hemodynamics Last 24 Hrs  CVP(mm Hg): --  CVP(cm H2O): --  CO: --  CI: --  PA: --  PA(mean): --  PCWP: --  SVR: --  SVRI: --  PVR: --  PVRI: --    CAPILLARY BLOOD GLUCOSE      POCT Blood Glucose.: 132 mg/dL (24 Feb 2025 06:53)  POCT Blood Glucose.: 121 mg/dL (23 Feb 2025 19:56)  POCT Blood Glucose.: 101 mg/dL (23 Feb 2025 17:14)  POCT Blood Glucose.: 124 mg/dL (23 Feb 2025 12:02)    LABS:  ABG - ( 24 Feb 2025 03:57 )  pH, Arterial: 7.51  pH, Blood: x     /  pCO2: 38    /  pO2: 73    / HCO3: 30    / Base Excess: 6.9   /  SaO2: 98.9                                    8.7    7.35  )-----------( 144      ( 24 Feb 2025 01:24 )             26.3     02-24    134[L]  |  97[L]  |  28[H]  ----------------------------<  119[H]  4.1   |  27  |  1.0    Ca    7.7[L]      24 Feb 2025 01:24  Mg     2.2     02-24    TPro  5.6[L]  /  Alb  3.4[L]  /  TBili  0.7  /  DBili  x   /  AST  62[H]  /  ALT  41  /  AlkPhos  46  02-24      Urinalysis Basic - ( 24 Feb 2025 01:24 )    Color: x / Appearance: x / SG: x / pH: x  Gluc: 119 mg/dL / Ketone: x  / Bili: x / Urobili: x   Blood: x / Protein: x / Nitrite: x   Leuk Esterase: x / RBC: x / WBC x   Sq Epi: x / Non Sq Epi: x / Bacteria: x        Home Medications:  amLODIPine 5 mg oral tablet: 1 tab(s) orally 2 times a day (19 Feb 2025 06:54)  aspirin 81 mg oral tablet: orally once a day (19 Feb 2025 06:54)  clopidogrel 75 mg oral tablet: 1 tab(s) orally once a day (19 Feb 2025 06:54)  MELOXICAM 15 MG TABLET:  (19 Feb 2025 06:54)  metoprolol succinate 50 mg oral capsule, extended release: 1 cap(s) orally once a day (at bedtime) (19 Feb 2025 06:54)  omeprazole 40 mg oral delayed release capsule: 1 cap(s) orally once a day (19 Feb 2025 06:54)  rosuvastatin 20 mg oral tablet: 1 tab(s) orally once a day (at bedtime) (19 Feb 2025 06:54)  valsartan 160 mg oral tablet: 1 tab(s) orally once a day (19 Feb 2025 06:54)    HOSPITAL MEDICATIONS:  MEDICATIONS  (STANDING):  albuterol/ipratropium for Nebulization 3 milliLiter(s) Nebulizer every 6 hours  aMIOdarone Infusion 1 mG/Min (33.3 mL/Hr) IV Continuous <Continuous>  amLODIPine   Tablet 5 milliGRAM(s) Oral daily  ascorbic acid 500 milliGRAM(s) Oral daily  aspirin enteric coated 81 milliGRAM(s) Oral daily  bacitracin   Ointment 1 Application(s) Topical every 12 hours  bisacodyl 5 milliGRAM(s) Oral every 12 hours  bisacodyl Suppository 10 milliGRAM(s) Rectal once  chlorhexidine 2% Cloths 1 Application(s) Topical daily  clopidogrel Tablet 75 milliGRAM(s) Oral daily  dextrose 5%. 1000 milliLiter(s) (50 mL/Hr) IV Continuous <Continuous>  dextrose 5%. 1000 milliLiter(s) (100 mL/Hr) IV Continuous <Continuous>  dextrose 50% Injectable 50 milliLiter(s) IV Push every 15 minutes  dextrose 50% Injectable 25 milliLiter(s) IV Push every 15 minutes  famotidine    Tablet 20 milliGRAM(s) Oral every 12 hours  fluticasone propionate/ salmeterol 100-50 MICROgram(s) Diskus 1 Dose(s) Inhalation two times a day  folic acid 1 milliGRAM(s) Oral daily  furosemide   Injectable 40 milliGRAM(s) IV Push daily  glucagon  Injectable 1 milliGRAM(s) IntraMuscular once  heparin   Injectable 5000 Unit(s) SubCutaneous every 12 hours  insulin lispro (ADMELOG) corrective regimen sliding scale   SubCutaneous three times a day before meals  insulin lispro (ADMELOG) corrective regimen sliding scale   SubCutaneous at bedtime  iron sucrose IVPB 200 milliGRAM(s) IV Intermittent every 24 hours  lidocaine   4% Patch 1 Patch Transdermal daily  metolazone 5 milliGRAM(s) Oral daily  metoprolol tartrate 25 milliGRAM(s) Oral every 8 hours  polyethylene glycol 3350 17 Gram(s) Oral daily  potassium chloride    Tablet ER 40 milliEquivalent(s) Oral daily  rosuvastatin 20 milliGRAM(s) Oral at bedtime  senna 2 Tablet(s) Oral at bedtime  valsartan 160 milliGRAM(s) Oral daily    MEDICATIONS  (PRN):  dextrose Oral Gel 15 Gram(s) Oral once PRN Blood Glucose LESS THAN 70 milliGRAM(s)/deciliter  hydrALAZINE Injectable 10 milliGRAM(s) IV Push every 1 hour PRN SBP>150  ketorolac   Injectable 15 milliGRAM(s) IV Push every 6 hours PRN Severe Pain (7 - 10)  labetalol Injectable 10 milliGRAM(s) IV Push every 2 hours PRN Diastolic blood pressure > 150    RADIOLOGY:  Chest X-ray Reviewed    General appearance - wake and alert  Heart - regular rate and rhythm, no ectopy  Pulm - bilateral chest rise  Abdomen - soft, non-distended  EXTREMITIES: warm. good capillary refill. No cyanosis. No erythema  Neurological - awake and alert, moves all extremities     CTU Attending Progress Daily Note    59M w/ htn, hl, aaa, obesity, nephrolithiasis, gerd, cholecystitis (s/p lap abdulaziz), obesity, former smoker s/p CABG x4 2/19     Back in afib yestrday the RSR  this am again A fib rate 160  OBJECTIVE:  ICU Vital Signs Last 24 Hrs  T(C): 37.1 (24 Feb 2025 08:00), Max: 37.7 (23 Feb 2025 20:00)  T(F): 98.8 (24 Feb 2025 08:00), Max: 99.9 (23 Feb 2025 20:00)  HR: 93 (24 Feb 2025 08:00) (77 - 148)  BP: 103/62 (24 Feb 2025 07:00) (101/66 - 137/75)  BP(mean): 77 (24 Feb 2025 07:00) (74 - 102)  ABP: 116/53 (24 Feb 2025 07:00) (96/59 - 151/63)  ABP(mean): 70 (24 Feb 2025 07:00) (65 - 87)  RR: 35 (24 Feb 2025 08:00) (17 - 38)  SpO2: 93% (24 Feb 2025 07:00) (91% - 97%)    O2 Parameters below as of 24 Feb 2025 08:00  Patient On (Oxygen Delivery Method): room air        He has history of Hypertension    Gallstones    History of abdominal aortic aneurysm (AAA)    Obesity    CAD (coronary artery disease)    History of hepatitis C          OBJECTIVE:  ICU Vital Signs Last 24 Hrs  T(C): 37.1 (25 Feb 2025 08:00), Max: 37.2 (24 Feb 2025 16:00)  T(F): 98.7 (25 Feb 2025 08:00), Max: 99 (24 Feb 2025 16:00)  HR: 86 (25 Feb 2025 10:00) (75 - 101)  BP: 90/53 (25 Feb 2025 10:00) (89/50 - 125/65)  BP(mean): 69 (25 Feb 2025 10:00) (64 - 93)  ABP: --  ABP(mean): --  RR: 21 (25 Feb 2025 10:00) (18 - 31)  SpO2: 91% (25 Feb 2025 10:00) (89% - 100%)    O2 Parameters below as of 25 Feb 2025 11:00  Patient On (Oxygen Delivery Method): nasal cannula  O2 Flow (L/min): 2        I&O's Summary    24 Feb 2025 07:01  -  25 Feb 2025 07:00  --------------------------------------------------------  IN: 1219.2 mL / OUT: 1975 mL / NET: -755.8 mL    25 Feb 2025 07:01  -  25 Feb 2025 11:21  --------------------------------------------------------  IN: 83.4 mL / OUT: 200 mL / NET: -116.6 mL      I&O's Detail    24 Feb 2025 07:01  -  25 Feb 2025 07:00  --------------------------------------------------------  IN:    Amiodarone: 799.2 mL    Oral Fluid: 420 mL  Total IN: 1219.2 mL    OUT:    Voided (mL): 1975 mL  Total OUT: 1975 mL    Total NET: -755.8 mL      25 Feb 2025 07:01  -  25 Feb 2025 11:21  --------------------------------------------------------  IN:    Amiodarone: 33.3 mL    Amiodarone: 50.1 mL  Total IN: 83.4 mL    OUT:    Voided (mL): 200 mL  Total OUT: 200 mL    Total NET: -116.6 mL          CAPILLARY BLOOD GLUCOSE      POCT Blood Glucose.: 122 mg/dL (25 Feb 2025 08:22)  POCT Blood Glucose.: 139 mg/dL (25 Feb 2025 06:30)  POCT Blood Glucose.: 117 mg/dL (24 Feb 2025 20:42)  POCT Blood Glucose.: 141 mg/dL (24 Feb 2025 18:27)  POCT Blood Glucose.: 117 mg/dL (24 Feb 2025 12:33)    LABS:  ABG - ( 24 Feb 2025 03:57 )  pH, Arterial: 7.51  pH, Blood: x     /  pCO2: 38    /  pO2: 73    / HCO3: 30    / Base Excess: 6.9   /  SaO2: 98.9                                    8.6    8.89  )-----------( 170      ( 25 Feb 2025 02:19 )             25.9     02-25    135  |  96[L]  |  27[H]  ----------------------------<  120[H]  3.6   |  30  |  1.2    Ca    7.7[L]      25 Feb 2025 04:25  Mg     2.1     02-25    TPro  5.6[L]  /  Alb  3.4[L]  /  TBili  0.7  /  DBili  x   /  AST  105[H]  /  ALT  84[H]  /  AlkPhos  47  02-25      Urinalysis Basic - ( 25 Feb 2025 04:25 )    Color: x / Appearance: x / SG: x / pH: x  Gluc: 120 mg/dL / Ketone: x  / Bili: x / Urobili: x   Blood: x / Protein: x / Nitrite: x   Leuk Esterase: x / RBC: x / WBC x   Sq Epi: x / Non Sq Epi: x / Bacteria: x        Home Medications:  amLODIPine 5 mg oral tablet: 1 tab(s) orally 2 times a day (19 Feb 2025 06:54)  aspirin 81 mg oral tablet: orally once a day (19 Feb 2025 06:54)  clopidogrel 75 mg oral tablet: 1 tab(s) orally once a day (19 Feb 2025 06:54)  MELOXICAM 15 MG TABLET:  (19 Feb 2025 06:54)  metoprolol succinate 50 mg oral capsule, extended release: 1 cap(s) orally once a day (at bedtime) (19 Feb 2025 06:54)  omeprazole 40 mg oral delayed release capsule: 1 cap(s) orally once a day (19 Feb 2025 06:54)  rosuvastatin 20 mg oral tablet: 1 tab(s) orally once a day (at bedtime) (19 Feb 2025 06:54)  valsartan 160 mg oral tablet: 1 tab(s) orally once a day (19 Feb 2025 06:54)    HOSPITAL MEDICATIONS:  MEDICATIONS  (STANDING):  albuterol/ipratropium for Nebulization 3 milliLiter(s) Nebulizer every 6 hours  aMIOdarone Infusion 0.5 mG/Min (16.7 mL/Hr) IV Continuous <Continuous>  amLODIPine   Tablet 5 milliGRAM(s) Oral daily  ascorbic acid 500 milliGRAM(s) Oral daily  aspirin enteric coated 81 milliGRAM(s) Oral daily  bacitracin   Ointment 1 Application(s) Topical every 12 hours  bisacodyl 5 milliGRAM(s) Oral every 12 hours  bisacodyl Suppository 10 milliGRAM(s) Rectal once  chlorhexidine 2% Cloths 1 Application(s) Topical daily  clopidogrel Tablet 75 milliGRAM(s) Oral daily  dextrose 5%. 1000 milliLiter(s) (50 mL/Hr) IV Continuous <Continuous>  dextrose 5%. 1000 milliLiter(s) (100 mL/Hr) IV Continuous <Continuous>  dextrose 50% Injectable 50 milliLiter(s) IV Push every 15 minutes  dextrose 50% Injectable 25 milliLiter(s) IV Push every 15 minutes  famotidine    Tablet 20 milliGRAM(s) Oral every 12 hours  fluticasone propionate/ salmeterol 100-50 MICROgram(s) Diskus 1 Dose(s) Inhalation two times a day  folic acid 1 milliGRAM(s) Oral daily  furosemide   Injectable 40 milliGRAM(s) IV Push daily  glucagon  Injectable 1 milliGRAM(s) IntraMuscular once  heparin   Injectable 5000 Unit(s) SubCutaneous every 12 hours  insulin lispro (ADMELOG) corrective regimen sliding scale   SubCutaneous three times a day before meals  insulin lispro (ADMELOG) corrective regimen sliding scale   SubCutaneous at bedtime  lidocaine   4% Patch 1 Patch Transdermal daily  magnesium sulfate  IVPB 1 Gram(s) IV Intermittent every 12 hours  melatonin 5 milliGRAM(s) Oral at bedtime  metolazone 5 milliGRAM(s) Oral daily  metoprolol tartrate 37.5 milliGRAM(s) Oral every 8 hours  polyethylene glycol 3350 17 Gram(s) Oral daily  potassium chloride    Tablet ER 40 milliEquivalent(s) Oral every 4 hours  potassium chloride    Tablet ER 40 milliEquivalent(s) Oral daily  senna 2 Tablet(s) Oral at bedtime    MEDICATIONS  (PRN):  acetaminophen     Tablet .. 650 milliGRAM(s) Oral every 6 hours PRN Temp greater or equal to 38C (100.4F), Mild Pain (1 - 3)  dextrose Oral Gel 15 Gram(s) Oral once PRN Blood Glucose LESS THAN 70 milliGRAM(s)/deciliter  hydrALAZINE Injectable 10 milliGRAM(s) IV Push every 1 hour PRN SBP>150  labetalol Injectable 10 milliGRAM(s) IV Push every 2 hours PRN Diastolic blood pressure > 150  ondansetron Injectable 4 milliGRAM(s) IV Push every 6 hours PRN Nausea and/or Vomiting    RADIOLOGY:  Chest X-ray Reviewed    REVIEW OF SYSTEMS:  CONSTITUTIONAL: [X] all negative; [ ] weakness, [ ] fevers, [ ] chills  EYES/ENT: [X] all negative; [ ] visual changes, [ ] vertigo, [ ] throat pain   NECK: [X] all negative; [ ] pain, [ ] stiffness  RESPIRATORY: [] all negative, [ ] cough, [ ] wheezing, [ ] hemoptysis, [ ] shortness of breath  CARDIOVASCULAR: [] all negative; [ ] chest pain, [ ] palpitations, [ ] orthopnea  GASTROINTESTINAL: [X] all negative; [ ]abdominal pain, [ ] nausea, [ ] vomiting, [ ] hematemesis, [ ] diarrhea, [ ] constipation, [ ] melena, [ ] hematochezia.  GENITOURINARY: [X] all negative; [ ] dysuria, [ ] frequency, [ ] hematuria  NEUROLOGICAL: [X] all negative; [ ] numbness, [ ] weakness  SKIN: [X] all negative; [ ] itching, [ ] burning, [ ] rashes, [ ] lesions   All other review of systems is negative unless indicated above.  [  ] Unable to assess ROS because     PHYSICAL EXAM:          CONSTITUTIONAL: Well-developed; well-nourished; in no acute distress.   	SKIN: warm, dry  	HEAD: Normocephalic; atraumatic.  	EYES: PERRL, EOM, no conj injection, sclera clear  	ENT: No nasal discharge; airway clear.  	NECK: Supple; non tender.   	CARD: S1, S2 normal; no murmurs, gallops, or rubs. Regular rate and rhythm.  no carotid bruits  	RESP: CTA B/L; good air movement No wheezes, rales or rhonchi.  	ABD: Soft, not tender, not distended,  no rebound or guarding, bowel sounds present  	EXT: Normal ROM.  No clubbing, cyanosis or edema.   warm and well perfused.  pulses palpable  	NEURO: Alert, awake, motor 5/5 R, 5/5 L      Upon my evaluation, the above patient has a high probabillity of imminent or life threatening deterioration due to a high risk for Shock, Cardiogenic shock, arrythmias, acute blood loss, acute kidney injury and acute pulmonary insufficiency which required my direct attention, intervention, and personal management.  Total time was 55 minutes which includes review of radiology results, ordering, interpreting and reviewing diagnostic studies / lab tests with immediate assessment and treatment, consultant collaboration on findings and treatment options, monitoring for potential decompensation, obtaining history from family, EMT, nursing home staff and/or treating physicians; directing the titration of pressors, oxygen support devices, fluid resucitation, interpretation of cardiac output measurements, interpretation of radiological assessments, interpretation of EKG / rhythm strips, telemetry.  This time did not overlap with the management of other patients or performing separately reportable procedures.      Management of this patient was discussed with the CT surgery attending and mid-level providers.    I ackknowledge the use of copied documentation.  All copy forward documentation is my own and has been reviewed and revised as appropriate.  If no changes were made, it is because that information remains the same.

## 2025-02-25 NOTE — PROGRESS NOTE ADULT - SUBJECTIVE AND OBJECTIVE BOX
OPERATIVE PROCEDURE(s):                POD #6 s/p CABGx4                       SURGEON(s): YUDELKA Martin  SUBJECTIVE ASSESSMENT:59yMale patient seen and examined at bedside.    Vital Signs Last 24 Hrs  T(F): 98.7 (25 Feb 2025 08:00), Max: 99 (24 Feb 2025 16:00)  HR: 77 (25 Feb 2025 07:00) (77 - 119)  BP: 89/50 (25 Feb 2025 07:00) (89/50 - 125/65)  BP(mean): 64 (25 Feb 2025 07:00) (64 - 93)  ABP: 0/0 (24 Feb 2025 09:00) (0/0 - 0/0)  ABP(mean): 0 (24 Feb 2025 09:00)  RR: 27 (25 Feb 2025 07:00) (18 - 35)  SpO2: 89% (25 Feb 2025 07:00) (89% - 98%)  CVP(mm Hg): --  CVP(cm H2O): --  CO: --  CI: --  PA: --  SVR: --    I&O's Detail    24 Feb 2025 07:01  -  25 Feb 2025 07:00  --------------------------------------------------------  IN:    Amiodarone: 799.2 mL    Oral Fluid: 420 mL  Total IN: 1219.2 mL    OUT:    Voided (mL): 1975 mL  Total OUT: 1975 mL        Net: I&O's Detail    23 Feb 2025 07:01  -  24 Feb 2025 07:00  --------------------------------------------------------  Total NET: -1612 mL      24 Feb 2025 07:01  -  25 Feb 2025 07:00  --------------------------------------------------------  Total NET: -755.8 mL        CAPILLARY BLOOD GLUCOSE      POCT Blood Glucose.: 139 mg/dL (25 Feb 2025 06:30)  POCT Blood Glucose.: 117 mg/dL (24 Feb 2025 20:42)  POCT Blood Glucose.: 141 mg/dL (24 Feb 2025 18:27)  POCT Blood Glucose.: 117 mg/dL (24 Feb 2025 12:33)      Physical Exam:  General: NAD; A&Ox3  Cardiac: S1/S2, RRR, no murmur, no rubs  Lungs: unlabored respirations, CTA b/l, no wheeze, no rales, no crackles  Abdomen: Soft/NT/ND; positive bowel sounds x 4  Sternum: Intact, no click, incision healing well with no drainage  Incisions: Incisions clean/dry/intact  Extremities: No edema b/l lower extremities; good capillary refill; no cyanosis; palpable 1+ pedal pulses b/l    Central Venous Catheter: Yes[]  No[] , If Yes indication:                    Day #  [place] Catheter: Yes  [] , No  [] , If yes indication:                                 Day #  NGT: Yes [] No [] ,    If Yes Placement:                                                   Day #  EPICARDIAL WIRES:  [] YES [] NO                                                            Day #  BOWEL MOVEMENT:  [] YES [] NO, If No, Timing since last BM Day #  CHEST TUBE(Left/Right):  [] YES [] NO, If yes -  AIR LEAKS:  [] YES [] NO        LABS:                        8.6[L]  8.89  )-----------( 170      ( 25 Feb 2025 02:19 )             25.9[L]                        8.7[L]  7.35  )-----------( 144      ( 24 Feb 2025 01:24 )             26.3[L]    02-25    135  |  96[L]  |  27[H]  ----------------------------<  120[H]  3.6   |  30  |  1.2  02-24    134[L]  |  97[L]  |  28[H]  ----------------------------<  119[H]  4.1   |  27  |  1.0    Ca    7.7[L]      25 Feb 2025 04:25  Mg     2.1     02-25    TPro  5.6[L] [6.0 - 8.0]  /  Alb  3.4[L] [3.5 - 5.2]  /  TBili  0.7 [0.2 - 1.2]  /  DBili  x   /  AST  105[H] [0 - 41]  /  ALT  84[H] [0 - 41]  /  AlkPhos  47 [30 - 115]  02-25      Urinalysis Basic - ( 25 Feb 2025 04:25 )    Color: x / Appearance: x / SG: x / pH: x  Gluc: 120 mg/dL / Ketone: x  / Bili: x / Urobili: x   Blood: x / Protein: x / Nitrite: x   Leuk Esterase: x / RBC: x / WBC x   Sq Epi: x / Non Sq Epi: x / Bacteria: x      ABG - ( 24 Feb 2025 03:57 )  pH: 7.51  /  pCO2: 38    /  pO2: 73    / HCO3: 30    / Base Excess: 6.9   /  SaO2: 98.9  /  LA: 1.0              RADIOLOGY & ADDITIONAL TESTS:  CXR:   EKG:  Allergies    No Known Allergies    Intolerances      MEDICATIONS  (STANDING):  albuterol/ipratropium for Nebulization 3 milliLiter(s) Nebulizer every 6 hours  aMIOdarone Infusion 1 mG/Min (33.3 mL/Hr) IV Continuous <Continuous>  amLODIPine   Tablet 5 milliGRAM(s) Oral daily  ascorbic acid 500 milliGRAM(s) Oral daily  aspirin enteric coated 81 milliGRAM(s) Oral daily  bacitracin   Ointment 1 Application(s) Topical every 12 hours  bisacodyl 5 milliGRAM(s) Oral every 12 hours  bisacodyl Suppository 10 milliGRAM(s) Rectal once  chlorhexidine 2% Cloths 1 Application(s) Topical daily  clopidogrel Tablet 75 milliGRAM(s) Oral daily  dextrose 5%. 1000 milliLiter(s) (50 mL/Hr) IV Continuous <Continuous>  dextrose 5%. 1000 milliLiter(s) (100 mL/Hr) IV Continuous <Continuous>  dextrose 50% Injectable 50 milliLiter(s) IV Push every 15 minutes  dextrose 50% Injectable 25 milliLiter(s) IV Push every 15 minutes  famotidine    Tablet 20 milliGRAM(s) Oral every 12 hours  fluticasone propionate/ salmeterol 100-50 MICROgram(s) Diskus 1 Dose(s) Inhalation two times a day  folic acid 1 milliGRAM(s) Oral daily  furosemide   Injectable 40 milliGRAM(s) IV Push daily  glucagon  Injectable 1 milliGRAM(s) IntraMuscular once  heparin   Injectable 5000 Unit(s) SubCutaneous every 12 hours  insulin lispro (ADMELOG) corrective regimen sliding scale   SubCutaneous three times a day before meals  insulin lispro (ADMELOG) corrective regimen sliding scale   SubCutaneous at bedtime  lidocaine   4% Patch 1 Patch Transdermal daily  melatonin 5 milliGRAM(s) Oral at bedtime  metolazone 5 milliGRAM(s) Oral daily  metoprolol tartrate 37.5 milliGRAM(s) Oral every 8 hours  polyethylene glycol 3350 17 Gram(s) Oral daily  potassium chloride    Tablet ER 20 milliEquivalent(s) Oral once  potassium chloride    Tablet ER 40 milliEquivalent(s) Oral daily  rosuvastatin 20 milliGRAM(s) Oral at bedtime  senna 2 Tablet(s) Oral at bedtime  valsartan 160 milliGRAM(s) Oral daily    MEDICATIONS  (PRN):  acetaminophen     Tablet .. 650 milliGRAM(s) Oral every 6 hours PRN Temp greater or equal to 38C (100.4F), Mild Pain (1 - 3)  dextrose Oral Gel 15 Gram(s) Oral once PRN Blood Glucose LESS THAN 70 milliGRAM(s)/deciliter  hydrALAZINE Injectable 10 milliGRAM(s) IV Push every 1 hour PRN SBP>150  labetalol Injectable 10 milliGRAM(s) IV Push every 2 hours PRN Diastolic blood pressure > 150      Pharmacologic DVT Prophylaxis: [] YES, []NO: Contraindication:   [] HEPARIN: Dose: XX mg  Q24H    [] LOVENOX: Dose: XX mg  Q24H                 SCD's: YES b/l    GI Prophylaxis: Protonix [], Pepcid []    Post-Op Beta-Blockers: []Yes, []No: contraindication:  Post-Op Nitrate: []Yes, []No: contraindication:  Post-Op Aspirin: []Yes,  []No: contraindication:  Post-Op Statin: []Yes, []No: contraindication:      Ambulation/Activity Status:    Assessment/Plan:  59y Male POD #6 s/p CABGx4     - Case and plan discussed with CTU Intensivist and CT Surgeon - Dr. Martin  - Continue CTU supportive care and ongoing plan of care as per continuing CTU rounds.   - Continue DVT/GI prophylaxis  - Incentive Spirometry 10 times an hour  - Continue to advance physical activity as tolerated and continue PT/OT as directed  1. CAD s/p CABG: Continue ASA, statin, and BB. Pain control prn  2. Anemia, 2/2 acute blood loss: Monitor H/H. Start venofer and folic acid.   3. HTN- cont Lopressor and Norvasc  4. Post-op A. Fib ppx: monitor electrolytes, converted to sr with amio and procan  5. COPD/Hypoxia: Continue high flow O2, duoneb q6hr, advair. Keep O2sat >92%. Encourage incentive spirometry. lasix for noncardiogenic fluid overload   dvt/gi ppx    Social Service Disposition:  Anticipate d/c home OPERATIVE PROCEDURE(s):                POD #6 s/p CABGx4                       SURGEON(s): YUDELKA Martin  SUBJECTIVE ASSESSMENT:59yMale patient seen and examined at bedside.    Vital Signs Last 24 Hrs  T(F): 98.7 (25 Feb 2025 08:00), Max: 99 (24 Feb 2025 16:00)  HR: 77 (25 Feb 2025 07:00) (77 - 119)  BP: 89/50 (25 Feb 2025 07:00) (89/50 - 125/65)  BP(mean): 64 (25 Feb 2025 07:00) (64 - 93)  ABP: 0/0 (24 Feb 2025 09:00) (0/0 - 0/0)  ABP(mean): 0 (24 Feb 2025 09:00)  RR: 27 (25 Feb 2025 07:00) (18 - 35)  SpO2: 89% (25 Feb 2025 07:00) (89% - 98%)  CVP(mm Hg): --  CVP(cm H2O): --  CO: --  CI: --  PA: --  SVR: --    I&O's Detail    24 Feb 2025 07:01  -  25 Feb 2025 07:00  --------------------------------------------------------  IN:    Amiodarone: 799.2 mL    Oral Fluid: 420 mL  Total IN: 1219.2 mL    OUT:    Voided (mL): 1975 mL  Total OUT: 1975 mL        Net: I&O's Detail    23 Feb 2025 07:01  -  24 Feb 2025 07:00  --------------------------------------------------------  Total NET: -1612 mL      24 Feb 2025 07:01  -  25 Feb 2025 07:00  --------------------------------------------------------  Total NET: -755.8 mL        CAPILLARY BLOOD GLUCOSE      POCT Blood Glucose.: 139 mg/dL (25 Feb 2025 06:30)  POCT Blood Glucose.: 117 mg/dL (24 Feb 2025 20:42)  POCT Blood Glucose.: 141 mg/dL (24 Feb 2025 18:27)  POCT Blood Glucose.: 117 mg/dL (24 Feb 2025 12:33)      Physical Exam:  General: NAD; A&Ox3  Cardiac: S1/S2, RRR, no murmur, no rubs  Lungs: unlabored respirations, CTA b/l, no wheeze, no rales, no crackles  Abdomen: Soft/NT/ND; positive bowel sounds x 4  Sternum: Intact, no click, incision healing well with no drainage  Incisions: Incisions clean/dry/intact  Extremities: No edema b/l lower extremities; good capillary refill; no cyanosis; palpable 1+ pedal pulses b/l      LABS:                        8.6[L]  8.89  )-----------( 170      ( 25 Feb 2025 02:19 )             25.9[L]                        8.7[L]  7.35  )-----------( 144      ( 24 Feb 2025 01:24 )             26.3[L]    02-25    135  |  96[L]  |  27[H]  ----------------------------<  120[H]  3.6   |  30  |  1.2  02-24    134[L]  |  97[L]  |  28[H]  ----------------------------<  119[H]  4.1   |  27  |  1.0    Ca    7.7[L]      25 Feb 2025 04:25  Mg     2.1     02-25    TPro  5.6[L] [6.0 - 8.0]  /  Alb  3.4[L] [3.5 - 5.2]  /  TBili  0.7 [0.2 - 1.2]  /  DBili  x   /  AST  105[H] [0 - 41]  /  ALT  84[H] [0 - 41]  /  AlkPhos  47 [30 - 115]  02-25    Urinalysis Basic - ( 25 Feb 2025 04:25 )    Color: x / Appearance: x / SG: x / pH: x  Gluc: 120 mg/dL / Ketone: x  / Bili: x / Urobili: x   Blood: x / Protein: x / Nitrite: x   Leuk Esterase: x / RBC: x / WBC x   Sq Epi: x / Non Sq Epi: x / Bacteria: x      ABG - ( 24 Feb 2025 03:57 )  pH: 7.51  /  pCO2: 38    /  pO2: 73    / HCO3: 30    / Base Excess: 6.9   /  SaO2: 98.9  /  LA: 1.0      RADIOLOGY & ADDITIONAL TESTS:  CXR: ACC: [id number] EXAM:  XR CHEST PORTABLE ROUTINE 1V   ORDERED BY: CHANDLER DAVIS     PROCEDURE DATE:  02/25/2025      INTERPRETATION:  CLINICAL HISTORY: Shortness of breath.     Frontal view chest    Comparison: Chest x-rayFebruary 24, 2025  Findings/  impression:    EKG leads overlie thorax, obscuring anatomy.    Support devices: None    Cardiac/ Mediastinum: Stable cardiomegaly. Poststernotomy    Lungs/ Pleura: Stable basilar opacities. No pneumothorax    Skeletal/ soft tissues: Stable  EKG: Intermittent A. fib with SR  Allergies    No Known Allergies    Intolerances    MEDICATIONS  (STANDING):  albuterol/ipratropium for Nebulization 3 milliLiter(s) Nebulizer every 6 hours  aMIOdarone Infusion 1 mG/Min (33.3 mL/Hr) IV Continuous <Continuous>  amLODIPine   Tablet 5 milliGRAM(s) Oral daily  ascorbic acid 500 milliGRAM(s) Oral daily  aspirin enteric coated 81 milliGRAM(s) Oral daily  bacitracin   Ointment 1 Application(s) Topical every 12 hours  bisacodyl 5 milliGRAM(s) Oral every 12 hours  bisacodyl Suppository 10 milliGRAM(s) Rectal once  chlorhexidine 2% Cloths 1 Application(s) Topical daily  clopidogrel Tablet 75 milliGRAM(s) Oral daily  dextrose 5%. 1000 milliLiter(s) (50 mL/Hr) IV Continuous <Continuous>  dextrose 5%. 1000 milliLiter(s) (100 mL/Hr) IV Continuous <Continuous>  dextrose 50% Injectable 50 milliLiter(s) IV Push every 15 minutes  dextrose 50% Injectable 25 milliLiter(s) IV Push every 15 minutes  famotidine    Tablet 20 milliGRAM(s) Oral every 12 hours  fluticasone propionate/ salmeterol 100-50 MICROgram(s) Diskus 1 Dose(s) Inhalation two times a day  folic acid 1 milliGRAM(s) Oral daily  furosemide   Injectable 40 milliGRAM(s) IV Push daily  glucagon  Injectable 1 milliGRAM(s) IntraMuscular once  heparin   Injectable 5000 Unit(s) SubCutaneous every 12 hours  insulin lispro (ADMELOG) corrective regimen sliding scale   SubCutaneous three times a day before meals  insulin lispro (ADMELOG) corrective regimen sliding scale   SubCutaneous at bedtime  lidocaine   4% Patch 1 Patch Transdermal daily  melatonin 5 milliGRAM(s) Oral at bedtime  metolazone 5 milliGRAM(s) Oral daily  metoprolol tartrate 37.5 milliGRAM(s) Oral every 8 hours  polyethylene glycol 3350 17 Gram(s) Oral daily  potassium chloride    Tablet ER 20 milliEquivalent(s) Oral once  potassium chloride    Tablet ER 40 milliEquivalent(s) Oral daily  rosuvastatin 20 milliGRAM(s) Oral at bedtime  senna 2 Tablet(s) Oral at bedtime  valsartan 160 milliGRAM(s) Oral daily    MEDICATIONS  (PRN):  acetaminophen     Tablet .. 650 milliGRAM(s) Oral every 6 hours PRN Temp greater or equal to 38C (100.4F), Mild Pain (1 - 3)  dextrose Oral Gel 15 Gram(s) Oral once PRN Blood Glucose LESS THAN 70 milliGRAM(s)/deciliter  hydrALAZINE Injectable 10 milliGRAM(s) IV Push every 1 hour PRN SBP>150  labetalol Injectable 10 milliGRAM(s) IV Push every 2 hours PRN Diastolic blood pressure > 150    Assessment/Plan:  59y Male POD #6 s/p CABGx4     - Case and plan discussed with CTU Intensivist and CT Surgeon - Dr. Martin  - Continue CTU supportive care and ongoing plan of care as per continuing CTU rounds.   - Continue DVT/GI prophylaxis  - Incentive Spirometry 10 times an hour  - Continue to advance physical activity as tolerated and continue PT/OT as directed  1. CAD s/p CABG: Continue ASA, statin, and BB. Pain control prn  2. Anemia, 2/2 acute blood loss: Monitor H/H. Start venofer and folic acid.   3. HTN- cont Lopressor and Norvasc  4. Post-op A. Fib ppx: monitor electrolytes, converted to sr with amio and procan  5. COPD/Hypoxia: Continue high flow O2, duoneb q6hr, advair. Keep O2sat >92%. Encourage incentive spirometry. lasix for noncardiogenic fluid overload   dvt/gi ppx    Social Service Disposition:  Anticipate d/c home once off of oxygen OPERATIVE PROCEDURE(s):                POD #6 s/p CABGx4                       SURGEON(s): YUDELKA Martin  SUBJECTIVE ASSESSMENT:59yMale patient seen and examined at bedside.    Vital Signs Last 24 Hrs  T(F): 98.7 (25 Feb 2025 08:00), Max: 99 (24 Feb 2025 16:00)  HR: 77 (25 Feb 2025 07:00) (77 - 119)  BP: 89/50 (25 Feb 2025 07:00) (89/50 - 125/65)  BP(mean): 64 (25 Feb 2025 07:00) (64 - 93)  ABP: 0/0 (24 Feb 2025 09:00) (0/0 - 0/0)  ABP(mean): 0 (24 Feb 2025 09:00)  RR: 27 (25 Feb 2025 07:00) (18 - 35)  SpO2: 89% (25 Feb 2025 07:00) (89% - 98%)  CVP(mm Hg): --  CVP(cm H2O): --  CO: --  CI: --  PA: --  SVR: --    I&O's Detail    24 Feb 2025 07:01  -  25 Feb 2025 07:00  --------------------------------------------------------  IN:    Amiodarone: 799.2 mL    Oral Fluid: 420 mL  Total IN: 1219.2 mL    OUT:    Voided (mL): 1975 mL  Total OUT: 1975 mL        Net: I&O's Detail    23 Feb 2025 07:01  -  24 Feb 2025 07:00  --------------------------------------------------------  Total NET: -1612 mL      24 Feb 2025 07:01  -  25 Feb 2025 07:00  --------------------------------------------------------  Total NET: -755.8 mL        CAPILLARY BLOOD GLUCOSE      POCT Blood Glucose.: 139 mg/dL (25 Feb 2025 06:30)  POCT Blood Glucose.: 117 mg/dL (24 Feb 2025 20:42)  POCT Blood Glucose.: 141 mg/dL (24 Feb 2025 18:27)  POCT Blood Glucose.: 117 mg/dL (24 Feb 2025 12:33)      Physical Exam:  General: NAD; A&Ox3  Cardiac: S1/S2, RRR, no murmur, no rubs  Lungs: unlabored respirations, CTA b/l, no wheeze, no rales, no crackles  Abdomen: Soft/NT/ND; positive bowel sounds x 4  Sternum: Intact, no click, incision healing well with no drainage  Incisions: Incisions clean/dry/intact  Extremities: No edema b/l lower extremities; good capillary refill; no cyanosis; palpable 1+ pedal pulses b/l      LABS:                        8.6[L]  8.89  )-----------( 170      ( 25 Feb 2025 02:19 )             25.9[L]                        8.7[L]  7.35  )-----------( 144      ( 24 Feb 2025 01:24 )             26.3[L]    02-25    135  |  96[L]  |  27[H]  ----------------------------<  120[H]  3.6   |  30  |  1.2  02-24    134[L]  |  97[L]  |  28[H]  ----------------------------<  119[H]  4.1   |  27  |  1.0    Ca    7.7[L]      25 Feb 2025 04:25  Mg     2.1     02-25    TPro  5.6[L] [6.0 - 8.0]  /  Alb  3.4[L] [3.5 - 5.2]  /  TBili  0.7 [0.2 - 1.2]  /  DBili  x   /  AST  105[H] [0 - 41]  /  ALT  84[H] [0 - 41]  /  AlkPhos  47 [30 - 115]  02-25    Urinalysis Basic - ( 25 Feb 2025 04:25 )    Color: x / Appearance: x / SG: x / pH: x  Gluc: 120 mg/dL / Ketone: x  / Bili: x / Urobili: x   Blood: x / Protein: x / Nitrite: x   Leuk Esterase: x / RBC: x / WBC x   Sq Epi: x / Non Sq Epi: x / Bacteria: x      ABG - ( 24 Feb 2025 03:57 )  pH: 7.51  /  pCO2: 38    /  pO2: 73    / HCO3: 30    / Base Excess: 6.9   /  SaO2: 98.9  /  LA: 1.0      RADIOLOGY & ADDITIONAL TESTS:  CXR: ACC: [id number] EXAM:  XR CHEST PORTABLE ROUTINE 1V   ORDERED BY: CHANDLER DAVIS     PROCEDURE DATE:  02/25/2025      INTERPRETATION:  CLINICAL HISTORY: Shortness of breath.     Frontal view chest    Comparison: Chest x-rayFebruary 24, 2025  Findings/  impression:    EKG leads overlie thorax, obscuring anatomy.    Support devices: None    Cardiac/ Mediastinum: Stable cardiomegaly. Poststernotomy    Lungs/ Pleura: Stable basilar opacities. No pneumothorax    Skeletal/ soft tissues: Stable  EKG: Intermittent A. fib with SR  Allergies    No Known Allergies    Intolerances    MEDICATIONS  (STANDING):  albuterol/ipratropium for Nebulization 3 milliLiter(s) Nebulizer every 6 hours  aMIOdarone Infusion 1 mG/Min (33.3 mL/Hr) IV Continuous <Continuous>  amLODIPine   Tablet 5 milliGRAM(s) Oral daily  ascorbic acid 500 milliGRAM(s) Oral daily  aspirin enteric coated 81 milliGRAM(s) Oral daily  bacitracin   Ointment 1 Application(s) Topical every 12 hours  bisacodyl 5 milliGRAM(s) Oral every 12 hours  bisacodyl Suppository 10 milliGRAM(s) Rectal once  chlorhexidine 2% Cloths 1 Application(s) Topical daily  clopidogrel Tablet 75 milliGRAM(s) Oral daily  dextrose 5%. 1000 milliLiter(s) (50 mL/Hr) IV Continuous <Continuous>  dextrose 5%. 1000 milliLiter(s) (100 mL/Hr) IV Continuous <Continuous>  dextrose 50% Injectable 50 milliLiter(s) IV Push every 15 minutes  dextrose 50% Injectable 25 milliLiter(s) IV Push every 15 minutes  famotidine    Tablet 20 milliGRAM(s) Oral every 12 hours  fluticasone propionate/ salmeterol 100-50 MICROgram(s) Diskus 1 Dose(s) Inhalation two times a day  folic acid 1 milliGRAM(s) Oral daily  furosemide   Injectable 40 milliGRAM(s) IV Push daily  glucagon  Injectable 1 milliGRAM(s) IntraMuscular once  heparin   Injectable 5000 Unit(s) SubCutaneous every 12 hours  insulin lispro (ADMELOG) corrective regimen sliding scale   SubCutaneous three times a day before meals  insulin lispro (ADMELOG) corrective regimen sliding scale   SubCutaneous at bedtime  lidocaine   4% Patch 1 Patch Transdermal daily  melatonin 5 milliGRAM(s) Oral at bedtime  metolazone 5 milliGRAM(s) Oral daily  metoprolol tartrate 37.5 milliGRAM(s) Oral every 8 hours  polyethylene glycol 3350 17 Gram(s) Oral daily  potassium chloride    Tablet ER 20 milliEquivalent(s) Oral once  potassium chloride    Tablet ER 40 milliEquivalent(s) Oral daily  rosuvastatin 20 milliGRAM(s) Oral at bedtime  senna 2 Tablet(s) Oral at bedtime  valsartan 160 milliGRAM(s) Oral daily    MEDICATIONS  (PRN):  acetaminophen     Tablet .. 650 milliGRAM(s) Oral every 6 hours PRN Temp greater or equal to 38C (100.4F), Mild Pain (1 - 3)  dextrose Oral Gel 15 Gram(s) Oral once PRN Blood Glucose LESS THAN 70 milliGRAM(s)/deciliter  hydrALAZINE Injectable 10 milliGRAM(s) IV Push every 1 hour PRN SBP>150  labetalol Injectable 10 milliGRAM(s) IV Push every 2 hours PRN Diastolic blood pressure > 150    Assessment/Plan:  59y Male POD #6 s/p CABGx4     - Case and plan discussed with CTU Intensivist and CT Surgeon - Dr. Martin  - Continue CTU supportive care and ongoing plan of care as per continuing CTU rounds.   - Continue DVT/GI prophylaxis  - Incentive Spirometry 10 times an hour  - Continue to advance physical activity as tolerated and continue PT/OT as directed  1. CAD s/p CABG: Continue ASA, and BB. Pain control prn; STATIN held again due to inc lft's  2. Anemia, 2/2 acute blood loss: Monitor H/H. Start venofer and folic acid.   3. HTN- cont Norvasc and inc dose of Lopressor for better rate control  4. Post-op A. Fib ppx: monitor electrolytes, converted to sr with amio and procan  5. COPD/Hypoxia: Continue high flow O2, duoneb q6hr, advair. Keep O2sat >92%. Encourage incentive spirometry. lasix for noncardiogenic fluid overload   dvt/gi ppx    Social Service Disposition:  Anticipate d/c home once off of oxygen

## 2025-02-26 LAB
ALBUMIN SERPL ELPH-MCNC: 3 G/DL — LOW (ref 3.5–5.2)
ALP SERPL-CCNC: 45 U/L — SIGNIFICANT CHANGE UP (ref 30–115)
ALT FLD-CCNC: 114 U/L — HIGH (ref 0–41)
ANION GAP SERPL CALC-SCNC: 8 MMOL/L — SIGNIFICANT CHANGE UP (ref 7–14)
AST SERPL-CCNC: 109 U/L — HIGH (ref 0–41)
BASOPHILS # BLD AUTO: 0.04 K/UL — SIGNIFICANT CHANGE UP (ref 0–0.2)
BASOPHILS NFR BLD AUTO: 0.3 % — SIGNIFICANT CHANGE UP (ref 0–1)
BILIRUB SERPL-MCNC: 0.6 MG/DL — SIGNIFICANT CHANGE UP (ref 0.2–1.2)
BUN SERPL-MCNC: 40 MG/DL — HIGH (ref 10–20)
CALCIUM SERPL-MCNC: 7.8 MG/DL — LOW (ref 8.4–10.5)
CHLORIDE SERPL-SCNC: 94 MMOL/L — LOW (ref 98–110)
CO2 SERPL-SCNC: 29 MMOL/L — SIGNIFICANT CHANGE UP (ref 17–32)
CREAT SERPL-MCNC: 1.4 MG/DL — SIGNIFICANT CHANGE UP (ref 0.7–1.5)
EGFR: 57 ML/MIN/1.73M2 — LOW
EGFR: 57 ML/MIN/1.73M2 — LOW
EOSINOPHIL # BLD AUTO: 0.24 K/UL — SIGNIFICANT CHANGE UP (ref 0–0.7)
EOSINOPHIL NFR BLD AUTO: 2 % — SIGNIFICANT CHANGE UP (ref 0–8)
GLUCOSE BLDC GLUCOMTR-MCNC: 126 MG/DL — HIGH (ref 70–99)
GLUCOSE BLDC GLUCOMTR-MCNC: 142 MG/DL — HIGH (ref 70–99)
GLUCOSE BLDC GLUCOMTR-MCNC: 150 MG/DL — HIGH (ref 70–99)
GLUCOSE SERPL-MCNC: 114 MG/DL — HIGH (ref 70–99)
HCT VFR BLD CALC: 25.1 % — LOW (ref 42–52)
HGB BLD-MCNC: 8.3 G/DL — LOW (ref 14–18)
IMM GRANULOCYTES NFR BLD AUTO: 1.8 % — HIGH (ref 0.1–0.3)
LYMPHOCYTES # BLD AUTO: 1.46 K/UL — SIGNIFICANT CHANGE UP (ref 1.2–3.4)
LYMPHOCYTES # BLD AUTO: 12 % — LOW (ref 20.5–51.1)
MAGNESIUM SERPL-MCNC: 2.8 MG/DL — HIGH (ref 1.8–2.4)
MCHC RBC-ENTMCNC: 30.3 PG — SIGNIFICANT CHANGE UP (ref 27–31)
MCHC RBC-ENTMCNC: 33.1 G/DL — SIGNIFICANT CHANGE UP (ref 32–37)
MCV RBC AUTO: 91.6 FL — SIGNIFICANT CHANGE UP (ref 80–94)
MONOCYTES # BLD AUTO: 1.59 K/UL — HIGH (ref 0.1–0.6)
MONOCYTES NFR BLD AUTO: 13 % — HIGH (ref 1.7–9.3)
NEUTROPHILS # BLD AUTO: 8.64 K/UL — HIGH (ref 1.4–6.5)
NEUTROPHILS NFR BLD AUTO: 70.9 % — SIGNIFICANT CHANGE UP (ref 42.2–75.2)
NRBC BLD AUTO-RTO: 0 /100 WBCS — SIGNIFICANT CHANGE UP (ref 0–0)
PLATELET # BLD AUTO: 213 K/UL — SIGNIFICANT CHANGE UP (ref 130–400)
PMV BLD: 10.5 FL — HIGH (ref 7.4–10.4)
POTASSIUM SERPL-MCNC: 4.3 MMOL/L — SIGNIFICANT CHANGE UP (ref 3.5–5)
POTASSIUM SERPL-SCNC: 4.3 MMOL/L — SIGNIFICANT CHANGE UP (ref 3.5–5)
PROT SERPL-MCNC: 5.7 G/DL — LOW (ref 6–8)
RBC # BLD: 2.74 M/UL — LOW (ref 4.7–6.1)
RBC # FLD: 15.8 % — HIGH (ref 11.5–14.5)
SODIUM SERPL-SCNC: 131 MMOL/L — LOW (ref 135–146)
WBC # BLD: 12.19 K/UL — HIGH (ref 4.8–10.8)
WBC # FLD AUTO: 12.19 K/UL — HIGH (ref 4.8–10.8)

## 2025-02-26 PROCEDURE — 93010 ELECTROCARDIOGRAM REPORT: CPT

## 2025-02-26 PROCEDURE — 71045 X-RAY EXAM CHEST 1 VIEW: CPT | Mod: 26

## 2025-02-26 PROCEDURE — 99291 CRITICAL CARE FIRST HOUR: CPT | Mod: 24

## 2025-02-26 RX ORDER — OXYCODONE HYDROCHLORIDE 30 MG/1
10 TABLET ORAL EVERY 6 HOURS
Refills: 0 | Status: DISCONTINUED | OUTPATIENT
Start: 2025-02-26 | End: 2025-03-02

## 2025-02-26 RX ORDER — FUROSEMIDE 10 MG/ML
20 INJECTION INTRAMUSCULAR; INTRAVENOUS ONCE
Refills: 0 | Status: COMPLETED | OUTPATIENT
Start: 2025-02-26 | End: 2025-02-26

## 2025-02-26 RX ORDER — GABAPENTIN 400 MG/1
100 CAPSULE ORAL EVERY 8 HOURS
Refills: 0 | Status: DISCONTINUED | OUTPATIENT
Start: 2025-02-26 | End: 2025-03-03

## 2025-02-26 RX ORDER — ALBUMIN (HUMAN) 12.5 G/50ML
250 INJECTION, SOLUTION INTRAVENOUS ONCE
Refills: 0 | Status: COMPLETED | OUTPATIENT
Start: 2025-02-26 | End: 2025-02-26

## 2025-02-26 RX ORDER — OXYCODONE HYDROCHLORIDE 30 MG/1
5 TABLET ORAL EVERY 4 HOURS
Refills: 0 | Status: DISCONTINUED | OUTPATIENT
Start: 2025-02-26 | End: 2025-03-03

## 2025-02-26 RX ORDER — METOPROLOL SUCCINATE 50 MG/1
25 TABLET, EXTENDED RELEASE ORAL EVERY 8 HOURS
Refills: 0 | Status: DISCONTINUED | OUTPATIENT
Start: 2025-02-26 | End: 2025-03-03

## 2025-02-26 RX ADMIN — Medication 1 APPLICATION(S): at 11:54

## 2025-02-26 RX ADMIN — IPRATROPIUM BROMIDE AND ALBUTEROL SULFATE 3 MILLILITER(S): .5; 2.5 SOLUTION RESPIRATORY (INHALATION) at 07:41

## 2025-02-26 RX ADMIN — Medication 1 APPLICATION(S): at 18:41

## 2025-02-26 RX ADMIN — Medication 5 MILLIGRAM(S): at 21:54

## 2025-02-26 RX ADMIN — AMIODARONE HYDROCHLORIDE 400 MILLIGRAM(S): 50 INJECTION, SOLUTION INTRAVENOUS at 21:54

## 2025-02-26 RX ADMIN — HEPARIN SODIUM 5000 UNIT(S): 1000 INJECTION INTRAVENOUS; SUBCUTANEOUS at 18:39

## 2025-02-26 RX ADMIN — OXYCODONE HYDROCHLORIDE 10 MILLIGRAM(S): 30 TABLET ORAL at 10:18

## 2025-02-26 RX ADMIN — METOPROLOL SUCCINATE 50 MILLIGRAM(S): 50 TABLET, EXTENDED RELEASE ORAL at 11:53

## 2025-02-26 RX ADMIN — LIDOCAINE HYDROCHLORIDE 1 PATCH: 20 JELLY TOPICAL at 11:54

## 2025-02-26 RX ADMIN — OXYCODONE HYDROCHLORIDE 10 MILLIGRAM(S): 30 TABLET ORAL at 21:53

## 2025-02-26 RX ADMIN — LIDOCAINE HYDROCHLORIDE 1 PATCH: 20 JELLY TOPICAL at 19:00

## 2025-02-26 RX ADMIN — LIDOCAINE HYDROCHLORIDE 1 PATCH: 20 JELLY TOPICAL at 22:01

## 2025-02-26 RX ADMIN — Medication 500 MILLIGRAM(S): at 11:53

## 2025-02-26 RX ADMIN — HEPARIN SODIUM 5000 UNIT(S): 1000 INJECTION INTRAVENOUS; SUBCUTANEOUS at 05:13

## 2025-02-26 RX ADMIN — IPRATROPIUM BROMIDE AND ALBUTEROL SULFATE 3 MILLILITER(S): .5; 2.5 SOLUTION RESPIRATORY (INHALATION) at 02:10

## 2025-02-26 RX ADMIN — Medication 20 MILLIGRAM(S): at 18:39

## 2025-02-26 RX ADMIN — METOPROLOL SUCCINATE 25 MILLIGRAM(S): 50 TABLET, EXTENDED RELEASE ORAL at 21:54

## 2025-02-26 RX ADMIN — OXYCODONE HYDROCHLORIDE 10 MILLIGRAM(S): 30 TABLET ORAL at 22:44

## 2025-02-26 RX ADMIN — GABAPENTIN 100 MILLIGRAM(S): 400 CAPSULE ORAL at 21:54

## 2025-02-26 RX ADMIN — GABAPENTIN 100 MILLIGRAM(S): 400 CAPSULE ORAL at 12:52

## 2025-02-26 RX ADMIN — INSULIN LISPRO 0: 100 INJECTION, SOLUTION INTRAVENOUS; SUBCUTANEOUS at 22:00

## 2025-02-26 RX ADMIN — Medication 1 APPLICATION(S): at 05:14

## 2025-02-26 RX ADMIN — OXYCODONE HYDROCHLORIDE 10 MILLIGRAM(S): 30 TABLET ORAL at 10:00

## 2025-02-26 RX ADMIN — Medication 20 MILLIGRAM(S): at 05:14

## 2025-02-26 RX ADMIN — ALBUMIN (HUMAN) 125 MILLILITER(S): 12.5 INJECTION, SOLUTION INTRAVENOUS at 14:28

## 2025-02-26 RX ADMIN — FOLIC ACID 1 MILLIGRAM(S): 1 TABLET ORAL at 11:53

## 2025-02-26 RX ADMIN — FUROSEMIDE 20 MILLIGRAM(S): 10 INJECTION INTRAMUSCULAR; INTRAVENOUS at 12:52

## 2025-02-26 RX ADMIN — CLOPIDOGREL BISULFATE 75 MILLIGRAM(S): 75 TABLET, FILM COATED ORAL at 11:53

## 2025-02-26 RX ADMIN — Medication 1 DOSE(S): at 08:03

## 2025-02-26 RX ADMIN — AMIODARONE HYDROCHLORIDE 400 MILLIGRAM(S): 50 INJECTION, SOLUTION INTRAVENOUS at 05:14

## 2025-02-26 RX ADMIN — Medication 81 MILLIGRAM(S): at 11:53

## 2025-02-26 NOTE — PROGRESS NOTE ADULT - SUBJECTIVE AND OBJECTIVE BOX
CTU Attending Progress Daily Note    59M w/ htn, hl, aaa, obesity, nephrolithiasis, gerd, cholecystitis (s/p lap abdulaziz), obesity, former smoker s/p CABG x4 2/19 2/22-25 multiple episodes of afib. given amio, procainamide, metoprolol, digoxin  2/25 furosemide d/c due to creatnine bump    OBJECTIVE:  ICU Vital Signs Last 24 Hrs  T(C): 37.1 (26 Feb 2025 08:00), Max: 37.1 (25 Feb 2025 16:00)  T(F): 98.7 (26 Feb 2025 08:00), Max: 98.7 (25 Feb 2025 16:00)  HR: 90 (26 Feb 2025 08:00) (73 - 110)  BP: 98/54 (26 Feb 2025 08:00) (80/51 - 134/56)  BP(mean): 69 (26 Feb 2025 08:00) (61 - 80)  ABP: --  ABP(mean): --  RR: 26 (26 Feb 2025 08:00) (21 - 31)  SpO2: 92% (26 Feb 2025 08:00) (88% - 96%)    O2 Parameters below as of 26 Feb 2025 09:00  Patient On (Oxygen Delivery Method): nasal cannula  O2 Flow (L/min): 2        I&O's Summary    25 Feb 2025 07:01  -  26 Feb 2025 07:00  --------------------------------------------------------  IN: 1017.3 mL / OUT: 700 mL / NET: 317.3 mL    26 Feb 2025 07:01  -  26 Feb 2025 09:34  --------------------------------------------------------  IN: 0 mL / OUT: 175 mL / NET: -175 mL      I&O's Detail    25 Feb 2025 07:01  -  26 Feb 2025 07:00  --------------------------------------------------------  IN:    Amiodarone: 33.3 mL    Amiodarone: 334 mL    IV PiggyBack: 350 mL    Oral Fluid: 300 mL  Total IN: 1017.3 mL    OUT:    Voided (mL): 700 mL  Total OUT: 700 mL    Total NET: 317.3 mL      26 Feb 2025 07:01  -  26 Feb 2025 09:34  --------------------------------------------------------  IN:  Total IN: 0 mL    OUT:    Voided (mL): 175 mL  Total OUT: 175 mL    Total NET: -175 mL        Adult Advanced Hemodynamics Last 24 Hrs  CVP(mm Hg): --  CVP(cm H2O): --  CO: --  CI: --  PA: --  PA(mean): --  PCWP: --  SVR: --  SVRI: --  PVR: --  PVRI: --    CAPILLARY BLOOD GLUCOSE      POCT Blood Glucose.: 126 mg/dL (26 Feb 2025 06:49)  POCT Blood Glucose.: 134 mg/dL (25 Feb 2025 21:28)  POCT Blood Glucose.: 128 mg/dL (25 Feb 2025 17:31)  POCT Blood Glucose.: 157 mg/dL (25 Feb 2025 11:45)    LABS:                          8.3    12.19 )-----------( 213      ( 26 Feb 2025 01:40 )             25.1     02-26    131[L]  |  94[L]  |  40[H]  ----------------------------<  114[H]  4.3   |  29  |  1.4    Ca    7.8[L]      26 Feb 2025 01:40  Mg     2.8     02-26    TPro  5.7[L]  /  Alb  3.0[L]  /  TBili  0.6  /  DBili  x   /  AST  109[H]  /  ALT  114[H]  /  AlkPhos  45  02-26      Urinalysis Basic - ( 26 Feb 2025 01:40 )    Color: x / Appearance: x / SG: x / pH: x  Gluc: 114 mg/dL / Ketone: x  / Bili: x / Urobili: x   Blood: x / Protein: x / Nitrite: x   Leuk Esterase: x / RBC: x / WBC x   Sq Epi: x / Non Sq Epi: x / Bacteria: x        Home Medications:  amLODIPine 5 mg oral tablet: 1 tab(s) orally 2 times a day (19 Feb 2025 06:54)  aspirin 81 mg oral tablet: orally once a day (19 Feb 2025 06:54)  clopidogrel 75 mg oral tablet: 1 tab(s) orally once a day (19 Feb 2025 06:54)  MELOXICAM 15 MG TABLET:  (19 Feb 2025 06:54)  metoprolol succinate 50 mg oral capsule, extended release: 1 cap(s) orally once a day (at bedtime) (19 Feb 2025 06:54)  omeprazole 40 mg oral delayed release capsule: 1 cap(s) orally once a day (19 Feb 2025 06:54)  rosuvastatin 20 mg oral tablet: 1 tab(s) orally once a day (at bedtime) (19 Feb 2025 06:54)  valsartan 160 mg oral tablet: 1 tab(s) orally once a day (19 Feb 2025 06:54)    HOSPITAL MEDICATIONS:  MEDICATIONS  (STANDING):  aMIOdarone    Tablet 400 milliGRAM(s) Oral every 8 hours  aMIOdarone    Tablet   Oral   ascorbic acid 500 milliGRAM(s) Oral daily  aspirin enteric coated 81 milliGRAM(s) Oral daily  bacitracin   Ointment 1 Application(s) Topical every 12 hours  bisacodyl 5 milliGRAM(s) Oral every 12 hours  bisacodyl Suppository 10 milliGRAM(s) Rectal once  chlorhexidine 2% Cloths 1 Application(s) Topical daily  clopidogrel Tablet 75 milliGRAM(s) Oral daily  dextrose 5%. 1000 milliLiter(s) (50 mL/Hr) IV Continuous <Continuous>  dextrose 5%. 1000 milliLiter(s) (100 mL/Hr) IV Continuous <Continuous>  dextrose 50% Injectable 50 milliLiter(s) IV Push every 15 minutes  dextrose 50% Injectable 25 milliLiter(s) IV Push every 15 minutes  famotidine    Tablet 20 milliGRAM(s) Oral every 12 hours  fluticasone propionate/ salmeterol 100-50 MICROgram(s) Diskus 1 Dose(s) Inhalation two times a day  folic acid 1 milliGRAM(s) Oral daily  furosemide   Injectable 20 milliGRAM(s) IV Push once  gabapentin 100 milliGRAM(s) Oral every 8 hours  glucagon  Injectable 1 milliGRAM(s) IntraMuscular once  heparin   Injectable 5000 Unit(s) SubCutaneous every 12 hours  insulin lispro (ADMELOG) corrective regimen sliding scale   SubCutaneous three times a day before meals  insulin lispro (ADMELOG) corrective regimen sliding scale   SubCutaneous at bedtime  lidocaine   4% Patch 1 Patch Transdermal daily  melatonin 5 milliGRAM(s) Oral at bedtime  metoprolol tartrate 50 milliGRAM(s) Oral every 8 hours    MEDICATIONS  (PRN):  dextrose Oral Gel 15 Gram(s) Oral once PRN Blood Glucose LESS THAN 70 milliGRAM(s)/deciliter  hydrALAZINE Injectable 10 milliGRAM(s) IV Push every 1 hour PRN SBP>150  ipratropium    for Nebulization 500 MICROGram(s) Nebulizer every 6 hours PRN Shortness of Breath  labetalol Injectable 10 milliGRAM(s) IV Push every 2 hours PRN Diastolic blood pressure > 150  ondansetron Injectable 4 milliGRAM(s) IV Push every 6 hours PRN Nausea and/or Vomiting  oxyCODONE    IR 10 milliGRAM(s) Oral every 6 hours PRN Severe Pain (7 - 10)  oxyCODONE    IR 5 milliGRAM(s) Oral every 4 hours PRN Moderate Pain (4 - 6)    RADIOLOGY:  Chest X-ray Reviewed    General appearance - wake and alert  Heart - regular rate and rhythm, no ectopy  Pulm - bilateral chest rise.  Abdomen - soft, non-distended  EXTREMITIES: warm. good capillary refill. No cyanosis. No erythema  Neurological - awake and alert, moves all extremities

## 2025-02-26 NOTE — PROGRESS NOTE ADULT - ASSESSMENT
59M w/ htn, hl, aaa, obesity, nephrolithiasis, gerd, cholecystitis (s/p lap abdulaziz), obesity, former smoker s/p CABG x4 2/19     Assessment/Plan    Neuro:  #acute postoperative pain  - lidocaine patch. increase opioid. start gabapentin.  #at risk for postoperative/ICU delirium  - frequent re-orientation, good sleep hygiene, avoid medications which can increase delirium risk  - CAM-ICU qShift    CV: cad s/p cabg  - aspirin and beta blocker  - hold statin for LFT elevation  #afib  - in and out of afib multiple time last few days. continue oral amio but if LFTs continue to rise, will have to decrease/discontinue. continue beta blocker.    Pulm:  #acute postoperative pulmonary insufficiency  #atelectasis  - aggressive incentive spirometry and chest PT to address atelectasis  #acute pulmonary edema  - restart gentle diuresis    Renal/Fluid/Lytes:  #acute fluid overload  - gentle diuresis  - replete/optimize electrolytes  - preload and after optimization    Heme:  #Acute blood loss anemia from surgery  - transfuse for hemoglobin < 7 or hemodynamic instability due to anemia  #afib  - if patient continues to have episodes of afib, will need to consider starting full anticoagulation    ID:  #at risk for surgical infection  - no signs of acute infection    Endocrine:   #acute postoperative stress hyperglycemia  - insulin therapy to achieve tight glucose control    GI:  #at risk for malnutrition  - enteral diet  #at risk for post-operative ileus and opioid-induced constipation  - bowel regimen    Prophylaxis  #at risk of VTE  - SCDs. VTE chemoprophylaxis  - GI prophylaxis per cardiac surgery    PT  - out of bed to chair  - ambulation as much as possible    This patient is critically ill and required my dedicated time to evaluate, manage and maintain or prevent deterioration of the organ systems and problems noted above and provide documentation.    Due to a high probability of clinically significant, life threatening deterioration due to high risk of   cardiac failure, circulatory failure, arrhythmias, acute pulmonary insufficiency, the patient required my highest level of preparedness to intervene emergently and I personally spent this critical care time directly and personally managing the patient. This critical care time included obtaining a history when possible; examining the patient; review pulse oximetry; order / interpreting / review of laboratory and radiology studies with immediate assessment and treatment as needed; directing the titration of pressors, oxygen support devices, fluid resuscitation, interpretation of cardiac output measurements; interpretation of EKG / rhythm strips / telemetry; arranging urgent treatment with development of a management plan; evaluation of patient's response to treatment; frequent reassessment and monitor for potential decompensation; and discussion with other providers/consultants.  This critical care time was performed to assess and manage the high probability of imminent life threatening deterioration that could result in organ(s) failure. It was exclusive of separately billable procedures and treating other patients and teaching time. please see MDM (medical decision making) / Assessment / Plans sections and the rest of the note for further information on patient assessment and treatment.    Time I spent with family or surrogate(s) is included only if the patient was incapable of providing the necessary information or participating in medical decision making. Time devoted to teaching and to any procedures I billed separately is not included.     Management of this patient was discussed with the surgical attending / cardiologist and mid-level providers.    A central line, if present, continues to be necessary for infusion of medications and IV fluids. It is to be removed when other adequate venous access is accomplished.    A restraint, if present, remains because the patient cannot be safely managed without restraints as potential for harm secondary to inadvertent movement and loss of tubes/lines outweighs the burden of restraint.    A silvestre catheter, if present, remains necessary to monitor urine output continuously, and/or divert urine from the skin. It will be removed per policy when it is not needed for these purposes.    I acknowledge the use of copied documentation.  All copy forward documentation is my own and has been reviewed and revised as appropriate.  If no changes were made, it is because that information remains the same.    Trace Caicedo MD  Critical Care Medicine

## 2025-02-26 NOTE — PROGRESS NOTE ADULT - SUBJECTIVE AND OBJECTIVE BOX
OPERATIVE PROCEDURE(s):                POD #                       59yMale  SURGEON(s): YUDELKA Martin  SUBJECTIVE ASSESSMENT:   Vital Signs Last 24 Hrs  T(F): 98.7 (26 Feb 2025 08:00), Max: 98.7 (25 Feb 2025 16:00)  HR: 90 (26 Feb 2025 08:00) (73 - 110)  BP: 98/54 (26 Feb 2025 08:00) (80/51 - 134/56)  BP(mean): 69 (26 Feb 2025 08:00) (61 - 80)  RR: 26 (26 Feb 2025 08:00) (21 - 31)  SpO2: 92% (26 Feb 2025 08:00) (88% - 96%)    I&O's Detail    25 Feb 2025 07:01  -  26 Feb 2025 07:00  --------------------------------------------------------  IN:    Amiodarone: 33.3 mL    Amiodarone: 334 mL    IV PiggyBack: 350 mL    Oral Fluid: 300 mL  Total IN: 1017.3 mL    OUT:    Voided (mL): 700 mL  Total OUT: 700 mL        Net:   I&O's Detail    24 Feb 2025 07:01  -  25 Feb 2025 07:00  --------------------------------------------------------  Total NET: -755.8 mL      25 Feb 2025 07:01  -  26 Feb 2025 07:00  --------------------------------------------------------  Total NET: 317.3 mL        CAPILLARY BLOOD GLUCOSE      POCT Blood Glucose.: 126 mg/dL (26 Feb 2025 06:49)  POCT Blood Glucose.: 134 mg/dL (25 Feb 2025 21:28)  POCT Blood Glucose.: 128 mg/dL (25 Feb 2025 17:31)  POCT Blood Glucose.: 157 mg/dL (25 Feb 2025 11:45)    Physical Exam:  General: NAD; A&Ox3/Patient is intubated and sedated  Cardiac: S1/S2, RRR, no murmur, no rubs  Lungs: unlabored respirations, CTA b/l, no wheeze, no rales, no crackles  Abdomen: Soft/NT/ND; positive bowel sounds x 4  Sternum: Intact, no click, incision healing well with no drainage  Incisions: Incisions clean/dry/intact  Extremities: No edema b/l lower extremities; good capillary refill; no cyanosis; palpable 1+ pedal pulses b/l    Central Venous Catheter: Yes[]  No[] , If Yes indication:           Day #  [place] Catheter: Yes  [] , No  [] , If yes indication:                      Day #  NGT: Yes [] No [] ,    If Yes Placement:                                     Day #  EPICARDIAL WIRES:  [] YES [] NO                                              Day #  BOWEL MOVEMENT:  [] YES [] NO, If No, Timing since last BM:      Day #  CHEST TUBE(Left/Right):  [] YES [] NO, If yes -  AIR LEAKS:  [] YES [] NO        LABS:                        8.3[L]  12.19[H] )-----------( 213      ( 26 Feb 2025 01:40 )             25.1[L]                        8.6[L]  8.89  )-----------( 170      ( 25 Feb 2025 02:19 )             25.9[L]    02-26    131[L]  |  94[L]  |  40[H]  ----------------------------<  114[H]  4.3   |  29  |  1.4  02-25    131[L]  |  94[L]  |  32[H]  ----------------------------<  125[H]  4.5   |  28  |  1.5    Ca    7.8[L]      26 Feb 2025 01:40  Mg     2.8     02-26    TPro  5.7[L] [6.0 - 8.0]  /  Alb  3.0[L] [3.5 - 5.2]  /  TBili  0.6 [0.2 - 1.2]  /  DBili  x   /  AST  109[H] [0 - 41]  /  ALT  114[H] [0 - 41]  /  AlkPhos  45 [30 - 115]  02-26      Urinalysis Basic - ( 26 Feb 2025 01:40 )    Color: x / Appearance: x / SG: x / pH: x  Gluc: 114 mg/dL / Ketone: x  / Bili: x / Urobili: x   Blood: x / Protein: x / Nitrite: x   Leuk Esterase: x / RBC: x / WBC x   Sq Epi: x / Non Sq Epi: x / Bacteria: x        RADIOLOGY & ADDITIONAL TESTS:  CXR:  EKG:  MEDICATIONS  (STANDING):  albuterol/ipratropium for Nebulization 3 milliLiter(s) Nebulizer every 6 hours  aMIOdarone    Tablet   Oral   aMIOdarone    Tablet 400 milliGRAM(s) Oral every 8 hours  ascorbic acid 500 milliGRAM(s) Oral daily  aspirin enteric coated 81 milliGRAM(s) Oral daily  bacitracin   Ointment 1 Application(s) Topical every 12 hours  bisacodyl 5 milliGRAM(s) Oral every 12 hours  bisacodyl Suppository 10 milliGRAM(s) Rectal once  chlorhexidine 2% Cloths 1 Application(s) Topical daily  clopidogrel Tablet 75 milliGRAM(s) Oral daily  dextrose 5%. 1000 milliLiter(s) (50 mL/Hr) IV Continuous <Continuous>  dextrose 5%. 1000 milliLiter(s) (100 mL/Hr) IV Continuous <Continuous>  dextrose 50% Injectable 50 milliLiter(s) IV Push every 15 minutes  dextrose 50% Injectable 25 milliLiter(s) IV Push every 15 minutes  famotidine    Tablet 20 milliGRAM(s) Oral every 12 hours  fluticasone propionate/ salmeterol 100-50 MICROgram(s) Diskus 1 Dose(s) Inhalation two times a day  folic acid 1 milliGRAM(s) Oral daily  glucagon  Injectable 1 milliGRAM(s) IntraMuscular once  heparin   Injectable 5000 Unit(s) SubCutaneous every 12 hours  insulin lispro (ADMELOG) corrective regimen sliding scale   SubCutaneous three times a day before meals  insulin lispro (ADMELOG) corrective regimen sliding scale   SubCutaneous at bedtime  lidocaine   4% Patch 1 Patch Transdermal daily  melatonin 5 milliGRAM(s) Oral at bedtime  metoprolol tartrate 50 milliGRAM(s) Oral every 8 hours  polyethylene glycol 3350 17 Gram(s) Oral daily  senna 2 Tablet(s) Oral at bedtime    MEDICATIONS  (PRN):  acetaminophen     Tablet .. 650 milliGRAM(s) Oral every 6 hours PRN Temp greater or equal to 38C (100.4F), Mild Pain (1 - 3)  dextrose Oral Gel 15 Gram(s) Oral once PRN Blood Glucose LESS THAN 70 milliGRAM(s)/deciliter  hydrALAZINE Injectable 10 milliGRAM(s) IV Push every 1 hour PRN SBP>150  labetalol Injectable 10 milliGRAM(s) IV Push every 2 hours PRN Diastolic blood pressure > 150  ondansetron Injectable 4 milliGRAM(s) IV Push every 6 hours PRN Nausea and/or Vomiting  oxyCODONE    IR 5 milliGRAM(s) Oral every 4 hours PRN Moderate Pain (4 - 6)    HEPARIN:  [] YES [] NO  Dose: XX UNITS/HR UNITS Q8H  LOVENOX:[] YES [] NO  Dose: XX mg Q24H  COUMADIN: []  YES [] NO  Dose: XX mg  Q24H  SCD's: YES b/l  GI Prophylaxis: Protonix [], Pepcid [], None [], (Contra-indication:.....)    Post-Op Beta-Blockers: Yes [], No[], If No, then contraindication:  Post-Op Aspirin: Yes [],  No [], If No, then contraindication:  Post-Op Statin: Yes [], No[], If No, then contraindication:  Allergies    No Known Allergies    Intolerances      Ambulation/Activity Status:    Assessment/Plan:  59y Male status-post .....  - Case and plan discussed with CTU Intensivist and CT Surgeon - Dr. Martin/Sole/Mnoik  - Continue CTU supportive care    - Continue DVT/GI prophylaxis  - Incentive Spirometry 10 times an hour  - Continue to advance physical activity as tolerated and continue PT/OT as directed  1. CAD: Continue ASA, statin, BB  2. HTN:   3. A. Fib:   4. COPD/Hypoxia:   5. DM/Glucose Control:     Social Service Disposition:     OPERATIVE PROCEDURE(s):  CABGx4              POD #  7                     59yMale  SURGEON(s): YUDELKA Martin  SUBJECTIVE ASSESSMENT: pt seen and examined. no acute complaints at this time   Vital Signs Last 24 Hrs  T(F): 98.7 (26 Feb 2025 08:00), Max: 98.7 (25 Feb 2025 16:00)  HR: 90 (26 Feb 2025 08:00) (73 - 110)  BP: 98/54 (26 Feb 2025 08:00) (80/51 - 134/56)  BP(mean): 69 (26 Feb 2025 08:00) (61 - 80)  RR: 26 (26 Feb 2025 08:00) (21 - 31)  SpO2: 92% (26 Feb 2025 08:00) (88% - 96%)    I&O's Detail    25 Feb 2025 07:01  -  26 Feb 2025 07:00  --------------------------------------------------------  IN:    Amiodarone: 33.3 mL    Amiodarone: 334 mL    IV PiggyBack: 350 mL    Oral Fluid: 300 mL  Total IN: 1017.3 mL    OUT:    Voided (mL): 700 mL  Total OUT: 700 mL        Net:   I&O's Detail    24 Feb 2025 07:01  -  25 Feb 2025 07:00  --------------------------------------------------------  Total NET: -755.8 mL      25 Feb 2025 07:01  -  26 Feb 2025 07:00  --------------------------------------------------------  Total NET: 317.3 mL        CAPILLARY BLOOD GLUCOSE      POCT Blood Glucose.: 126 mg/dL (26 Feb 2025 06:49)  POCT Blood Glucose.: 134 mg/dL (25 Feb 2025 21:28)  POCT Blood Glucose.: 128 mg/dL (25 Feb 2025 17:31)  POCT Blood Glucose.: 157 mg/dL (25 Feb 2025 11:45)    Physical Exam:  General: NAD; A&Ox3, no focal deficits, clear speech   Cardiac: S1/S2, RRR, no murmur, no rubs  Lungs: unlabored respirations, CTA b/l, no wheeze, no rales, no crackles  Abdomen: Soft/NT/ND; positive bowel sounds x 4  Sternum: Intact, no click, incision healing well with no drainage  Incisions: Incisions clean/dry/intact  Extremities: No edema b/l lower extremities; good capillary refill; no cyanosis; palpable 1+ pedal pulses b/l    Central Venous Catheter: Yes[x]  No[] , If Yes indication:    critical       Day #7  EPICARDIAL WIRES:  [x] YES [] NO                                              Day # 7  BOWEL MOVEMENT:  [x] YES [] NO, If No, Timing since last BM:      Day #        LABS:                        8.3[L]  12.19[H] )-----------( 213      ( 26 Feb 2025 01:40 )             25.1[L]                        8.6[L]  8.89  )-----------( 170      ( 25 Feb 2025 02:19 )             25.9[L]    02-26    131[L]  |  94[L]  |  40[H]  ----------------------------<  114[H]  4.3   |  29  |  1.4  02-25    131[L]  |  94[L]  |  32[H]  ----------------------------<  125[H]  4.5   |  28  |  1.5    Ca    7.8[L]      26 Feb 2025 01:40  Mg     2.8     02-26    TPro  5.7[L] [6.0 - 8.0]  /  Alb  3.0[L] [3.5 - 5.2]  /  TBili  0.6 [0.2 - 1.2]  /  DBili  x   /  AST  109[H] [0 - 41]  /  ALT  114[H] [0 - 41]  /  AlkPhos  45 [30 - 115]  02-26      Urinalysis Basic - ( 26 Feb 2025 01:40 )    Color: x / Appearance: x / SG: x / pH: x  Gluc: 114 mg/dL / Ketone: x  / Bili: x / Urobili: x   Blood: x / Protein: x / Nitrite: x   Leuk Esterase: x / RBC: x / WBC x   Sq Epi: x / Non Sq Epi: x / Bacteria: x        RADIOLOGY & ADDITIONAL TESTS:  CXR: xr< from: Xray Chest 1 View- PORTABLE-Routine (Xray Chest 1 View- PORTABLE-Routine in AM.) (02.26.25 @ 06:01) >  IMPRESSION:    Stable exam since one day earlier.    < end of copied text >    EKG: < from: 12 Lead ECG (02.25.25 @ 07:23) >    Ventricular Rate 77 BPM    Atrial Rate 77 BPM    P-R Interval 170 ms    QRS Duration 92 ms    Q-T Interval 466 ms    QTC Calculation(Bazett) 527 ms    P Axis 55 degrees    R Axis 18 degrees    T Axis 66 degrees    Diagnosis Line Normal sinus rhythm  Poor R wave progression  Prolonged QT  Inferior infarct, age undetermined  Abnormal ECG    < end of copied text >    MEDICATIONS  (STANDING):  albuterol/ipratropium for Nebulization 3 milliLiter(s) Nebulizer every 6 hours  aMIOdarone    Tablet   Oral   aMIOdarone    Tablet 400 milliGRAM(s) Oral every 8 hours  ascorbic acid 500 milliGRAM(s) Oral daily  aspirin enteric coated 81 milliGRAM(s) Oral daily  bacitracin   Ointment 1 Application(s) Topical every 12 hours  bisacodyl 5 milliGRAM(s) Oral every 12 hours  bisacodyl Suppository 10 milliGRAM(s) Rectal once  chlorhexidine 2% Cloths 1 Application(s) Topical daily  clopidogrel Tablet 75 milliGRAM(s) Oral daily  dextrose 5%. 1000 milliLiter(s) (50 mL/Hr) IV Continuous <Continuous>  dextrose 5%. 1000 milliLiter(s) (100 mL/Hr) IV Continuous <Continuous>  dextrose 50% Injectable 50 milliLiter(s) IV Push every 15 minutes  dextrose 50% Injectable 25 milliLiter(s) IV Push every 15 minutes  famotidine    Tablet 20 milliGRAM(s) Oral every 12 hours  fluticasone propionate/ salmeterol 100-50 MICROgram(s) Diskus 1 Dose(s) Inhalation two times a day  folic acid 1 milliGRAM(s) Oral daily  glucagon  Injectable 1 milliGRAM(s) IntraMuscular once  heparin   Injectable 5000 Unit(s) SubCutaneous every 12 hours  insulin lispro (ADMELOG) corrective regimen sliding scale   SubCutaneous three times a day before meals  insulin lispro (ADMELOG) corrective regimen sliding scale   SubCutaneous at bedtime  lidocaine   4% Patch 1 Patch Transdermal daily  melatonin 5 milliGRAM(s) Oral at bedtime  metoprolol tartrate 50 milliGRAM(s) Oral every 8 hours  polyethylene glycol 3350 17 Gram(s) Oral daily  senna 2 Tablet(s) Oral at bedtime    MEDICATIONS  (PRN):  acetaminophen     Tablet .. 650 milliGRAM(s) Oral every 6 hours PRN Temp greater or equal to 38C (100.4F), Mild Pain (1 - 3)  dextrose Oral Gel 15 Gram(s) Oral once PRN Blood Glucose LESS THAN 70 milliGRAM(s)/deciliter  hydrALAZINE Injectable 10 milliGRAM(s) IV Push every 1 hour PRN SBP>150  labetalol Injectable 10 milliGRAM(s) IV Push every 2 hours PRN Diastolic blood pressure > 150  ondansetron Injectable 4 milliGRAM(s) IV Push every 6 hours PRN Nausea and/or Vomiting  oxyCODONE    IR 5 milliGRAM(s) Oral every 4 hours PRN Moderate Pain (4 - 6)    HEPARIN:  [x] YES [] NO  Dose: 5000 UNITS Q12H  SCD's: YES b/l  GI Prophylaxis: Protonix [], Pepcid [x], None [], (Contra-indication:.....)    Post-Op Beta-Blockers: Yes [x], No[], If No, then contraindication:  Post-Op Aspirin: Yes [x],  No [], If No, then contraindication:  Post-Op Statin: Yes [], No[x], If No, then contraindication: elevated lfts       Allergies    No Known Allergies    Intolerances      Ambulation/Activity Status: ambulate    Assessment/Plan:  59y Male POD #7 s/p CABGx4     - Case and plan discussed with CTU Intensivist and CT Surgeon - Dr. Martin  - Continue CTU supportive care and ongoing plan of care as per continuing CTU rounds.   - Continue DVT/GI prophylaxis  - Incentive Spirometry 10 times an hour  - Continue to advance physical activity as tolerated and continue PT/OT as directed  1. CAD s/p CABG: Continue ASA, and BB. Pain control prn; STATIN held again due to inc lft's  2. Anemia, 2/2 acute blood loss: Monitor H/H. Start venofer and folic acid.   3. HTN- cont Norvasc and bb  4. Post-op A. Fib ppx: monitor electrolytes, converted to sr with amio and procan  5. COPD/Hypoxia: Continue high flow O2, duoneb q6hr, advair. Keep O2sat >92%. Encourage incentive spirometry. lasix for noncardiogenic fluid overload       Social Service Disposition:  Anticipate d/c home once off of oxygen

## 2025-02-27 ENCOUNTER — TRANSCRIPTION ENCOUNTER (OUTPATIENT)
Age: 60
End: 2025-02-27

## 2025-02-27 LAB
ALBUMIN SERPL ELPH-MCNC: 3.4 G/DL — LOW (ref 3.5–5.2)
ALP SERPL-CCNC: 54 U/L — SIGNIFICANT CHANGE UP (ref 30–115)
ALT FLD-CCNC: 141 U/L — HIGH (ref 0–41)
ANION GAP SERPL CALC-SCNC: 10 MMOL/L — SIGNIFICANT CHANGE UP (ref 7–14)
AST SERPL-CCNC: 121 U/L — HIGH (ref 0–41)
BILIRUB SERPL-MCNC: 0.7 MG/DL — SIGNIFICANT CHANGE UP (ref 0.2–1.2)
BUN SERPL-MCNC: 43 MG/DL — HIGH (ref 10–20)
CALCIUM SERPL-MCNC: 7.7 MG/DL — LOW (ref 8.4–10.5)
CHLORIDE SERPL-SCNC: 95 MMOL/L — LOW (ref 98–110)
CO2 SERPL-SCNC: 26 MMOL/L — SIGNIFICANT CHANGE UP (ref 17–32)
CREAT SERPL-MCNC: 1.5 MG/DL — SIGNIFICANT CHANGE UP (ref 0.7–1.5)
EGFR: 53 ML/MIN/1.73M2 — LOW
EGFR: 53 ML/MIN/1.73M2 — LOW
GLUCOSE BLDC GLUCOMTR-MCNC: 116 MG/DL — HIGH (ref 70–99)
GLUCOSE BLDC GLUCOMTR-MCNC: 130 MG/DL — HIGH (ref 70–99)
GLUCOSE BLDC GLUCOMTR-MCNC: 133 MG/DL — HIGH (ref 70–99)
GLUCOSE BLDC GLUCOMTR-MCNC: 134 MG/DL — HIGH (ref 70–99)
GLUCOSE SERPL-MCNC: 102 MG/DL — HIGH (ref 70–99)
HCT VFR BLD CALC: 25 % — LOW (ref 42–52)
HGB BLD-MCNC: 8.1 G/DL — LOW (ref 14–18)
MAGNESIUM SERPL-MCNC: 2.6 MG/DL — HIGH (ref 1.8–2.4)
MCHC RBC-ENTMCNC: 30 PG — SIGNIFICANT CHANGE UP (ref 27–31)
MCHC RBC-ENTMCNC: 32.4 G/DL — SIGNIFICANT CHANGE UP (ref 32–37)
MCV RBC AUTO: 92.6 FL — SIGNIFICANT CHANGE UP (ref 80–94)
NRBC BLD AUTO-RTO: 0 /100 WBCS — SIGNIFICANT CHANGE UP (ref 0–0)
PLATELET # BLD AUTO: 227 K/UL — SIGNIFICANT CHANGE UP (ref 130–400)
PMV BLD: 10.3 FL — SIGNIFICANT CHANGE UP (ref 7.4–10.4)
POTASSIUM SERPL-MCNC: 3.8 MMOL/L — SIGNIFICANT CHANGE UP (ref 3.5–5)
POTASSIUM SERPL-SCNC: 3.8 MMOL/L — SIGNIFICANT CHANGE UP (ref 3.5–5)
PROT SERPL-MCNC: 5.6 G/DL — LOW (ref 6–8)
RBC # BLD: 2.7 M/UL — LOW (ref 4.7–6.1)
RBC # FLD: 16.4 % — HIGH (ref 11.5–14.5)
SODIUM SERPL-SCNC: 131 MMOL/L — LOW (ref 135–146)
WBC # BLD: 9.78 K/UL — SIGNIFICANT CHANGE UP (ref 4.8–10.8)
WBC # FLD AUTO: 9.78 K/UL — SIGNIFICANT CHANGE UP (ref 4.8–10.8)

## 2025-02-27 PROCEDURE — 71045 X-RAY EXAM CHEST 1 VIEW: CPT | Mod: 26

## 2025-02-27 PROCEDURE — 93010 ELECTROCARDIOGRAM REPORT: CPT

## 2025-02-27 RX ORDER — DILTIAZEM HYDROCHLORIDE 240 MG/1
10 TABLET, EXTENDED RELEASE ORAL
Refills: 0 | Status: DISCONTINUED | OUTPATIENT
Start: 2025-02-27 | End: 2025-03-01

## 2025-02-27 RX ADMIN — METOPROLOL SUCCINATE 25 MILLIGRAM(S): 50 TABLET, EXTENDED RELEASE ORAL at 05:37

## 2025-02-27 RX ADMIN — METOPROLOL SUCCINATE 25 MILLIGRAM(S): 50 TABLET, EXTENDED RELEASE ORAL at 14:19

## 2025-02-27 RX ADMIN — LIDOCAINE HYDROCHLORIDE 1 PATCH: 20 JELLY TOPICAL at 19:42

## 2025-02-27 RX ADMIN — CLOPIDOGREL BISULFATE 75 MILLIGRAM(S): 75 TABLET, FILM COATED ORAL at 12:46

## 2025-02-27 RX ADMIN — GABAPENTIN 100 MILLIGRAM(S): 400 CAPSULE ORAL at 22:00

## 2025-02-27 RX ADMIN — METOPROLOL SUCCINATE 25 MILLIGRAM(S): 50 TABLET, EXTENDED RELEASE ORAL at 22:00

## 2025-02-27 RX ADMIN — Medication 81 MILLIGRAM(S): at 12:45

## 2025-02-27 RX ADMIN — Medication 20 MILLIGRAM(S): at 17:46

## 2025-02-27 RX ADMIN — Medication 20 MILLIGRAM(S): at 05:37

## 2025-02-27 RX ADMIN — GABAPENTIN 100 MILLIGRAM(S): 400 CAPSULE ORAL at 14:18

## 2025-02-27 RX ADMIN — HEPARIN SODIUM 5000 UNIT(S): 1000 INJECTION INTRAVENOUS; SUBCUTANEOUS at 17:45

## 2025-02-27 RX ADMIN — Medication 1 APPLICATION(S): at 17:45

## 2025-02-27 RX ADMIN — Medication 5 MILLIGRAM(S): at 22:00

## 2025-02-27 RX ADMIN — Medication 1 APPLICATION(S): at 05:36

## 2025-02-27 RX ADMIN — LIDOCAINE HYDROCHLORIDE 1 PATCH: 20 JELLY TOPICAL at 12:50

## 2025-02-27 RX ADMIN — AMIODARONE HYDROCHLORIDE 400 MILLIGRAM(S): 50 INJECTION, SOLUTION INTRAVENOUS at 22:00

## 2025-02-27 RX ADMIN — FOLIC ACID 1 MILLIGRAM(S): 1 TABLET ORAL at 12:48

## 2025-02-27 RX ADMIN — HEPARIN SODIUM 5000 UNIT(S): 1000 INJECTION INTRAVENOUS; SUBCUTANEOUS at 05:37

## 2025-02-27 RX ADMIN — AMIODARONE HYDROCHLORIDE 400 MILLIGRAM(S): 50 INJECTION, SOLUTION INTRAVENOUS at 05:42

## 2025-02-27 RX ADMIN — Medication 5 MILLIGRAM(S): at 17:46

## 2025-02-27 RX ADMIN — Medication 500 MILLIGRAM(S): at 12:45

## 2025-02-27 RX ADMIN — Medication 1 DOSE(S): at 21:59

## 2025-02-27 RX ADMIN — AMIODARONE HYDROCHLORIDE 400 MILLIGRAM(S): 50 INJECTION, SOLUTION INTRAVENOUS at 14:18

## 2025-02-27 RX ADMIN — Medication 5 MILLIGRAM(S): at 05:37

## 2025-02-27 RX ADMIN — GABAPENTIN 100 MILLIGRAM(S): 400 CAPSULE ORAL at 05:38

## 2025-02-27 NOTE — DISCHARGE NOTE NURSING/CASE MANAGEMENT/SOCIAL WORK - FINANCIAL ASSISTANCE
[facility] provides services at a reduced cost to those who are determined to be eligible through [facility]’s financial assistance program. Information regarding [facility]’s financial assistance program can be found by going to [email or web address] or by calling [phone].

## 2025-02-27 NOTE — PROGRESS NOTE ADULT - SUBJECTIVE AND OBJECTIVE BOX
OPERATIVE PROCEDURE(s): CABGx4              POD # 8                SURGEON(s): YUDELKA Martin      SUBJECTIVE ASSESSMENT:59yMale patient seen and examined at bedside. No complaints at this time.     Vital Signs Last 24 Hrs  T(F): 98.3 (27 Feb 2025 07:15), Max: 98.3 (27 Feb 2025 07:15)  HR: 88 (27 Feb 2025 12:05) (65 - 88)  BP: 127/70 (27 Feb 2025 12:05) (82/50 - 127/70)  BP(mean): 90 (27 Feb 2025 12:05) (62 - 90)  RR: 20 (27 Feb 2025 12:05) (17 - 23)  SpO2: 94% (27 Feb 2025 12:05) (90% - 95%)      I&O's Detail    26 Feb 2025 07:01  -  27 Feb 2025 07:00  --------------------------------------------------------  IN:    Albumin 5%  - 500 mL: 250 mL    Oral Fluid: 600 mL  Total IN: 850 mL    OUT:    Voided (mL): 1625 mL  Total OUT: 1625 mL        Net: I&O's Detail    25 Feb 2025 07:01  -  26 Feb 2025 07:00  --------------------------------------------------------  Total NET: 317.3 mL      26 Feb 2025 07:01  -  27 Feb 2025 07:00  --------------------------------------------------------  Total NET: -775 mL        CAPILLARY BLOOD GLUCOSE      POCT Blood Glucose.: 134 mg/dL (27 Feb 2025 11:09)  POCT Blood Glucose.: 133 mg/dL (27 Feb 2025 07:39)  POCT Blood Glucose.: 150 mg/dL (26 Feb 2025 21:43)    A1C with Estimated Average Glucose Result: 6.0 % (02-11-25 @ 13:01)      Physical Exam:  General: NAD; A&Ox3  Neuro: pupils equal and reactive, speech clear, no overt sensory or motor deficts  Cardiac: S1/S2, RRR, no murmur, no rubs  Lungs: unlabored respirations, CTA b/l, no wheeze, no rales, no crackles  Abdomen: Soft/NT/ND; positive bowel sounds x 4  Sternum: Intact, no click, incision healing well with no drainage  Incisions: Incisions clean/dry/intact  Extremities: No edema b/l lower extremities; good capillary refill; no cyanosis; palpable 1+ pedal pulses b/l    Central Venous Catheter: Yes: critical illness, intravenous access  Day #  [place] Catheter: Yes: critical illness; monitor strict i/o's                    Day #  EPICARDIAL WIRES:  YES                                                                         Day #  BOWEL MOVEMENT:  [] YES [] NO, If No, Timing since last BM Day #  CHEST TUBE(MS/Left/Right):  [] YES [] NO, If yes -  AIR LEAKS:  YES/NO        LABS:                        8.1[L]  9.78  )-----------( 227      ( 27 Feb 2025 05:40 )             25.0[L]                        8.3[L]  12.19[H] )-----------( 213      ( 26 Feb 2025 01:40 )             25.1[L]    02-27    131[L]  |  95[L]  |  43[H]  ----------------------------<  102[H]  3.8   |  26  |  1.5  02-26    131[L]  |  94[L]  |  40[H]  ----------------------------<  114[H]  4.3   |  29  |  1.4    Ca    7.7[L]      27 Feb 2025 05:40  Mg     2.6     02-27    TPro  5.6[L] [6.0 - 8.0]  /  Alb  3.4[L] [3.5 - 5.2]  /  TBili  0.7 [0.2 - 1.2]  /  DBili  x   /  AST  121[H] [0 - 41]  /  ALT  141[H] [0 - 41]  /  AlkPhos  54 [30 - 115]  02-27      RADIOLOGY & ADDITIONAL TESTS:  CXR:   < from: Xray Chest 1 View- PORTABLE-Routine (Xray Chest 1 View- PORTABLE-Routine in AM.) (02.27.25 @ 04:48) >  INTERPRETATION:  CLINICAL HISTORY: Postoperative evaluation.    COMPARISON: Chest radiograph dated 2/26/2025.    TECHNIQUE: Portable frontal chest radiograph.    FINDINGS:    Support devices: Interval removal of the previously seen right IJ central   venous catheter.    Cardiac/mediastinum/hilum: Stable cardiomegaly.    Lung parenchyma/Pleura: Stable bilateral opacities/effusions. No   pneumothorax.    Skeleton/soft tissues: Stable.      IMPRESSION:    Stable bilateral opacities/effusions. No pneumothorax.        EKG:  < from: 12 Lead ECG (02.27.25 @ 08:04) >  Ventricular Rate 85 BPM    Atrial Rate 85 BPM    P-R Interval 200 ms    QRS Duration 98 ms    Q-T Interval 390 ms    QTC Calculation(Bazett) 464 ms    P Axis 53 degrees    R Axis 21 degrees    T Axis 2 degrees    Diagnosis Line Normal sinus rhythm  Possible Inferior infarct , age undetermined  Anterior infarct , age undetermined  Abnormal ECG      Allergies    No Known Allergies    Intolerances      MEDICATIONS  (STANDING):  aMIOdarone    Tablet 400 milliGRAM(s) Oral every 8 hours  aMIOdarone    Tablet   Oral   ascorbic acid 500 milliGRAM(s) Oral daily  aspirin enteric coated 81 milliGRAM(s) Oral daily  bacitracin   Ointment 1 Application(s) Topical every 12 hours  bisacodyl 5 milliGRAM(s) Oral every 12 hours  bisacodyl Suppository 10 milliGRAM(s) Rectal once  chlorhexidine 2% Cloths 1 Application(s) Topical daily  clopidogrel Tablet 75 milliGRAM(s) Oral daily  dextrose 5%. 1000 milliLiter(s) (50 mL/Hr) IV Continuous <Continuous>  dextrose 5%. 1000 milliLiter(s) (100 mL/Hr) IV Continuous <Continuous>  dextrose 50% Injectable 50 milliLiter(s) IV Push every 15 minutes  dextrose 50% Injectable 25 milliLiter(s) IV Push every 15 minutes  famotidine    Tablet 20 milliGRAM(s) Oral every 12 hours  fluticasone propionate/ salmeterol 100-50 MICROgram(s) Diskus 1 Dose(s) Inhalation two times a day  folic acid 1 milliGRAM(s) Oral daily  gabapentin 100 milliGRAM(s) Oral every 8 hours  glucagon  Injectable 1 milliGRAM(s) IntraMuscular once  heparin   Injectable 5000 Unit(s) SubCutaneous every 12 hours  insulin lispro (ADMELOG) corrective regimen sliding scale   SubCutaneous three times a day before meals  insulin lispro (ADMELOG) corrective regimen sliding scale   SubCutaneous at bedtime  lidocaine   4% Patch 1 Patch Transdermal daily  melatonin 5 milliGRAM(s) Oral at bedtime  metoprolol tartrate 25 milliGRAM(s) Oral every 8 hours    MEDICATIONS  (PRN):  dextrose Oral Gel 15 Gram(s) Oral once PRN Blood Glucose LESS THAN 70 milliGRAM(s)/deciliter  hydrALAZINE Injectable 10 milliGRAM(s) IV Push every 1 hour PRN SBP>150  ipratropium    for Nebulization 500 MICROGram(s) Nebulizer every 6 hours PRN Shortness of Breath  labetalol Injectable 10 milliGRAM(s) IV Push every 2 hours PRN Diastolic blood pressure > 150  ondansetron Injectable 4 milliGRAM(s) IV Push every 6 hours PRN Nausea and/or Vomiting  oxyCODONE    IR 10 milliGRAM(s) Oral every 6 hours PRN Severe Pain (7 - 10)  oxyCODONE    IR 5 milliGRAM(s) Oral every 4 hours PRN Moderate Pain (4 - 6)    Home Medications:  amLODIPine 5 mg oral tablet: 1 tab(s) orally 2 times a day (19 Feb 2025 06:54)  aspirin 81 mg oral tablet: orally once a day (19 Feb 2025 06:54)  clopidogrel 75 mg oral tablet: 1 tab(s) orally once a day (19 Feb 2025 06:54)  MELOXICAM 15 MG TABLET:  (19 Feb 2025 06:54)  metoprolol succinate 50 mg oral capsule, extended release: 1 cap(s) orally once a day (at bedtime) (19 Feb 2025 06:54)  omeprazole 40 mg oral delayed release capsule: 1 cap(s) orally once a day (19 Feb 2025 06:54)  rosuvastatin 20 mg oral tablet: 1 tab(s) orally once a day (at bedtime) (19 Feb 2025 06:54)  valsartan 160 mg oral tablet: 1 tab(s) orally once a day (19 Feb 2025 06:54)      Pharmacologic DVT Prophylaxis [X] YES, [] NO: Contraindication:  SCD's: YES b/l    GI Prophylaxis: pepcid    Post-Op Beta-Blockers: [X] Yes, [] No: contraindication:  Post-Op Aspirin:  [X] Yes, [] No: contraindication:  Post-Op Statin:  [X] Yes, [] No: contraindication:    Ambulation/Activity Status: ambulate as tolerated    Assessment/Plan:  59y Male s/p CABG x4     POD #8    - Case and plan discussed with CTU Intensivist and CT Surgeon - Dr. Martin  - Continue CTU supportive care and ongoing plan of care as per continuing CTU rounds.   - Continue DVT/GI prophylaxis  - Incentive Spirometry 10 times an hour  - Continue to advance physical activity as tolerated and continue PT/OT as directed  1. CAD s/p CABG: Continue ASA, and BB. Pain control prn; STATIN held again due to inc lft's  2. Anemia, 2/2 acute blood loss: Monitor H/H. Start venofer and folic acid.   3. HTN- cont Norvasc and bb  4. Post-op A. Fib ppx: monitor electrolytes, converted to sr with amio and procan  5. COPD/Hypoxia: Continue high flow O2, duoneb q6hr, advair. Keep O2sat >92%. Encourage incentive spirometry. lasix for noncardiogenic fluid overload       Social Service Disposition:  Anticipate d/c home once off of oxygen

## 2025-02-27 NOTE — DISCHARGE NOTE NURSING/CASE MANAGEMENT/SOCIAL WORK - PATIENT PORTAL LINK FT
You can access the FollowMyHealth Patient Portal offered by [facility] by registering at the following website: [email or web address]. By joining [employer]’s FollowMyHealth portal, you will also be able to view your health information using other applications (apps) compatible with our system.

## 2025-02-27 NOTE — DISCHARGE NOTE NURSING/CASE MANAGEMENT/SOCIAL WORK - NSDCPEFALRISK_GEN_ALL_CORE
For information on Fall & Injury Prevention, visit: [email or web address] OR  [email or web address] OR  https://www.cdc.gov/steadi/patient.html

## 2025-02-27 NOTE — DISCHARGE NOTE NURSING/CASE MANAGEMENT/SOCIAL WORK - NSTRANSFERBELONGINGSRESP_GEN_A_NUR
[FreeTextEntry1] : 43 y/o F pt with:\par \par 1. 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 No polyuria\par Pituitary study: no pituitary hormone secretion abnormality. \par Pt reports no more menstrual periods and denies galactorrhea. \par Pt is referred to be evaluated by a neurologist.\par \par Return in 1 year. yes

## 2025-02-28 LAB
ALBUMIN SERPL ELPH-MCNC: 3.3 G/DL — LOW (ref 3.5–5.2)
ALP SERPL-CCNC: 71 U/L — SIGNIFICANT CHANGE UP (ref 30–115)
ALT FLD-CCNC: 175 U/L — HIGH (ref 0–41)
ANION GAP SERPL CALC-SCNC: 8 MMOL/L — SIGNIFICANT CHANGE UP (ref 7–14)
AST SERPL-CCNC: 124 U/L — HIGH (ref 0–41)
BILIRUB SERPL-MCNC: 0.9 MG/DL — SIGNIFICANT CHANGE UP (ref 0.2–1.2)
BUN SERPL-MCNC: 33 MG/DL — HIGH (ref 10–20)
CALCIUM SERPL-MCNC: 8.2 MG/DL — LOW (ref 8.4–10.5)
CHLORIDE SERPL-SCNC: 91 MMOL/L — LOW (ref 98–110)
CO2 SERPL-SCNC: 28 MMOL/L — SIGNIFICANT CHANGE UP (ref 17–32)
CREAT SERPL-MCNC: 1.3 MG/DL — SIGNIFICANT CHANGE UP (ref 0.7–1.5)
EGFR: 62 ML/MIN/1.73M2 — SIGNIFICANT CHANGE UP
EGFR: 62 ML/MIN/1.73M2 — SIGNIFICANT CHANGE UP
GLUCOSE BLDC GLUCOMTR-MCNC: 113 MG/DL — HIGH (ref 70–99)
GLUCOSE BLDC GLUCOMTR-MCNC: 115 MG/DL — HIGH (ref 70–99)
GLUCOSE BLDC GLUCOMTR-MCNC: 134 MG/DL — HIGH (ref 70–99)
GLUCOSE BLDC GLUCOMTR-MCNC: 166 MG/DL — HIGH (ref 70–99)
GLUCOSE SERPL-MCNC: 111 MG/DL — HIGH (ref 70–99)
HCT VFR BLD CALC: 25.7 % — LOW (ref 42–52)
HGB BLD-MCNC: 8.5 G/DL — LOW (ref 14–18)
MAGNESIUM SERPL-MCNC: 2.2 MG/DL — SIGNIFICANT CHANGE UP (ref 1.8–2.4)
MCHC RBC-ENTMCNC: 30.4 PG — SIGNIFICANT CHANGE UP (ref 27–31)
MCHC RBC-ENTMCNC: 33.1 G/DL — SIGNIFICANT CHANGE UP (ref 32–37)
MCV RBC AUTO: 91.8 FL — SIGNIFICANT CHANGE UP (ref 80–94)
NRBC BLD AUTO-RTO: 0 /100 WBCS — SIGNIFICANT CHANGE UP (ref 0–0)
PLATELET # BLD AUTO: 344 K/UL — SIGNIFICANT CHANGE UP (ref 130–400)
PMV BLD: 10.2 FL — SIGNIFICANT CHANGE UP (ref 7.4–10.4)
POTASSIUM SERPL-MCNC: 3.7 MMOL/L — SIGNIFICANT CHANGE UP (ref 3.5–5)
POTASSIUM SERPL-SCNC: 3.7 MMOL/L — SIGNIFICANT CHANGE UP (ref 3.5–5)
PROT SERPL-MCNC: 6.2 G/DL — SIGNIFICANT CHANGE UP (ref 6–8)
RBC # BLD: 2.8 M/UL — LOW (ref 4.7–6.1)
RBC # FLD: 16 % — HIGH (ref 11.5–14.5)
SODIUM SERPL-SCNC: 127 MMOL/L — LOW (ref 135–146)
WBC # BLD: 11.24 K/UL — HIGH (ref 4.8–10.8)
WBC # FLD AUTO: 11.24 K/UL — HIGH (ref 4.8–10.8)

## 2025-02-28 PROCEDURE — 99291 CRITICAL CARE FIRST HOUR: CPT | Mod: 24

## 2025-02-28 PROCEDURE — 71045 X-RAY EXAM CHEST 1 VIEW: CPT | Mod: 26

## 2025-02-28 PROCEDURE — 93010 ELECTROCARDIOGRAM REPORT: CPT

## 2025-02-28 PROCEDURE — 99232 SBSQ HOSP IP/OBS MODERATE 35: CPT | Mod: 24

## 2025-02-28 RX ORDER — MAGNESIUM, ALUMINUM HYDROXIDE 200-200 MG
30 TABLET,CHEWABLE ORAL EVERY 4 HOURS
Refills: 0 | Status: DISCONTINUED | OUTPATIENT
Start: 2025-02-28 | End: 2025-03-03

## 2025-02-28 RX ORDER — IBUPROFEN 200 MG
400 TABLET ORAL ONCE
Refills: 0 | Status: COMPLETED | OUTPATIENT
Start: 2025-02-28 | End: 2025-02-28

## 2025-02-28 RX ORDER — FUROSEMIDE 10 MG/ML
20 INJECTION INTRAMUSCULAR; INTRAVENOUS ONCE
Refills: 0 | Status: COMPLETED | OUTPATIENT
Start: 2025-02-28 | End: 2025-02-28

## 2025-02-28 RX ORDER — AMIODARONE HYDROCHLORIDE 50 MG/ML
200 INJECTION, SOLUTION INTRAVENOUS EVERY 12 HOURS
Refills: 0 | Status: DISCONTINUED | OUTPATIENT
Start: 2025-02-28 | End: 2025-02-28

## 2025-02-28 RX ORDER — FUROSEMIDE 10 MG/ML
20 INJECTION INTRAMUSCULAR; INTRAVENOUS
Refills: 0 | Status: DISCONTINUED | OUTPATIENT
Start: 2025-02-28 | End: 2025-03-03

## 2025-02-28 RX ADMIN — Medication 30 MILLILITER(S): at 10:11

## 2025-02-28 RX ADMIN — FOLIC ACID 1 MILLIGRAM(S): 1 TABLET ORAL at 11:14

## 2025-02-28 RX ADMIN — Medication 20 MILLIEQUIVALENT(S): at 21:44

## 2025-02-28 RX ADMIN — Medication 81 MILLIGRAM(S): at 11:14

## 2025-02-28 RX ADMIN — Medication 20 MILLIGRAM(S): at 17:37

## 2025-02-28 RX ADMIN — Medication 20 MILLIEQUIVALENT(S): at 06:36

## 2025-02-28 RX ADMIN — HEPARIN SODIUM 5000 UNIT(S): 1000 INJECTION INTRAVENOUS; SUBCUTANEOUS at 05:20

## 2025-02-28 RX ADMIN — LIDOCAINE HYDROCHLORIDE 1 PATCH: 20 JELLY TOPICAL at 11:14

## 2025-02-28 RX ADMIN — GABAPENTIN 100 MILLIGRAM(S): 400 CAPSULE ORAL at 13:27

## 2025-02-28 RX ADMIN — INSULIN LISPRO 1: 100 INJECTION, SOLUTION INTRAVENOUS; SUBCUTANEOUS at 17:37

## 2025-02-28 RX ADMIN — GABAPENTIN 100 MILLIGRAM(S): 400 CAPSULE ORAL at 21:41

## 2025-02-28 RX ADMIN — AMIODARONE HYDROCHLORIDE 400 MILLIGRAM(S): 50 INJECTION, SOLUTION INTRAVENOUS at 05:21

## 2025-02-28 RX ADMIN — FUROSEMIDE 20 MILLIGRAM(S): 10 INJECTION INTRAMUSCULAR; INTRAVENOUS at 13:28

## 2025-02-28 RX ADMIN — Medication 20 MILLIGRAM(S): at 05:21

## 2025-02-28 RX ADMIN — LIDOCAINE HYDROCHLORIDE 1 PATCH: 20 JELLY TOPICAL at 18:17

## 2025-02-28 RX ADMIN — LIDOCAINE HYDROCHLORIDE 1 PATCH: 20 JELLY TOPICAL at 00:00

## 2025-02-28 RX ADMIN — Medication 400 MILLIGRAM(S): at 22:34

## 2025-02-28 RX ADMIN — Medication 500 MILLIGRAM(S): at 11:14

## 2025-02-28 RX ADMIN — Medication 1 APPLICATION(S): at 17:37

## 2025-02-28 RX ADMIN — Medication 1 DOSE(S): at 21:44

## 2025-02-28 RX ADMIN — Medication 400 MILLIGRAM(S): at 23:24

## 2025-02-28 RX ADMIN — GABAPENTIN 100 MILLIGRAM(S): 400 CAPSULE ORAL at 05:23

## 2025-02-28 RX ADMIN — Medication 20 MILLIEQUIVALENT(S): at 11:14

## 2025-02-28 RX ADMIN — Medication 5 MILLIGRAM(S): at 21:41

## 2025-02-28 RX ADMIN — CLOPIDOGREL BISULFATE 75 MILLIGRAM(S): 75 TABLET, FILM COATED ORAL at 17:36

## 2025-02-28 RX ADMIN — LIDOCAINE HYDROCHLORIDE 1 PATCH: 20 JELLY TOPICAL at 23:00

## 2025-02-28 RX ADMIN — Medication 1 DOSE(S): at 07:49

## 2025-02-28 RX ADMIN — METOPROLOL SUCCINATE 25 MILLIGRAM(S): 50 TABLET, EXTENDED RELEASE ORAL at 13:27

## 2025-02-28 RX ADMIN — METOPROLOL SUCCINATE 25 MILLIGRAM(S): 50 TABLET, EXTENDED RELEASE ORAL at 21:42

## 2025-02-28 RX ADMIN — METOPROLOL SUCCINATE 25 MILLIGRAM(S): 50 TABLET, EXTENDED RELEASE ORAL at 06:36

## 2025-02-28 RX ADMIN — Medication 1 APPLICATION(S): at 05:21

## 2025-02-28 RX ADMIN — FUROSEMIDE 20 MILLIGRAM(S): 10 INJECTION INTRAMUSCULAR; INTRAVENOUS at 21:45

## 2025-02-28 RX ADMIN — Medication 5 MILLIGRAM(S): at 17:37

## 2025-02-28 NOTE — PROGRESS NOTE ADULT - SUBJECTIVE AND OBJECTIVE BOX
CTU Attending Progress Daily Note    59M w/ htn, hl, aaa, obesity, nephrolithiasis, gerd, cholecystitis (s/p lap abdulaziz), obesity, former smoker s/p CABG x4 2/19 2/22-25 multiple episodes of afib. given amio, procainamide, metoprolol, digoxin  2/25 furosemide d/c due to cr bump  2/26 gentle diuresis    OBJECTIVE:  OBJECTIVE:  ICU Vital Signs Last 24 Hrs  T(C): 36.6 (28 Feb 2025 08:00), Max: 37.3 (27 Feb 2025 20:00)  T(F): 97.8 (28 Feb 2025 08:00), Max: 99.2 (27 Feb 2025 20:00)  HR: 80 (28 Feb 2025 09:00) (76 - 119)  BP: 110/57 (28 Feb 2025 09:00) (94/55 - 133/72)  BP(mean): 77 (28 Feb 2025 09:00) (68 - 90)  ABP: --  ABP(mean): --  RR: 26 (28 Feb 2025 09:00) (18 - 39)  SpO2: 93% (28 Feb 2025 09:00) (92% - 97%)    O2 Parameters below as of 28 Feb 2025 09:00  Patient On (Oxygen Delivery Method): room air          I&O's Summary    27 Feb 2025 07:01  -  28 Feb 2025 07:00  --------------------------------------------------------  IN: 640 mL / OUT: 1250 mL / NET: -610 mL    28 Feb 2025 07:01  -  28 Feb 2025 10:05  --------------------------------------------------------  IN: 240 mL / OUT: 420 mL / NET: -180 mL      I&O's Detail    27 Feb 2025 07:01  -  28 Feb 2025 07:00  --------------------------------------------------------  IN:    Oral Fluid: 640 mL  Total IN: 640 mL    OUT:    Voided (mL): 1250 mL  Total OUT: 1250 mL    Total NET: -610 mL      28 Feb 2025 07:01  -  28 Feb 2025 10:05  --------------------------------------------------------  IN:    Oral Fluid: 240 mL  Total IN: 240 mL    OUT:    Voided (mL): 420 mL  Total OUT: 420 mL    Total NET: -180 mL        Adult Advanced Hemodynamics Last 24 Hrs  CVP(mm Hg): --  CVP(cm H2O): --  CO: --  CI: --  PA: --  PA(mean): --  PCWP: --  SVR: --  SVRI: --  PVR: --  PVRI: --    CAPILLARY BLOOD GLUCOSE      POCT Blood Glucose.: 113 mg/dL (28 Feb 2025 06:38)  POCT Blood Glucose.: 116 mg/dL (27 Feb 2025 22:01)  POCT Blood Glucose.: 130 mg/dL (27 Feb 2025 16:35)  POCT Blood Glucose.: 134 mg/dL (27 Feb 2025 11:09)    LABS:                          8.5    11.24 )-----------( 344      ( 28 Feb 2025 05:15 )             25.7     02-28    127[L]  |  91[L]  |  33[H]  ----------------------------<  111[H]  3.7   |  28  |  1.3    Ca    8.2[L]      28 Feb 2025 05:15  Mg     2.2     02-28    TPro  6.2  /  Alb  3.3[L]  /  TBili  0.9  /  DBili  x   /  AST  124[H]  /  ALT  175[H]  /  AlkPhos  71  02-28      Urinalysis Basic - ( 28 Feb 2025 05:15 )    Color: x / Appearance: x / SG: x / pH: x  Gluc: 111 mg/dL / Ketone: x  / Bili: x / Urobili: x   Blood: x / Protein: x / Nitrite: x   Leuk Esterase: x / RBC: x / WBC x   Sq Epi: x / Non Sq Epi: x / Bacteria: x        Home Medications:  amLODIPine 5 mg oral tablet: 1 tab(s) orally 2 times a day (19 Feb 2025 06:54)  aspirin 81 mg oral tablet: orally once a day (19 Feb 2025 06:54)  clopidogrel 75 mg oral tablet: 1 tab(s) orally once a day (19 Feb 2025 06:54)  MELOXICAM 15 MG TABLET:  (19 Feb 2025 06:54)  metoprolol succinate 50 mg oral capsule, extended release: 1 cap(s) orally once a day (at bedtime) (19 Feb 2025 06:54)  omeprazole 40 mg oral delayed release capsule: 1 cap(s) orally once a day (19 Feb 2025 06:54)  rosuvastatin 20 mg oral tablet: 1 tab(s) orally once a day (at bedtime) (19 Feb 2025 06:54)  valsartan 160 mg oral tablet: 1 tab(s) orally once a day (19 Feb 2025 06:54)    HOSPITAL MEDICATIONS:  MEDICATIONS  (STANDING):  aMIOdarone    Tablet 200 milliGRAM(s) Oral every 12 hours  ascorbic acid 500 milliGRAM(s) Oral daily  aspirin enteric coated 81 milliGRAM(s) Oral daily  bacitracin   Ointment 1 Application(s) Topical every 12 hours  bisacodyl 5 milliGRAM(s) Oral every 12 hours  bisacodyl Suppository 10 milliGRAM(s) Rectal once  chlorhexidine 2% Cloths 1 Application(s) Topical daily  clopidogrel Tablet 75 milliGRAM(s) Oral daily  dextrose 5%. 1000 milliLiter(s) (50 mL/Hr) IV Continuous <Continuous>  dextrose 5%. 1000 milliLiter(s) (100 mL/Hr) IV Continuous <Continuous>  dextrose 50% Injectable 50 milliLiter(s) IV Push every 15 minutes  dextrose 50% Injectable 25 milliLiter(s) IV Push every 15 minutes  famotidine    Tablet 20 milliGRAM(s) Oral every 12 hours  fluticasone propionate/ salmeterol 100-50 MICROgram(s) Diskus 1 Dose(s) Inhalation two times a day  folic acid 1 milliGRAM(s) Oral daily  furosemide   Injectable 20 milliGRAM(s) IV Push two times a day  gabapentin 100 milliGRAM(s) Oral every 8 hours  glucagon  Injectable 1 milliGRAM(s) IntraMuscular once  heparin   Injectable 5000 Unit(s) SubCutaneous every 12 hours  insulin lispro (ADMELOG) corrective regimen sliding scale   SubCutaneous three times a day before meals  insulin lispro (ADMELOG) corrective regimen sliding scale   SubCutaneous at bedtime  lidocaine   4% Patch 1 Patch Transdermal daily  melatonin 5 milliGRAM(s) Oral at bedtime  metoprolol tartrate 25 milliGRAM(s) Oral every 8 hours  potassium chloride    Tablet ER 20 milliEquivalent(s) Oral daily    MEDICATIONS  (PRN):  aluminum hydroxide/magnesium hydroxide/simethicone Suspension 30 milliLiter(s) Oral every 4 hours PRN Dyspepsia  dextrose Oral Gel 15 Gram(s) Oral once PRN Blood Glucose LESS THAN 70 milliGRAM(s)/deciliter  diltiazem Injectable 10 milliGRAM(s) IV Push every 2 hours PRN HR >110  hydrALAZINE Injectable 10 milliGRAM(s) IV Push every 1 hour PRN SBP>150  ipratropium    for Nebulization 500 MICROGram(s) Nebulizer every 6 hours PRN Shortness of Breath  labetalol Injectable 10 milliGRAM(s) IV Push every 2 hours PRN Diastolic blood pressure > 150  ondansetron Injectable 4 milliGRAM(s) IV Push every 6 hours PRN Nausea and/or Vomiting  oxyCODONE    IR 10 milliGRAM(s) Oral every 6 hours PRN Severe Pain (7 - 10)  oxyCODONE    IR 5 milliGRAM(s) Oral every 4 hours PRN Moderate Pain (4 - 6)    RADIOLOGY:  Chest X-ray Reviewed    General appearance - wake and alert  Heart - regular rate and rhythm, no ectopy  Pulm - bilateral chest rise.  Abdomen - soft, non-distended  EXTREMITIES: warm. good capillary refill. No cyanosis. No erythema  Neurological - awake and alert, moves all extremities

## 2025-02-28 NOTE — PROGRESS NOTE ADULT - SUBJECTIVE AND OBJECTIVE BOX
OPERATIVE PROCEDURE(s):                POD #                       59yMale  SURGEON(s): YUDELKA Martin  SUBJECTIVE ASSESSMENT:     Vital Signs Last 24 Hrs  T(F): 97.6 (28 Feb 2025 04:00), Max: 99.2 (27 Feb 2025 20:00)  HR: 93 (28 Feb 2025 07:00) (79 - 119)  BP: 112/63 (28 Feb 2025 07:00) (95/65 - 133/72)  BP(mean): 79 (28 Feb 2025 07:00) (68 - 90)  ABP: --  ABP(mean): --  RR: 25 (28 Feb 2025 07:00) (18 - 39)  SpO2: 95% (28 Feb 2025 07:00) (92% - 97%)  CVP(mm Hg): --  CVP(cm H2O): --  CO: --  CI: --  PA: --  SVR: --    I&O's Detail    27 Feb 2025 07:01  -  28 Feb 2025 07:00  --------------------------------------------------------  IN:    Oral Fluid: 640 mL  Total IN: 640 mL    OUT:    Voided (mL): 1250 mL  Total OUT: 1250 mL        Net:   I&O's Detail    26 Feb 2025 07:01  -  27 Feb 2025 07:00  --------------------------------------------------------  Total NET: -775 mL      27 Feb 2025 07:01  -  28 Feb 2025 07:00  --------------------------------------------------------  Total NET: -610 mL        CAPILLARY BLOOD GLUCOSE      POCT Blood Glucose.: 113 mg/dL (28 Feb 2025 06:38)  POCT Blood Glucose.: 116 mg/dL (27 Feb 2025 22:01)  POCT Blood Glucose.: 130 mg/dL (27 Feb 2025 16:35)  POCT Blood Glucose.: 134 mg/dL (27 Feb 2025 11:09)    Physical Exam:  General: NAD; A&Ox3  Cardiac: S1/S2, RRR, no murmur, no rubs  Lungs: unlabored shallow respirations, bilateral bs decreased at bases  Abdomen: Soft/NT/protuberant  Sternum: Intact, no click, incision healing well with no drainage  Incisions: Incisions clean/dry/intact  Extremities: No edema b/l lower extremities    Central Venous Catheter: Yes[]  critical patient   Mckeon Catheter: Yes  [] , critical patient strict I&O  NGT: Yes []   EPICARDIAL WIRES:  [] YES  BOWEL MOVEMENT:  [] YES [] NO,   CHEST TUBE(Left/Right):  [] YES [] NO      LABS:                        8.5[L]  11.24[H] )-----------( 344      ( 28 Feb 2025 05:15 )             25.7[L]                        8.1[L]  9.78  )-----------( 227      ( 27 Feb 2025 05:40 )             25.0[L]    02-28    127[L]  |  91[L]  |  33[H]  ----------------------------<  111[H]  3.7   |  28  |  1.3  02-27    131[L]  |  95[L]  |  43[H]  ----------------------------<  102[H]  3.8   |  26  |  1.5    Ca    8.2[L]      28 Feb 2025 05:15  Mg     2.2     02-28    TPro  6.2 [6.0 - 8.0]  /  Alb  3.3[L] [3.5 - 5.2]  /  TBili  0.9 [0.2 - 1.2]  /  DBili  x   /  AST  124[H] [0 - 41]  /  ALT  175[H] [0 - 41]  /  AlkPhos  71 [30 - 115]  02-28          RADIOLOGY & ADDITIONAL TESTS:  CXR:  EKG:  MEDICATIONS  (STANDING):  aMIOdarone    Tablet 200 milliGRAM(s) Oral every 12 hours  ascorbic acid 500 milliGRAM(s) Oral daily  aspirin enteric coated 81 milliGRAM(s) Oral daily  bacitracin   Ointment 1 Application(s) Topical every 12 hours  bisacodyl 5 milliGRAM(s) Oral every 12 hours  bisacodyl Suppository 10 milliGRAM(s) Rectal once  chlorhexidine 2% Cloths 1 Application(s) Topical daily  clopidogrel Tablet 75 milliGRAM(s) Oral daily  dextrose 5%. 1000 milliLiter(s) (50 mL/Hr) IV Continuous <Continuous>  dextrose 5%. 1000 milliLiter(s) (100 mL/Hr) IV Continuous <Continuous>  dextrose 50% Injectable 50 milliLiter(s) IV Push every 15 minutes  dextrose 50% Injectable 25 milliLiter(s) IV Push every 15 minutes  famotidine    Tablet 20 milliGRAM(s) Oral every 12 hours  fluticasone propionate/ salmeterol 100-50 MICROgram(s) Diskus 1 Dose(s) Inhalation two times a day  folic acid 1 milliGRAM(s) Oral daily  furosemide   Injectable 20 milliGRAM(s) IV Push two times a day  gabapentin 100 milliGRAM(s) Oral every 8 hours  glucagon  Injectable 1 milliGRAM(s) IntraMuscular once  heparin   Injectable 5000 Unit(s) SubCutaneous every 12 hours  insulin lispro (ADMELOG) corrective regimen sliding scale   SubCutaneous three times a day before meals  insulin lispro (ADMELOG) corrective regimen sliding scale   SubCutaneous at bedtime  lidocaine   4% Patch 1 Patch Transdermal daily  melatonin 5 milliGRAM(s) Oral at bedtime  metoprolol tartrate 25 milliGRAM(s) Oral every 8 hours    MEDICATIONS  (PRN):  dextrose Oral Gel 15 Gram(s) Oral once PRN Blood Glucose LESS THAN 70 milliGRAM(s)/deciliter  diltiazem Injectable 10 milliGRAM(s) IV Push every 2 hours PRN HR >110  hydrALAZINE Injectable 10 milliGRAM(s) IV Push every 1 hour PRN SBP>150  ipratropium    for Nebulization 500 MICROGram(s) Nebulizer every 6 hours PRN Shortness of Breath  labetalol Injectable 10 milliGRAM(s) IV Push every 2 hours PRN Diastolic blood pressure > 150  ondansetron Injectable 4 milliGRAM(s) IV Push every 6 hours PRN Nausea and/or Vomiting  oxyCODONE    IR 10 milliGRAM(s) Oral every 6 hours PRN Severe Pain (7 - 10)  oxyCODONE    IR 5 milliGRAM(s) Oral every 4 hours PRN Moderate Pain (4 - 6)    Allergies    No Known Allergies    Intolerances      Ambulation/Activity Status: ambulate as tolerated    Assessment/Plan:  59y Male s/p CABG x4     POD #9   - Case and plan discussed with CTU Intensivist and CT Surgeon - Dr. Martin  - Continue CTU supportive care and ongoing plan of care as per continuing CTU rounds.   - Continue DVT/GI prophylaxis  - Incentive Spirometry 10 times an hour  - Continue to advance physical activity as tolerated and continue PT/OT as directed  1. CAD s/p CABG: Continue ASA, and BB. Pain control prn; STATIN held again due to inc lft's  2. Anemia, 2/2 acute blood loss: Monitor H/H. Start venofer and folic acid.   3. HTN- cont Norvasc and bb  4. Post-op A. Fib ppx: monitor electrolytes, converted to sr with amio and procan  5. COPD/Hypoxia: Continue high flow O2, duoneb q6hr, advair. Keep O2sat >92%. Encourage incentive spirometry. lasix for noncardiogenic fluid overload       Social Service Disposition:  Anticipate d/c home once off of oxygen   OPERATIVE PROCEDURE(s):                POD #  9 CABG                     59yMale  SURGEON(s): YUDELKA Martin  SUBJECTIVE ASSESSMENT:     Vital Signs Last 24 Hrs  T(F): 97.6 (28 Feb 2025 04:00), Max: 99.2 (27 Feb 2025 20:00)  HR: 93 (28 Feb 2025 07:00) (79 - 119)  BP: 112/63 (28 Feb 2025 07:00) (95/65 - 133/72)  BP(mean): 79 (28 Feb 2025 07:00) (68 - 90)  RR: 25 (28 Feb 2025 07:00) (18 - 39)  SpO2: 95% (28 Feb 2025 07:00) (92% - 97%)  I&O's Detail    27 Feb 2025 07:01  -  28 Feb 2025 07:00  --------------------------------------------------------  IN:    Oral Fluid: 640 mL  Total IN: 640 mL    OUT:    Voided (mL): 1250 mL  Total OUT: 1250 mL    Net:   I&O's Detail    26 Feb 2025 07:01  -  27 Feb 2025 07:00  --------------------------------------------------------  Total NET: -775 mL    27 Feb 2025 07:01  -  28 Feb 2025 07:00  --------------------------------------------------------  Total NET: -610 mL    CAPILLARY BLOOD GLUCOSE    POCT Blood Glucose.: 113 mg/dL (28 Feb 2025 06:38)  POCT Blood Glucose.: 116 mg/dL (27 Feb 2025 22:01)  POCT Blood Glucose.: 130 mg/dL (27 Feb 2025 16:35)  POCT Blood Glucose.: 134 mg/dL (27 Feb 2025 11:09)    Physical Exam:  General: NAD; A&Ox3  Cardiac: S1/S2, RRR, no murmur, no rubs  Lungs: unlabored shallow respirations, bilateral bs decreased at bases  Abdomen: Soft/NT/protuberant  Sternum: Intact, no click, incision healing well with no drainage  Incisions: Incisions clean/dry/intact, + ecchymosis right thigh  Extremities: mild  edema b/l lower extremities    EPICARDIAL WIRES:  [] YES  BOWEL MOVEMENT:  [] YES         LABS:                        8.5[L]  11.24[H] )-----------( 344      ( 28 Feb 2025 05:15 )             25.7[L]                        8.1[L]  9.78  )-----------( 227      ( 27 Feb 2025 05:40 )             25.0[L]    02-28    127[L]  |  91[L]  |  33[H]  ----------------------------<  111[H]  3.7   |  28  |  1.3  02-27    131[L]  |  95[L]  |  43[H]  ----------------------------<  102[H]  3.8   |  26  |  1.5    Ca    8.2[L]      28 Feb 2025 05:15  Mg     2.2     02-28    TPro  6.2 [6.0 - 8.0]  /  Alb  3.3[L] [3.5 - 5.2]  /  TBili  0.9 [0.2 - 1.2]  /  DBili  x   /  AST  124[H] [0 - 41]  /  ALT  175[H] [0 - 41]  /  AlkPhos  71 [30 - 115]  02-28    RADIOLOGY & ADDITIONAL TESTS:  CXR: < from: Xray Chest 1 View- PORTABLE-Routine (Xray Chest 1 View- PORTABLE-Routine in AM.) (02.28.25 @ 04:23) >  Redemonstration cardiomegaly and bilateral opacities/pleural effusions    < end of copied text >  < from: 12 Lead ECG (02.27.25 @ 08:04) >    Ventricular Rate 85 BPM    Atrial Rate 85 BPM    P-R Interval 200 ms    QRS Duration 98 ms    Q-T Interval 390 ms    QTC Calculation(Bazett) 464 ms    P Axis 53 degrees    R Axis 21 degrees    T Axis 2 degrees    Diagnosis Line Normal sinus rhythm  Possible Inferior infarct , age undetermined  Anterior infarct , age undetermined    EKG:  MEDICATIONS  (STANDING):  aMIOdarone    Tablet 200 milliGRAM(s) Oral every 12 hours  ascorbic acid 500 milliGRAM(s) Oral daily  aspirin enteric coated 81 milliGRAM(s) Oral daily  bacitracin   Ointment 1 Application(s) Topical every 12 hours  bisacodyl 5 milliGRAM(s) Oral every 12 hours  bisacodyl Suppository 10 milliGRAM(s) Rectal once  chlorhexidine 2% Cloths 1 Application(s) Topical daily  clopidogrel Tablet 75 milliGRAM(s) Oral daily  dextrose 5%. 1000 milliLiter(s) (50 mL/Hr) IV Continuous <Continuous>  dextrose 5%. 1000 milliLiter(s) (100 mL/Hr) IV Continuous <Continuous>  dextrose 50% Injectable 50 milliLiter(s) IV Push every 15 minutes  dextrose 50% Injectable 25 milliLiter(s) IV Push every 15 minutes  famotidine    Tablet 20 milliGRAM(s) Oral every 12 hours  fluticasone propionate/ salmeterol 100-50 MICROgram(s) Diskus 1 Dose(s) Inhalation two times a day  folic acid 1 milliGRAM(s) Oral daily  furosemide   Injectable 20 milliGRAM(s) IV Push two times a day  gabapentin 100 milliGRAM(s) Oral every 8 hours  glucagon  Injectable 1 milliGRAM(s) IntraMuscular once  heparin   Injectable 5000 Unit(s) SubCutaneous every 12 hours  insulin lispro (ADMELOG) corrective regimen sliding scale   SubCutaneous three times a day before meals  insulin lispro (ADMELOG) corrective regimen sliding scale   SubCutaneous at bedtime  lidocaine   4% Patch 1 Patch Transdermal daily  melatonin 5 milliGRAM(s) Oral at bedtime  metoprolol tartrate 25 milliGRAM(s) Oral every 8 hours    MEDICATIONS  (PRN):  dextrose Oral Gel 15 Gram(s) Oral once PRN Blood Glucose LESS THAN 70 milliGRAM(s)/deciliter  diltiazem Injectable 10 milliGRAM(s) IV Push every 2 hours PRN HR >110  hydrALAZINE Injectable 10 milliGRAM(s) IV Push every 1 hour PRN SBP>150  ipratropium    for Nebulization 500 MICROGram(s) Nebulizer every 6 hours PRN Shortness of Breath  labetalol Injectable 10 milliGRAM(s) IV Push every 2 hours PRN Diastolic blood pressure > 150  ondansetron Injectable 4 milliGRAM(s) IV Push every 6 hours PRN Nausea and/or Vomiting  oxyCODONE    IR 10 milliGRAM(s) Oral every 6 hours PRN Severe Pain (7 - 10)  oxyCODONE    IR 5 milliGRAM(s) Oral every 4 hours PRN Moderate Pain (4 - 6)    Allergies    No Known Allergies    Intolerances      Ambulation/Activity Status: ambulate as tolerated    Assessment/Plan:  59y Male s/p CABG x4     POD #9   - Case and plan discussed with CTU Intensivist and CT Surgeon - Dr. Martin  - Continue CTU supportive care and ongoing plan of care as per continuing CTU rounds.   - Continue DVT/GI prophylaxis  - Incentive Spirometry 10 times an hour  - Continue to advance physical activity as tolerated and continue PT/OT as directed  1. CAD s/p CABG: Continue ASA, and BB. Pain control prn; STATIN held again due to inc lft's  2. Anemia, 2/2 acute blood loss: Monitor H/H. Start Venofer, ferrous sulfate and folic acid.   3. HTN- cont Norvasc and bb  4. Post-op A. Fib ppx: monitor electrolytes, converted to sr with amio and procan, Amiodarone -  decreased to 200mg daily due to prolonged  - repeat EKG in AM  5. COPD/Hypoxia: expected post op pulmonary insuffiencey Continue high flow O2, duoneb q6hr, advair. Keep O2sat >92%. Encourage incentive spirometry.   6. lasix for noncardiogenic fluid overload  and try to wean to room air, till trend pulse ox, likely will need home O2.      Social Service Disposition:  Anticipate  home once +/- oxygen

## 2025-02-28 NOTE — PROGRESS NOTE ADULT - ASSESSMENT
59M w/ htn, hl, aaa, obesity, nephrolithiasis, gerd, cholecystitis (s/p lap abdulaziz), obesity, former smoker s/p CABG x4 2/19     Assessment/Plan    Neuro:  #acute postoperative pain  - pain controlled  #at risk for postoperative/ICU delirium  - frequent re-orientation, good sleep hygiene, avoid medications which can increase delirium risk  - CAM-ICU qShift    CV: cad s/p cabg  - aspirin and beta blocker  - hold statin for LFT elevation  #afib  - in and out of afib multiple time last few days. currently in sinus  - has been on amio, decrease today per cardiac surgery. QTc reviewed. recommend discontinue amio    Pulm:  #acute postoperative pulmonary insufficiency  #atelectasis  - aggressive incentive spirometry and chest PT to address atelectasis  #acute pulmonary edema  - restart gentle diuresis    Renal/Fluid/Lytes:  #acute fluid overload  - gentle diuresis  - replete/optimize electrolytes  - preload and after optimization    Heme:  #Acute blood loss anemia from surgery  - transfuse for hemoglobin < 7 or hemodynamic instability due to anemia  #afib  - if patient continues to have episodes of afib, will need to consider starting full anticoagulation    ID:  #at risk for surgical infection  - no signs of acute infection    Endocrine:   #acute postoperative stress hyperglycemia  - insulin therapy to achieve tight glucose control    GI:  #at risk for malnutrition  - enteral diet  #at risk for post-operative ileus and opioid-induced constipation  - bowel regimen    Prophylaxis  #at risk of VTE  - SCDs. VTE chemoprophylaxis  - GI prophylaxis per cardiac surgery    PT  - out of bed to chair  - ambulation as much as possible    This patient is critically ill and required my dedicated time to evaluate, manage and maintain or prevent deterioration of the organ systems and problems noted above and provide documentation.    Due to a high probability of clinically significant, life threatening deterioration due to high risk of   cardiac failure, circulatory failure, arrhythmias, acute pulmonary insufficiency, the patient required my highest level of preparedness to intervene emergently and I personally spent this critical care time directly and personally managing the patient. This critical care time included obtaining a history when possible; examining the patient; review pulse oximetry; order / interpreting / review of laboratory and radiology studies with immediate assessment and treatment as needed; directing the titration of pressors, oxygen support devices, fluid resuscitation, interpretation of cardiac output measurements; interpretation of EKG / rhythm strips / telemetry; arranging urgent treatment with development of a management plan; evaluation of patient's response to treatment; frequent reassessment and monitor for potential decompensation; and discussion with other providers/consultants.  This critical care time was performed to assess and manage the high probability of imminent life threatening deterioration that could result in organ(s) failure. It was exclusive of separately billable procedures and treating other patients and teaching time. please see MDM (medical decision making) / Assessment / Plans sections and the rest of the note for further information on patient assessment and treatment.    Time I spent with family or surrogate(s) is included only if the patient was incapable of providing the necessary information or participating in medical decision making. Time devoted to teaching and to any procedures I billed separately is not included.     Management of this patient was discussed with the surgical attending / cardiologist and mid-level providers.    A central line, if present, continues to be necessary for infusion of medications and IV fluids. It is to be removed when other adequate venous access is accomplished.    A restraint, if present, remains because the patient cannot be safely managed without restraints as potential for harm secondary to inadvertent movement and loss of tubes/lines outweighs the burden of restraint.    A silvestre catheter, if present, remains necessary to monitor urine output continuously, and/or divert urine from the skin. It will be removed per policy when it is not needed for these purposes.    I acknowledge the use of copied documentation.  All copy forward documentation is my own and has been reviewed and revised as appropriate.  If no changes were made, it is because that information remains the same.    Trace Caicedo MD  Critical Care Medicine

## 2025-03-01 LAB
ALBUMIN SERPL ELPH-MCNC: 3.1 G/DL — LOW (ref 3.5–5.2)
ALP SERPL-CCNC: 66 U/L — SIGNIFICANT CHANGE UP (ref 30–115)
ALT FLD-CCNC: 158 U/L — HIGH (ref 0–41)
ANION GAP SERPL CALC-SCNC: 9 MMOL/L — SIGNIFICANT CHANGE UP (ref 7–14)
AST SERPL-CCNC: 100 U/L — HIGH (ref 0–41)
BILIRUB SERPL-MCNC: 0.6 MG/DL — SIGNIFICANT CHANGE UP (ref 0.2–1.2)
BUN SERPL-MCNC: 28 MG/DL — HIGH (ref 10–20)
CALCIUM SERPL-MCNC: 8.2 MG/DL — LOW (ref 8.4–10.5)
CHLORIDE SERPL-SCNC: 93 MMOL/L — LOW (ref 98–110)
CO2 SERPL-SCNC: 30 MMOL/L — SIGNIFICANT CHANGE UP (ref 17–32)
CREAT SERPL-MCNC: 1.3 MG/DL — SIGNIFICANT CHANGE UP (ref 0.7–1.5)
EGFR: 62 ML/MIN/1.73M2 — SIGNIFICANT CHANGE UP
EGFR: 62 ML/MIN/1.73M2 — SIGNIFICANT CHANGE UP
GLUCOSE BLDC GLUCOMTR-MCNC: 127 MG/DL — HIGH (ref 70–99)
GLUCOSE BLDC GLUCOMTR-MCNC: 136 MG/DL — HIGH (ref 70–99)
GLUCOSE SERPL-MCNC: 109 MG/DL — HIGH (ref 70–99)
HCT VFR BLD CALC: 25.7 % — LOW (ref 42–52)
HGB BLD-MCNC: 8.3 G/DL — LOW (ref 14–18)
MAGNESIUM SERPL-MCNC: 2.1 MG/DL — SIGNIFICANT CHANGE UP (ref 1.8–2.4)
MCHC RBC-ENTMCNC: 30.2 PG — SIGNIFICANT CHANGE UP (ref 27–31)
MCHC RBC-ENTMCNC: 32.3 G/DL — SIGNIFICANT CHANGE UP (ref 32–37)
MCV RBC AUTO: 93.5 FL — SIGNIFICANT CHANGE UP (ref 80–94)
NRBC BLD AUTO-RTO: 0 /100 WBCS — SIGNIFICANT CHANGE UP (ref 0–0)
PLATELET # BLD AUTO: 347 K/UL — SIGNIFICANT CHANGE UP (ref 130–400)
PMV BLD: 9.9 FL — SIGNIFICANT CHANGE UP (ref 7.4–10.4)
POTASSIUM SERPL-MCNC: 3.8 MMOL/L — SIGNIFICANT CHANGE UP (ref 3.5–5)
POTASSIUM SERPL-SCNC: 3.8 MMOL/L — SIGNIFICANT CHANGE UP (ref 3.5–5)
PROT SERPL-MCNC: 6 G/DL — SIGNIFICANT CHANGE UP (ref 6–8)
RBC # BLD: 2.75 M/UL — LOW (ref 4.7–6.1)
RBC # FLD: 16.3 % — HIGH (ref 11.5–14.5)
SODIUM SERPL-SCNC: 132 MMOL/L — LOW (ref 135–146)
WBC # BLD: 9.09 K/UL — SIGNIFICANT CHANGE UP (ref 4.8–10.8)
WBC # FLD AUTO: 9.09 K/UL — SIGNIFICANT CHANGE UP (ref 4.8–10.8)

## 2025-03-01 PROCEDURE — 71045 X-RAY EXAM CHEST 1 VIEW: CPT | Mod: 26

## 2025-03-01 PROCEDURE — 93010 ELECTROCARDIOGRAM REPORT: CPT

## 2025-03-01 PROCEDURE — 99291 CRITICAL CARE FIRST HOUR: CPT

## 2025-03-01 RX ORDER — AMIODARONE HYDROCHLORIDE 50 MG/ML
400 INJECTION, SOLUTION INTRAVENOUS
Refills: 0 | Status: DISCONTINUED | OUTPATIENT
Start: 2025-03-01 | End: 2025-03-03

## 2025-03-01 RX ADMIN — Medication 40 MILLIEQUIVALENT(S): at 06:48

## 2025-03-01 RX ADMIN — AMIODARONE HYDROCHLORIDE 400 MILLIGRAM(S): 50 INJECTION, SOLUTION INTRAVENOUS at 17:32

## 2025-03-01 RX ADMIN — GABAPENTIN 100 MILLIGRAM(S): 400 CAPSULE ORAL at 14:09

## 2025-03-01 RX ADMIN — FUROSEMIDE 20 MILLIGRAM(S): 10 INJECTION INTRAMUSCULAR; INTRAVENOUS at 14:10

## 2025-03-01 RX ADMIN — Medication 1 APPLICATION(S): at 05:14

## 2025-03-01 RX ADMIN — LIDOCAINE HYDROCHLORIDE 1 PATCH: 20 JELLY TOPICAL at 22:47

## 2025-03-01 RX ADMIN — Medication 81 MILLIGRAM(S): at 11:16

## 2025-03-01 RX ADMIN — Medication 20 MILLIGRAM(S): at 05:17

## 2025-03-01 RX ADMIN — Medication 20 MILLIGRAM(S): at 17:32

## 2025-03-01 RX ADMIN — HEPARIN SODIUM 5000 UNIT(S): 1000 INJECTION INTRAVENOUS; SUBCUTANEOUS at 05:14

## 2025-03-01 RX ADMIN — FOLIC ACID 1 MILLIGRAM(S): 1 TABLET ORAL at 11:17

## 2025-03-01 RX ADMIN — FUROSEMIDE 20 MILLIGRAM(S): 10 INJECTION INTRAMUSCULAR; INTRAVENOUS at 05:14

## 2025-03-01 RX ADMIN — Medication 500 MILLIGRAM(S): at 11:17

## 2025-03-01 RX ADMIN — METOPROLOL SUCCINATE 25 MILLIGRAM(S): 50 TABLET, EXTENDED RELEASE ORAL at 14:09

## 2025-03-01 RX ADMIN — LIDOCAINE HYDROCHLORIDE 1 PATCH: 20 JELLY TOPICAL at 19:45

## 2025-03-01 RX ADMIN — Medication 5 MILLIGRAM(S): at 05:17

## 2025-03-01 RX ADMIN — Medication 5 MILLIGRAM(S): at 22:55

## 2025-03-01 RX ADMIN — METOPROLOL SUCCINATE 25 MILLIGRAM(S): 50 TABLET, EXTENDED RELEASE ORAL at 22:55

## 2025-03-01 RX ADMIN — LIDOCAINE HYDROCHLORIDE 1 PATCH: 20 JELLY TOPICAL at 11:16

## 2025-03-01 RX ADMIN — HEPARIN SODIUM 5000 UNIT(S): 1000 INJECTION INTRAVENOUS; SUBCUTANEOUS at 17:31

## 2025-03-01 RX ADMIN — Medication 1 APPLICATION(S): at 17:32

## 2025-03-01 RX ADMIN — GABAPENTIN 100 MILLIGRAM(S): 400 CAPSULE ORAL at 05:17

## 2025-03-01 RX ADMIN — AMIODARONE HYDROCHLORIDE 400 MILLIGRAM(S): 50 INJECTION, SOLUTION INTRAVENOUS at 13:07

## 2025-03-01 RX ADMIN — GABAPENTIN 100 MILLIGRAM(S): 400 CAPSULE ORAL at 22:55

## 2025-03-01 RX ADMIN — Medication 20 MILLIEQUIVALENT(S): at 11:18

## 2025-03-01 RX ADMIN — CLOPIDOGREL BISULFATE 75 MILLIGRAM(S): 75 TABLET, FILM COATED ORAL at 11:17

## 2025-03-01 RX ADMIN — METOPROLOL SUCCINATE 25 MILLIGRAM(S): 50 TABLET, EXTENDED RELEASE ORAL at 06:47

## 2025-03-01 NOTE — PROGRESS NOTE ADULT - SUBJECTIVE AND OBJECTIVE BOX
CTU Attending Progress Daily Note     01 Mar 2025 16:22  Procedure: CABG (C4L)  POD#: 10    He has history of Hypertension    Gallstones    History of abdominal aortic aneurysm (AAA)    Obesity    CAD (coronary artery disease)    History of hepatitis C    HPI:   59y Male PMH of HTN, HLD, nephrolithiasis, GERD, Cholecystitis (s/p lap abdulaziz), AAA without prior surgical intervention, obesity, former smoker (smoked for 40 years, quit 3 years ago), chronic back pain. Patient reported SOB when going up 2 flights of stairs or when walking at a fast pace. CCTA revealed CAC of 847 with multivessel disease, followed by CT Fractional flow Reserve (FFR-CT) confirming findings. Presented today for elective LHC which demonstrated Left main with multi-vessel involvement. On 02/19/25, he presented for elective CABG.     OR Data  Procedure: C4L  Bypass: 160  Cross Clamp: None   Pre LV Fxn: 50-55%      RV Fxn: nml  Post LV Fxn: 50-55%     RV Fxn: nml  Blood Products: pltsx2, cryox2  Cell Saver: 942 cc  Fluids: 2.5L  Pacing Wires: v wires    Hospital Course:  2/22-25 multiple episodes of afib. given amio, procainamide, metoprolol, digoxin  2/25 furosemide d/c due to cr bump  2/26 gentle diuresis  3/1: In and out of afib; restarted Amio (QTC 400s)    OBJECTIVE:  ICU Vital Signs Last 24 Hrs  T(C): 37.3 (01 Mar 2025 16:00), Max: 38 (28 Feb 2025 22:00)  T(F): 99.1 (01 Mar 2025 16:00), Max: 100.4 (28 Feb 2025 22:00)  HR: 85 (01 Mar 2025 16:00) (74 - 96)  BP: 122/64 (01 Mar 2025 16:00) (87/52 - 148/76)  BP(mean): 87 (01 Mar 2025 16:00) (64 - 112)  ABP: --  ABP(mean): --  RR: 21 (01 Mar 2025 16:00) (18 - 32)  SpO2: 96% (01 Mar 2025 16:00) (92% - 98%)    O2 Parameters below as of 01 Mar 2025 16:00  Patient On (Oxygen Delivery Method): room air    I&O's Summary    28 Feb 2025 07:01  -  01 Mar 2025 07:00  --------------------------------------------------------  IN: 1690 mL / OUT: 3460 mL / NET: -1770 mL    01 Mar 2025 07:01  -  01 Mar 2025 16:22  --------------------------------------------------------  IN: 600 mL / OUT: 220 mL / NET: 380 mL      I&O's Detail    28 Feb 2025 07:01  -  01 Mar 2025 07:00  --------------------------------------------------------  IN:    Oral Fluid: 1690 mL  Total IN: 1690 mL    OUT:    Voided (mL): 3460 mL  Total OUT: 3460 mL    Total NET: -1770 mL      01 Mar 2025 07:01  -  01 Mar 2025 16:22  --------------------------------------------------------  IN:    Oral Fluid: 600 mL  Total IN: 600 mL    OUT:    Voided (mL): 220 mL  Total OUT: 220 mL    Total NET: 380 mL    CAPILLARY BLOOD GLUCOSE    POCT Blood Glucose.: 127 mg/dL (01 Mar 2025 11:35)  POCT Blood Glucose.: 136 mg/dL (01 Mar 2025 06:51)  POCT Blood Glucose.: 134 mg/dL (28 Feb 2025 21:47)  POCT Blood Glucose.: 166 mg/dL (28 Feb 2025 16:47)    LABS:                          8.3    9.09  )-----------( 347      ( 01 Mar 2025 04:00 )             25.7     03-01    132[L]  |  93[L]  |  28[H]  ----------------------------<  109[H]  3.8   |  30  |  1.3    Ca    8.2[L]      01 Mar 2025 04:00  Mg     2.1     03-01    TPro  6.0  /  Alb  3.1[L]  /  TBili  0.6  /  DBili  x   /  AST  100[H]  /  ALT  158[H]  /  AlkPhos  66  03-01    Urinalysis Basic - ( 01 Mar 2025 04:00 )    Color: x / Appearance: x / SG: x / pH: x  Gluc: 109 mg/dL / Ketone: x  / Bili: x / Urobili: x   Blood: x / Protein: x / Nitrite: x   Leuk Esterase: x / RBC: x / WBC x   Sq Epi: x / Non Sq Epi: x / Bacteria: x    Home Medications:  amLODIPine 5 mg oral tablet: 1 tab(s) orally 2 times a day (19 Feb 2025 06:54)  aspirin 81 mg oral tablet: orally once a day (19 Feb 2025 06:54)  clopidogrel 75 mg oral tablet: 1 tab(s) orally once a day (19 Feb 2025 06:54)  MELOXICAM 15 MG TABLET:  (19 Feb 2025 06:54)  metoprolol succinate 50 mg oral capsule, extended release: 1 cap(s) orally once a day (at bedtime) (19 Feb 2025 06:54)  omeprazole 40 mg oral delayed release capsule: 1 cap(s) orally once a day (19 Feb 2025 06:54)  rosuvastatin 20 mg oral tablet: 1 tab(s) orally once a day (at bedtime) (19 Feb 2025 06:54)  valsartan 160 mg oral tablet: 1 tab(s) orally once a day (19 Feb 2025 06:54)    HOSPITAL MEDICATIONS:  MEDICATIONS  (STANDING):  aMIOdarone    Tablet 400 milliGRAM(s) Oral two times a day  ascorbic acid 500 milliGRAM(s) Oral daily  aspirin enteric coated 81 milliGRAM(s) Oral daily  bacitracin   Ointment 1 Application(s) Topical every 12 hours  bisacodyl 5 milliGRAM(s) Oral every 12 hours  chlorhexidine 2% Cloths 1 Application(s) Topical daily  clopidogrel Tablet 75 milliGRAM(s) Oral daily  famotidine    Tablet 20 milliGRAM(s) Oral every 12 hours  fluticasone propionate/ salmeterol 100-50 MICROgram(s) Diskus 1 Dose(s) Inhalation two times a day  folic acid 1 milliGRAM(s) Oral daily  furosemide   Injectable 20 milliGRAM(s) IV Push two times a day  gabapentin 100 milliGRAM(s) Oral every 8 hours  heparin   Injectable 5000 Unit(s) SubCutaneous every 12 hours  lidocaine   4% Patch 1 Patch Transdermal daily  melatonin 5 milliGRAM(s) Oral at bedtime  metoprolol tartrate 25 milliGRAM(s) Oral every 8 hours  potassium chloride    Tablet ER 20 milliEquivalent(s) Oral daily    MEDICATIONS  (PRN):  aluminum hydroxide/magnesium hydroxide/simethicone Suspension 30 milliLiter(s) Oral every 4 hours PRN Dyspepsia  hydrALAZINE Injectable 10 milliGRAM(s) IV Push every 1 hour PRN SBP>150  ipratropium    for Nebulization 500 MICROGram(s) Nebulizer every 6 hours PRN Shortness of Breath  oxyCODONE    IR 10 milliGRAM(s) Oral every 6 hours PRN Severe Pain (7 - 10)  oxyCODONE    IR 5 milliGRAM(s) Oral every 4 hours PRN Moderate Pain (4 - 6)    RADIOLOGY:  Chest X-ray Reviewed    REVIEW OF SYSTEMS:  CONSTITUTIONAL: [X] all negative; [ ] weakness, [ ] fevers, [ ] chills  EYES/ENT: [X] all negative; [ ] visual changes, [ ] vertigo, [ ] throat pain   NECK: [X] all negative; [ ] pain, [ ] stiffness  RESPIRATORY: [] all negative, [ ] cough, [ ] wheezing, [ ] hemoptysis, [ ] shortness of breath  CARDIOVASCULAR: [] all negative; [ ] chest pain, [ ] palpitations, [ ] orthopnea  GASTROINTESTINAL: [X] all negative; [ ]abdominal pain, [ ] nausea, [ ] vomiting, [ ] hematemesis, [ ] diarrhea, [ ] constipation, [ ] melena, [ ] hematochezia.  GENITOURINARY: [X] all negative; [ ] dysuria, [ ] frequency, [ ] hematuria  NEUROLOGICAL: [X] all negative; [ ] numbness, [ ] weakness  SKIN: [X] all negative; [ ] itching, [ ] burning, [ ] rashes, [ ] lesions   All other review of systems is negative unless indicated above.  [  ] Unable to assess ROS because     PHYSICAL EXAM:              CONSTITUTIONAL: No acute distress  	SKIN: warm, dry  	EYES: PERRL, EOM, no conj injection, sclera clear  	NECK: JVP mid neck   	CARD: Regular rate and rhythm, no murmurs, rubs or gallops  	RESP: CTA B/L; no wheezes, rales or rhonchi.  	ABD: Soft, not tender, not distended, bowel sounds present  	EXT: 2+ pulses on all extremities, no clubbing, cyanosis or edema  	NEURO: A+Ox3, strength intact throughout    Assessment   s/p CABG POD 10 currently progressing well with recovery and optimization for discharge.     Plan:    Neuro:  Multimodal pain management  Tylenol, Lidoderm patch, gabapentin, opiates as needed  Monitor neuro status in the post op period    CV:  S/P CABG  Monitor perfusion, lactate, UOP,   Maintain MAP > 65  ASA, Plavix statin  Continue Lopressor   Restart Amio PO (QTC has normalized)     Resp:  Room air, sats > 92%  Continuous pulse oximetry monitoring  IS / Chest PT  OOBTC and Ambulate  Nebulizers as needed    GI:  Heart healthy diet  Bowel Regimen     Renal  Creatinine acceptable  Monitor UOP, lytes, creatinine  Continue diuresis with IV Lasix  Keep K > 4, Mg > 2    Endo:  Tight glucose control per CTICU protocl  Not requiring Insulin    Heme:  Acute blood loss anemia post surgery  DVT prophylaxis with Heparin subQ    ID:  Monitor fever curve and WBC count        Upon my evaluation, the above patient has a high probabillity of imminent or life threatening deterioration due to a high risk for Shock, Cardiogenic shock, arrythmias, acute blood loss, acute kidney injury and acute pulmonary insufficiency which required my direct attention, intervention, and personal management.  Total time was 55 minutes which includes review of radiology results, ordering, interpreting and reviewing diagnostic studies / lab tests with immediate assessment and treatment, consultant collaboration on findings and treatment options, monitoring for potential decompensation, obtaining history from family, EMT, nursing home staff and/or treating physicians; directing the titration of pressors, oxygen support devices, fluid resucitation, interpretation of cardiac output measurements, interpretation of radiological assessments, interpretation of EKG / rhythm strips, telemetry.  This time did not overlap with the management of other patients or performing separately reportable procedures.      Management of this patient was discussed with the CT surgery attending and mid-level providers.    I ackknowledge the use of copied documentation.  All copy forward documentation is my own and has been reviewed and revised as appropriate.  If no changes were made, it is because that information remains the same.

## 2025-03-01 NOTE — PROGRESS NOTE ADULT - SUBJECTIVE AND OBJECTIVE BOX
OPERATIVE PROCEDURE(s):                POD # 10 CABG                      59yMale  SURGEON(s): YUDELKA Martin  SUBJECTIVE ASSESSMENT: feeling better    Vital Signs Last 24 Hrs  T(F): 96.8 (01 Mar 2025 04:00), Max: 100.4 (28 Feb 2025 22:00)  HR: 95 (01 Mar 2025 07:00) (74 - 96)  BP: 105/65 (01 Mar 2025 07:00) (87/52 - 148/76)  BP(mean): 80 (01 Mar 2025 07:00) (64 - 101)  RR: 31 (01 Mar 2025 07:00) (18 - 32)  SpO2: 96% (01 Mar 2025 07:00) (92% - 98%)      I&O's Detail    28 Feb 2025 07:01  -  01 Mar 2025 07:00  --------------------------------------------------------  IN:    Oral Fluid: 1690 mL  Total IN: 1690 mL    OUT:    Voided (mL): 3460 mL  Total OUT: 3460 mL        Net:   I&O's Detail    27 Feb 2025 07:01  -  28 Feb 2025 07:00  --------------------------------------------------------  Total NET: -610 mL      28 Feb 2025 07:01  -  01 Mar 2025 07:00  --------------------------------------------------------  Total NET: -1770 mL        CAPILLARY BLOOD GLUCOSE      POCT Blood Glucose.: 136 mg/dL (01 Mar 2025 06:51)  POCT Blood Glucose.: 134 mg/dL (28 Feb 2025 21:47)  POCT Blood Glucose.: 166 mg/dL (28 Feb 2025 16:47)  POCT Blood Glucose.: 115 mg/dL (28 Feb 2025 11:02)    Physical Exam:  General: NAD; A&Ox3  Cardiac: S1/S2, RRR, no murmur, no rubs  Lungs: unlabored shallow respirations, bilateral bs decreased at bases  Abdomen: Soft/NT/protuberant  Sternum: Intact, no click, incision healing well with no drainage  Incisions: Incisions clean/dry/intact, + ecchymosis right thigh  Extremities: mild  edema b/l lower extremities    BOWEL MOVEMENT:  [] YES     LABS:                        8.3[L]  9.09  )-----------( 347      ( 01 Mar 2025 04:00 )             25.7[L]                        8.5[L]  11.24[H] )-----------( 344      ( 28 Feb 2025 05:15 )             25.7[L]    03-01    132[L]  |  93[L]  |  28[H]  ----------------------------<  109[H]  3.8   |  30  |  1.3  02-28    127[L]  |  91[L]  |  33[H]  ----------------------------<  111[H]  3.7   |  28  |  1.3    Ca    8.2[L]      01 Mar 2025 04:00  Mg     2.1     03-01    TPro  6.0 [6.0 - 8.0]  /  Alb  3.1[L] [3.5 - 5.2]  /  TBili  0.6 [0.2 - 1.2]  /  DBili  x   /  AST  100[H] [0 - 41]  /  ALT  158[H] [0 - 41]  /  AlkPhos  66 [30 - 115]  03-01          RADIOLOGY & ADDITIONAL TESTS:  CXR:  EKG:  MEDICATIONS  (STANDING):  ascorbic acid 500 milliGRAM(s) Oral daily  aspirin enteric coated 81 milliGRAM(s) Oral daily  bacitracin   Ointment 1 Application(s) Topical every 12 hours  bisacodyl 5 milliGRAM(s) Oral every 12 hours  bisacodyl Suppository 10 milliGRAM(s) Rectal once  chlorhexidine 2% Cloths 1 Application(s) Topical daily  clopidogrel Tablet 75 milliGRAM(s) Oral daily  dextrose 5%. 1000 milliLiter(s) (50 mL/Hr) IV Continuous <Continuous>  dextrose 5%. 1000 milliLiter(s) (100 mL/Hr) IV Continuous <Continuous>  dextrose 50% Injectable 50 milliLiter(s) IV Push every 15 minutes  dextrose 50% Injectable 25 milliLiter(s) IV Push every 15 minutes  famotidine    Tablet 20 milliGRAM(s) Oral every 12 hours  fluticasone propionate/ salmeterol 100-50 MICROgram(s) Diskus 1 Dose(s) Inhalation two times a day  folic acid 1 milliGRAM(s) Oral daily  furosemide   Injectable 20 milliGRAM(s) IV Push two times a day  gabapentin 100 milliGRAM(s) Oral every 8 hours  glucagon  Injectable 1 milliGRAM(s) IntraMuscular once  heparin   Injectable 5000 Unit(s) SubCutaneous every 12 hours  insulin lispro (ADMELOG) corrective regimen sliding scale   SubCutaneous three times a day before meals  insulin lispro (ADMELOG) corrective regimen sliding scale   SubCutaneous at bedtime  lidocaine   4% Patch 1 Patch Transdermal daily  melatonin 5 milliGRAM(s) Oral at bedtime  metoprolol tartrate 25 milliGRAM(s) Oral every 8 hours  potassium chloride    Tablet ER 20 milliEquivalent(s) Oral daily    MEDICATIONS  (PRN):  aluminum hydroxide/magnesium hydroxide/simethicone Suspension 30 milliLiter(s) Oral every 4 hours PRN Dyspepsia  dextrose Oral Gel 15 Gram(s) Oral once PRN Blood Glucose LESS THAN 70 milliGRAM(s)/deciliter  diltiazem Injectable 10 milliGRAM(s) IV Push every 2 hours PRN HR >110  hydrALAZINE Injectable 10 milliGRAM(s) IV Push every 1 hour PRN SBP>150  ipratropium    for Nebulization 500 MICROGram(s) Nebulizer every 6 hours PRN Shortness of Breath  labetalol Injectable 10 milliGRAM(s) IV Push every 2 hours PRN Diastolic blood pressure > 150  ondansetron Injectable 4 milliGRAM(s) IV Push every 6 hours PRN Nausea and/or Vomiting  oxyCODONE    IR 10 milliGRAM(s) Oral every 6 hours PRN Severe Pain (7 - 10)  oxyCODONE    IR 5 milliGRAM(s) Oral every 4 hours PRN Moderate Pain (4 - 6)    Allergies    No Known Allergies    Intolerances      Ambulation/Activity Status: ambulate as tolerated    Assessment/Plan:  59y Male s/p CABG x4     POD #10   - Case and plan discussed with CTU Intensivist and CT Surgeon - Dr. Martin  - Continue CTU supportive care and ongoing plan of care as per continuing CTU rounds.   - Continue DVT/GI prophylaxis  - Incentive Spirometry 10 times an hour  - Continue to advance physical activity as tolerated and continue PT/OT as directed  1. CAD s/p CABG: Continue ASA, and BB. Pain control prn; STATIN held again due to elevated lft's  2. Anemia, 2/2 acute po blood loss: Monitor H/H. continue Venofer, ferrous sulfate and folic acid.   3. HTN- cont Norvasc and bb  4. Post-op A. Fib ppx: monitor electrolytes, converted to sr with amio and procan, Amiodarone discontinued yesterday due to prolonged QTC and elevated LFT's- repeat EKG in AM  5. COPD/Hypoxia: expected post op pulmonary insuffiencey Continue high flow O2, duoneb q6hr, advair. Keep O2sat >92%. Encourage incentive spirometry.   6. lasix for noncardiogenic fluid overload  and try to wean to room air, till trend pulse ox, likely will need home O2.      Social Service Disposition:  Anticipate  home once +/- oxygen   OPERATIVE PROCEDURE(s):                POD # 10 CABG                      59yMale  SURGEON(s): YUDELKA Martin  SUBJECTIVE ASSESSMENT: feeling better    Vital Signs Last 24 Hrs  T(F): 96.8 (01 Mar 2025 04:00), Max: 100.4 (28 Feb 2025 22:00)  HR: 95 (01 Mar 2025 07:00) (74 - 96)  BP: 105/65 (01 Mar 2025 07:00) (87/52 - 148/76)  BP(mean): 80 (01 Mar 2025 07:00) (64 - 101)  RR: 31 (01 Mar 2025 07:00) (18 - 32)  SpO2: 96% (01 Mar 2025 07:00) (92% - 98%)      I&O's Detail    28 Feb 2025 07:01  -  01 Mar 2025 07:00  --------------------------------------------------------  IN:    Oral Fluid: 1690 mL  Total IN: 1690 mL    OUT:    Voided (mL): 3460 mL  Total OUT: 3460 mL        Net:   I&O's Detail    27 Feb 2025 07:01  -  28 Feb 2025 07:00  --------------------------------------------------------  Total NET: -610 mL      28 Feb 2025 07:01  -  01 Mar 2025 07:00  --------------------------------------------------------  Total NET: -1770 mL        CAPILLARY BLOOD GLUCOSE      POCT Blood Glucose.: 136 mg/dL (01 Mar 2025 06:51)  POCT Blood Glucose.: 134 mg/dL (28 Feb 2025 21:47)  POCT Blood Glucose.: 166 mg/dL (28 Feb 2025 16:47)  POCT Blood Glucose.: 115 mg/dL (28 Feb 2025 11:02)    Physical Exam:  General: NAD; A&Ox3  Cardiac: S1/S2, RRR, no murmur, no rubs  Lungs: unlabored shallow respirations, bilateral bs decreased at bases  Abdomen: Soft/NT/protuberant  Sternum: Intact, no click, incision healing well with no drainage  Incisions: Incisions clean/dry/intact, + ecchymosis right thigh  Extremities: mild  edema b/l lower extremities    BOWEL MOVEMENT:  [] YES     LABS:                        8.3[L]  9.09  )-----------( 347      ( 01 Mar 2025 04:00 )             25.7[L]                        8.5[L]  11.24[H] )-----------( 344      ( 28 Feb 2025 05:15 )             25.7[L]    03-01    132[L]  |  93[L]  |  28[H]  ----------------------------<  109[H]  3.8   |  30  |  1.3  02-28    127[L]  |  91[L]  |  33[H]  ----------------------------<  111[H]  3.7   |  28  |  1.3    Ca    8.2[L]      01 Mar 2025 04:00  Mg     2.1     03-01    TPro  6.0 [6.0 - 8.0]  /  Alb  3.1[L] [3.5 - 5.2]  /  TBili  0.6 [0.2 - 1.2]  /  DBili  x   /  AST  100[H] [0 - 41]  /  ALT  158[H] [0 - 41]  /  AlkPhos  66 [30 - 115]  03-01          RADIOLOGY & ADDITIONAL TESTS:  CXR:  < from: Xray Chest 1 View- PORTABLE-Routine (Xray Chest 1 View- PORTABLE-Routine in AM.) (03.01.25 @ 05:25) >  IMPRESSION:    Unchanged bibasilar opacities and effusions.    --- End of Report ---      < end of copied text >    EKG:  < from: 12 Lead ECG (02.28.25 @ 07:37) >  Ventricular Rate 77 BPM    Atrial Rate 77 BPM    P-R Interval 208 ms    QRS Duration 98 ms    Q-T Interval 450 ms    QTC Calculation(Bazett) 509 ms    P Axis 66 degrees    R Axis 36 degrees    T Axis 44 degrees    Diagnosis Line Normal sinus rhythm  Low voltage QRS  Cannot rule out Anterior infarct , age undetermined  Prolonged QT  Abnormal ECG    Confirmed by Anthony Abbasi ([id number]) on 2/28/2025 3:58:30 PM    < end of copied text >    MEDICATIONS  (STANDING):  ascorbic acid 500 milliGRAM(s) Oral daily  aspirin enteric coated 81 milliGRAM(s) Oral daily  bacitracin   Ointment 1 Application(s) Topical every 12 hours  bisacodyl 5 milliGRAM(s) Oral every 12 hours  bisacodyl Suppository 10 milliGRAM(s) Rectal once  chlorhexidine 2% Cloths 1 Application(s) Topical daily  clopidogrel Tablet 75 milliGRAM(s) Oral daily  dextrose 5%. 1000 milliLiter(s) (50 mL/Hr) IV Continuous <Continuous>  dextrose 5%. 1000 milliLiter(s) (100 mL/Hr) IV Continuous <Continuous>  dextrose 50% Injectable 50 milliLiter(s) IV Push every 15 minutes  dextrose 50% Injectable 25 milliLiter(s) IV Push every 15 minutes  famotidine    Tablet 20 milliGRAM(s) Oral every 12 hours  fluticasone propionate/ salmeterol 100-50 MICROgram(s) Diskus 1 Dose(s) Inhalation two times a day  folic acid 1 milliGRAM(s) Oral daily  furosemide   Injectable 20 milliGRAM(s) IV Push two times a day  gabapentin 100 milliGRAM(s) Oral every 8 hours  glucagon  Injectable 1 milliGRAM(s) IntraMuscular once  heparin   Injectable 5000 Unit(s) SubCutaneous every 12 hours  insulin lispro (ADMELOG) corrective regimen sliding scale   SubCutaneous three times a day before meals  insulin lispro (ADMELOG) corrective regimen sliding scale   SubCutaneous at bedtime  lidocaine   4% Patch 1 Patch Transdermal daily  melatonin 5 milliGRAM(s) Oral at bedtime  metoprolol tartrate 25 milliGRAM(s) Oral every 8 hours  potassium chloride    Tablet ER 20 milliEquivalent(s) Oral daily    MEDICATIONS  (PRN):  aluminum hydroxide/magnesium hydroxide/simethicone Suspension 30 milliLiter(s) Oral every 4 hours PRN Dyspepsia  dextrose Oral Gel 15 Gram(s) Oral once PRN Blood Glucose LESS THAN 70 milliGRAM(s)/deciliter  diltiazem Injectable 10 milliGRAM(s) IV Push every 2 hours PRN HR >110  hydrALAZINE Injectable 10 milliGRAM(s) IV Push every 1 hour PRN SBP>150  ipratropium    for Nebulization 500 MICROGram(s) Nebulizer every 6 hours PRN Shortness of Breath  labetalol Injectable 10 milliGRAM(s) IV Push every 2 hours PRN Diastolic blood pressure > 150  ondansetron Injectable 4 milliGRAM(s) IV Push every 6 hours PRN Nausea and/or Vomiting  oxyCODONE    IR 10 milliGRAM(s) Oral every 6 hours PRN Severe Pain (7 - 10)  oxyCODONE    IR 5 milliGRAM(s) Oral every 4 hours PRN Moderate Pain (4 - 6)    Allergies    No Known Allergies    Intolerances      Ambulation/Activity Status: ambulate as tolerated    Assessment/Plan:  59y Male s/p CABG x4     POD #10   - Case and plan discussed with CTU Intensivist and CT Surgeon - Dr. Martin  - Continue CTU supportive care and ongoing plan of care as per continuing CTU rounds.   - Continue DVT/GI prophylaxis  - Incentive Spirometry 10 times an hour  - Continue to advance physical activity as tolerated and continue PT/OT as directed  1. CAD s/p CABG: Continue ASA, and BB. Pain control prn; STATIN held again due to elevated lft's  2. Anemia, 2/2 acute po blood loss: H/H has been stable, continue to monitor. continue Venofer, ferrous sulfate and folic acid.   3. HTN- cont Norvasc and bb  4. Post-op A. Fib ppx: monitor electrolytes, converted to sr with amio and procan. LFTs downtrending and QTc improved today, will restart Amiodarone 400 mg bid  5. COPD/Hypoxia: expected post op pulmonary insuffiencey Continue high flow O2, duoneb q6hr, advair. Keep O2sat >92%. Encourage incentive spirometry.   6. lasix for noncardiogenic fluid overload  and try to wean to room air, till trend pulse ox, likely will need home O2.      Social Service Disposition:  Anticipate home likely with O2   OPERATIVE PROCEDURE(s):                POD # 10 CABG                      59yMale  SURGEON(s): YUDELKA Martin  SUBJECTIVE ASSESSMENT: feeling better    Vital Signs Last 24 Hrs  T(F): 96.8 (01 Mar 2025 04:00), Max: 100.4 (28 Feb 2025 22:00)  HR: 95 (01 Mar 2025 07:00) (74 - 96)  BP: 105/65 (01 Mar 2025 07:00) (87/52 - 148/76)  BP(mean): 80 (01 Mar 2025 07:00) (64 - 101)  RR: 31 (01 Mar 2025 07:00) (18 - 32)  SpO2: 96% (01 Mar 2025 07:00) (92% - 98%)      I&O's Detail    28 Feb 2025 07:01  -  01 Mar 2025 07:00  --------------------------------------------------------  IN:    Oral Fluid: 1690 mL  Total IN: 1690 mL    OUT:    Voided (mL): 3460 mL  Total OUT: 3460 mL    Net:   I&O's Detail    27 Feb 2025 07:01  -  28 Feb 2025 07:00  --------------------------------------------------------  Total NET: -610 mL    28 Feb 2025 07:01  -  01 Mar 2025 07:00  --------------------------------------------------------  Total NET: -1770 mL        CAPILLARY BLOOD GLUCOSE      POCT Blood Glucose.: 136 mg/dL (01 Mar 2025 06:51)  POCT Blood Glucose.: 134 mg/dL (28 Feb 2025 21:47)  POCT Blood Glucose.: 166 mg/dL (28 Feb 2025 16:47)  POCT Blood Glucose.: 115 mg/dL (28 Feb 2025 11:02)    Physical Exam:  General: NAD; A&Ox3  Cardiac: S1/S2, RRR, no murmur, no rubs  Lungs: unlabored shallow respirations, bilateral bs decreased at bases  Abdomen: Soft/NT/protuberant  Sternum: Intact, no click, incision healing well with no drainage  Incisions: Incisions clean/dry/intact, + ecchymosis right thigh  Extremities: mild  edema b/l lower extremities    BOWEL MOVEMENT:  [] YES     LABS:                        8.3[L]  9.09  )-----------( 347      ( 01 Mar 2025 04:00 )             25.7[L]                        8.5[L]  11.24[H] )-----------( 344      ( 28 Feb 2025 05:15 )             25.7[L]    03-01    132[L]  |  93[L]  |  28[H]  ----------------------------<  109[H]  3.8   |  30  |  1.3  02-28    127[L]  |  91[L]  |  33[H]  ----------------------------<  111[H]  3.7   |  28  |  1.3    Ca    8.2[L]      01 Mar 2025 04:00  Mg     2.1     03-01    TPro  6.0 [6.0 - 8.0]  /  Alb  3.1[L] [3.5 - 5.2]  /  TBili  0.6 [0.2 - 1.2]  /  DBili  x   /  AST  100[H] [0 - 41]  /  ALT  158[H] [0 - 41]  /  AlkPhos  66 [30 - 115]  03-01      RADIOLOGY & ADDITIONAL TESTS:  CXR:  < from: Xray Chest 1 View- PORTABLE-Routine (Xray Chest 1 View- PORTABLE-Routine in AM.) (03.01.25 @ 05:25) >  IMPRESSION:    Unchanged bibasilar opacities and effusions.    --- End of Report ---      < end of copied text >    EKG:   < from: 12 Lead ECG (02.28.25 @ 07:37) >  Ventricular Rate 77 BPM    Atrial Rate 77 BPM    P-R Interval 208 ms    QRS Duration 98 ms    Q-T Interval 450 ms    QTC Calculation(Bazett) 509 ms    P Axis 66 degrees    R Axis 36 degrees    T Axis 44 degrees    Diagnosis Line Normal sinus rhythm  Low voltage QRS  Cannot rule out Anterior infarct , age undetermined  Prolonged QT  Abnormal ECG    Confirmed by Anthony Abbasi ([id number]) on 2/28/2025 3:58:30 PM    EKG today , NSR with     MEDICATIONS  (STANDING):  ascorbic acid 500 milliGRAM(s) Oral daily  aspirin enteric coated 81 milliGRAM(s) Oral daily  bacitracin   Ointment 1 Application(s) Topical every 12 hours  bisacodyl 5 milliGRAM(s) Oral every 12 hours  bisacodyl Suppository 10 milliGRAM(s) Rectal once  chlorhexidine 2% Cloths 1 Application(s) Topical daily  clopidogrel Tablet 75 milliGRAM(s) Oral daily  dextrose 5%. 1000 milliLiter(s) (50 mL/Hr) IV Continuous <Continuous>  dextrose 5%. 1000 milliLiter(s) (100 mL/Hr) IV Continuous <Continuous>  dextrose 50% Injectable 50 milliLiter(s) IV Push every 15 minutes  dextrose 50% Injectable 25 milliLiter(s) IV Push every 15 minutes  famotidine    Tablet 20 milliGRAM(s) Oral every 12 hours  fluticasone propionate/ salmeterol 100-50 MICROgram(s) Diskus 1 Dose(s) Inhalation two times a day  folic acid 1 milliGRAM(s) Oral daily  furosemide   Injectable 20 milliGRAM(s) IV Push two times a day  gabapentin 100 milliGRAM(s) Oral every 8 hours  glucagon  Injectable 1 milliGRAM(s) IntraMuscular once  heparin   Injectable 5000 Unit(s) SubCutaneous every 12 hours  insulin lispro (ADMELOG) corrective regimen sliding scale   SubCutaneous three times a day before meals  insulin lispro (ADMELOG) corrective regimen sliding scale   SubCutaneous at bedtime  lidocaine   4% Patch 1 Patch Transdermal daily  melatonin 5 milliGRAM(s) Oral at bedtime  metoprolol tartrate 25 milliGRAM(s) Oral every 8 hours  potassium chloride    Tablet ER 20 milliEquivalent(s) Oral daily    MEDICATIONS  (PRN):  aluminum hydroxide/magnesium hydroxide/simethicone Suspension 30 milliLiter(s) Oral every 4 hours PRN Dyspepsia  dextrose Oral Gel 15 Gram(s) Oral once PRN Blood Glucose LESS THAN 70 milliGRAM(s)/deciliter  diltiazem Injectable 10 milliGRAM(s) IV Push every 2 hours PRN HR >110  hydrALAZINE Injectable 10 milliGRAM(s) IV Push every 1 hour PRN SBP>150  ipratropium    for Nebulization 500 MICROGram(s) Nebulizer every 6 hours PRN Shortness of Breath  labetalol Injectable 10 milliGRAM(s) IV Push every 2 hours PRN Diastolic blood pressure > 150  ondansetron Injectable 4 milliGRAM(s) IV Push every 6 hours PRN Nausea and/or Vomiting  oxyCODONE    IR 10 milliGRAM(s) Oral every 6 hours PRN Severe Pain (7 - 10)  oxyCODONE    IR 5 milliGRAM(s) Oral every 4 hours PRN Moderate Pain (4 - 6)    Allergies    No Known Allergies    Intolerances      Ambulation/Activity Status: ambulate as tolerated    Assessment/Plan:  59y Male s/p CABG x4     POD #10   - Case and plan discussed with CTU Intensivist and CT Surgeon - Dr. Martin  - Continue CTU supportive care and ongoing plan of care as per continuing CTU rounds.   - Continue DVT/GI prophylaxis  - Incentive Spirometry 10 times an hour  - Continue to advance physical activity as tolerated and continue PT/OT as directed  1. CAD s/p CABG: Continue ASA, and BB. Pain control prn; STATIN held again due to elevated lft's  2. Anemia, 2/2 acute po blood loss: H/H has been stable, continue to monitor, ferrous sulfate and folic acid.   3. HTN- cont Norvasc and bb  4. Post-op A. Fib ppx: monitor electrolytes, converted to sr with amio and procan. LFTs downtrending and QTc improved today, will restart Amiodarone 400 mg bid for 3 days  5. COPD/Hypoxia: expected post op pulmonary insuffiencey Continue high flow O2, duoneb q6hr, advair. Keep O2sat >92%. Encourage incentive spirometry.   6. lasix for noncardiogenic fluid overload  and try to wean to room air, trend pulse ox, may need home O2.  7. Pre Diabetes - A1C 6.0 - carb consistent diet       Social Service Disposition:  Anticipate home with possible O2 on Monday

## 2025-03-02 LAB
ALBUMIN SERPL ELPH-MCNC: 3 G/DL — LOW (ref 3.5–5.2)
ALP SERPL-CCNC: 60 U/L — SIGNIFICANT CHANGE UP (ref 30–115)
ALT FLD-CCNC: 130 U/L — HIGH (ref 0–41)
ANION GAP SERPL CALC-SCNC: 9 MMOL/L — SIGNIFICANT CHANGE UP (ref 7–14)
AST SERPL-CCNC: 67 U/L — HIGH (ref 0–41)
BILIRUB SERPL-MCNC: 0.6 MG/DL — SIGNIFICANT CHANGE UP (ref 0.2–1.2)
BUN SERPL-MCNC: 24 MG/DL — HIGH (ref 10–20)
CALCIUM SERPL-MCNC: 8.2 MG/DL — LOW (ref 8.4–10.5)
CHLORIDE SERPL-SCNC: 94 MMOL/L — LOW (ref 98–110)
CO2 SERPL-SCNC: 30 MMOL/L — SIGNIFICANT CHANGE UP (ref 17–32)
CREAT SERPL-MCNC: 1.3 MG/DL — SIGNIFICANT CHANGE UP (ref 0.7–1.5)
EGFR: 62 ML/MIN/1.73M2 — SIGNIFICANT CHANGE UP
EGFR: 62 ML/MIN/1.73M2 — SIGNIFICANT CHANGE UP
GLUCOSE SERPL-MCNC: 110 MG/DL — HIGH (ref 70–99)
HCT VFR BLD CALC: 25.8 % — LOW (ref 42–52)
HGB BLD-MCNC: 8.4 G/DL — LOW (ref 14–18)
MAGNESIUM SERPL-MCNC: 1.7 MG/DL — LOW (ref 1.8–2.4)
MCHC RBC-ENTMCNC: 29.8 PG — SIGNIFICANT CHANGE UP (ref 27–31)
MCHC RBC-ENTMCNC: 32.6 G/DL — SIGNIFICANT CHANGE UP (ref 32–37)
MCV RBC AUTO: 91.5 FL — SIGNIFICANT CHANGE UP (ref 80–94)
NRBC BLD AUTO-RTO: 0 /100 WBCS — SIGNIFICANT CHANGE UP (ref 0–0)
PLATELET # BLD AUTO: 422 K/UL — HIGH (ref 130–400)
PMV BLD: 10.1 FL — SIGNIFICANT CHANGE UP (ref 7.4–10.4)
POTASSIUM SERPL-MCNC: 3.9 MMOL/L — SIGNIFICANT CHANGE UP (ref 3.5–5)
POTASSIUM SERPL-SCNC: 3.9 MMOL/L — SIGNIFICANT CHANGE UP (ref 3.5–5)
PROT SERPL-MCNC: 6.1 G/DL — SIGNIFICANT CHANGE UP (ref 6–8)
RBC # BLD: 2.82 M/UL — LOW (ref 4.7–6.1)
RBC # FLD: 16.1 % — HIGH (ref 11.5–14.5)
SODIUM SERPL-SCNC: 133 MMOL/L — LOW (ref 135–146)
WBC # BLD: 9.86 K/UL — SIGNIFICANT CHANGE UP (ref 4.8–10.8)
WBC # FLD AUTO: 9.86 K/UL — SIGNIFICANT CHANGE UP (ref 4.8–10.8)

## 2025-03-02 PROCEDURE — 99291 CRITICAL CARE FIRST HOUR: CPT | Mod: 24

## 2025-03-02 PROCEDURE — 71045 X-RAY EXAM CHEST 1 VIEW: CPT | Mod: 26

## 2025-03-02 RX ORDER — METOCLOPRAMIDE HCL 10 MG
10 TABLET ORAL ONCE
Refills: 0 | Status: DISCONTINUED | OUTPATIENT
Start: 2025-03-02 | End: 2025-03-03

## 2025-03-02 RX ORDER — MAGNESIUM SULFATE 500 MG/ML
2 SYRINGE (ML) INJECTION ONCE
Refills: 0 | Status: COMPLETED | OUTPATIENT
Start: 2025-03-02 | End: 2025-03-02

## 2025-03-02 RX ORDER — ATORVASTATIN CALCIUM 80 MG/1
20 TABLET, FILM COATED ORAL AT BEDTIME
Refills: 0 | Status: DISCONTINUED | OUTPATIENT
Start: 2025-03-02 | End: 2025-03-02

## 2025-03-02 RX ADMIN — Medication 1 APPLICATION(S): at 06:49

## 2025-03-02 RX ADMIN — Medication 25 GRAM(S): at 06:50

## 2025-03-02 RX ADMIN — Medication 81 MILLIGRAM(S): at 13:03

## 2025-03-02 RX ADMIN — Medication 1 APPLICATION(S): at 06:59

## 2025-03-02 RX ADMIN — METOPROLOL SUCCINATE 25 MILLIGRAM(S): 50 TABLET, EXTENDED RELEASE ORAL at 14:38

## 2025-03-02 RX ADMIN — Medication 20 MILLIGRAM(S): at 17:43

## 2025-03-02 RX ADMIN — METOPROLOL SUCCINATE 25 MILLIGRAM(S): 50 TABLET, EXTENDED RELEASE ORAL at 21:19

## 2025-03-02 RX ADMIN — GABAPENTIN 100 MILLIGRAM(S): 400 CAPSULE ORAL at 13:03

## 2025-03-02 RX ADMIN — GABAPENTIN 100 MILLIGRAM(S): 400 CAPSULE ORAL at 06:50

## 2025-03-02 RX ADMIN — Medication 20 MILLIGRAM(S): at 06:50

## 2025-03-02 RX ADMIN — HEPARIN SODIUM 5000 UNIT(S): 1000 INJECTION INTRAVENOUS; SUBCUTANEOUS at 17:42

## 2025-03-02 RX ADMIN — Medication 500 MILLIGRAM(S): at 13:03

## 2025-03-02 RX ADMIN — Medication 5 MILLIGRAM(S): at 21:20

## 2025-03-02 RX ADMIN — AMIODARONE HYDROCHLORIDE 400 MILLIGRAM(S): 50 INJECTION, SOLUTION INTRAVENOUS at 17:42

## 2025-03-02 RX ADMIN — Medication 1 DOSE(S): at 21:20

## 2025-03-02 RX ADMIN — FUROSEMIDE 20 MILLIGRAM(S): 10 INJECTION INTRAMUSCULAR; INTRAVENOUS at 06:49

## 2025-03-02 RX ADMIN — CLOPIDOGREL BISULFATE 75 MILLIGRAM(S): 75 TABLET, FILM COATED ORAL at 13:03

## 2025-03-02 RX ADMIN — AMIODARONE HYDROCHLORIDE 400 MILLIGRAM(S): 50 INJECTION, SOLUTION INTRAVENOUS at 06:49

## 2025-03-02 RX ADMIN — HEPARIN SODIUM 5000 UNIT(S): 1000 INJECTION INTRAVENOUS; SUBCUTANEOUS at 06:49

## 2025-03-02 RX ADMIN — METOPROLOL SUCCINATE 25 MILLIGRAM(S): 50 TABLET, EXTENDED RELEASE ORAL at 06:49

## 2025-03-02 RX ADMIN — LIDOCAINE HYDROCHLORIDE 1 PATCH: 20 JELLY TOPICAL at 19:34

## 2025-03-02 RX ADMIN — Medication 30 MILLILITER(S): at 14:30

## 2025-03-02 RX ADMIN — LIDOCAINE HYDROCHLORIDE 1 PATCH: 20 JELLY TOPICAL at 13:04

## 2025-03-02 RX ADMIN — GABAPENTIN 100 MILLIGRAM(S): 400 CAPSULE ORAL at 21:20

## 2025-03-02 RX ADMIN — FUROSEMIDE 20 MILLIGRAM(S): 10 INJECTION INTRAMUSCULAR; INTRAVENOUS at 13:04

## 2025-03-02 RX ADMIN — Medication 40 MILLIEQUIVALENT(S): at 06:49

## 2025-03-02 RX ADMIN — Medication 20 MILLIEQUIVALENT(S): at 13:03

## 2025-03-02 RX ADMIN — FOLIC ACID 1 MILLIGRAM(S): 1 TABLET ORAL at 13:04

## 2025-03-02 NOTE — PROGRESS NOTE ADULT - SUBJECTIVE AND OBJECTIVE BOX
CTU Attending Progress Daily Note     01 Mar 2025 16:22  Procedure: CABG (C4L)  POD#: 11    He has history of Hypertension    Gallstones    History of abdominal aortic aneurysm (AAA)    Obesity    CAD (coronary artery disease)    History of hepatitis C    HPI:   59y Male PMH of HTN, HLD, nephrolithiasis, GERD, Cholecystitis (s/p lap abdulaziz), AAA without prior surgical intervention, obesity, former smoker (smoked for 40 years, quit 3 years ago), chronic back pain. Patient reported SOB when going up 2 flights of stairs or when walking at a fast pace. CCTA revealed CAC of 847 with multivessel disease, followed by CT Fractional flow Reserve (FFR-CT) confirming findings. Presented today for elective LHC which demonstrated Left main with multi-vessel involvement. On 02/19/25, he presented for elective CABG.     OR Data  Procedure: C4L  Bypass: 160  Cross Clamp: None   Pre LV Fxn: 50-55%      RV Fxn: nml  Post LV Fxn: 50-55%     RV Fxn: nml  Blood Products: pltsx2, cryox2  Cell Saver: 942 cc  Fluids: 2.5L  Pacing Wires: v wires    Hospital Course:  2/22-25 multiple episodes of afib. given amio, procainamide, metoprolol, digoxin  2/25 furosemide d/c due to cr bump  2/26 gentle diuresis  3/1: In and out of afib; restarted Amio (QTC 400s)    OBJECTIVE:  ICU Vital Signs Last 24 Hrs  T(C): 37.4 (02 Mar 2025 04:00), Max: 37.9 (01 Mar 2025 20:00)  T(F): 99.4 (02 Mar 2025 04:00), Max: 100.2 (01 Mar 2025 20:00)  HR: 81 (02 Mar 2025 12:00) (73 - 94)  BP: 115/63 (02 Mar 2025 12:00) (95/65 - 143/97)  BP(mean): 84 (02 Mar 2025 12:00) (73 - 112)  ABP: --  ABP(mean): --  RR: 22 (02 Mar 2025 12:00) (20 - 30)  SpO2: 97% (02 Mar 2025 12:00) (93% - 98%)    O2 Parameters below as of 02 Mar 2025 12:00  Patient On (Oxygen Delivery Method): room air          I&O's Summary    01 Mar 2025 07:01  -  02 Mar 2025 07:00  --------------------------------------------------------  IN: 1440 mL / OUT: 1545 mL / NET: -105 mL    02 Mar 2025 07:01  -  02 Mar 2025 13:07  --------------------------------------------------------  IN: 0 mL / OUT: 400 mL / NET: -400 mL      I&O's Detail    01 Mar 2025 07:01  -  02 Mar 2025 07:00  --------------------------------------------------------  IN:    Oral Fluid: 1440 mL  Total IN: 1440 mL    OUT:    Voided (mL): 1545 mL  Total OUT: 1545 mL    Total NET: -105 mL      02 Mar 2025 07:01  -  02 Mar 2025 13:07  --------------------------------------------------------  IN:  Total IN: 0 mL    OUT:    Voided (mL): 400 mL  Total OUT: 400 mL    Total NET: -400 mL        Adult Advanced Hemodynamics Last 24 Hrs  CVP(mm Hg): --  CVP(cm H2O): --  CO: --  CI: --  PA: --  PA(mean): --  PCWP: --  SVR: --  SVRI: --  PVR: --  PVRI: --    CAPILLARY BLOOD GLUCOSE        LABS:                          8.4    9.86  )-----------( 422      ( 02 Mar 2025 04:00 )             25.8     03-02    133[L]  |  94[L]  |  24[H]  ----------------------------<  110[H]  3.9   |  30  |  1.3    Ca    8.2[L]      02 Mar 2025 04:00  Mg     1.7     03-02    TPro  6.1  /  Alb  3.0[L]  /  TBili  0.6  /  DBili  x   /  AST  67[H]  /  ALT  130[H]  /  AlkPhos  60  03-02      Urinalysis Basic - ( 02 Mar 2025 04:00 )    Color: x / Appearance: x / SG: x / pH: x  Gluc: 110 mg/dL / Ketone: x  / Bili: x / Urobili: x   Blood: x / Protein: x / Nitrite: x   Leuk Esterase: x / RBC: x / WBC x   Sq Epi: x / Non Sq Epi: x / Bacteria: x        Home Medications:  amLODIPine 5 mg oral tablet: 1 tab(s) orally 2 times a day (19 Feb 2025 06:54)  aspirin 81 mg oral tablet: orally once a day (19 Feb 2025 06:54)  clopidogrel 75 mg oral tablet: 1 tab(s) orally once a day (19 Feb 2025 06:54)  MELOXICAM 15 MG TABLET:  (19 Feb 2025 06:54)  metoprolol succinate 50 mg oral capsule, extended release: 1 cap(s) orally once a day (at bedtime) (19 Feb 2025 06:54)  omeprazole 40 mg oral delayed release capsule: 1 cap(s) orally once a day (19 Feb 2025 06:54)  rosuvastatin 20 mg oral tablet: 1 tab(s) orally once a day (at bedtime) (19 Feb 2025 06:54)  valsartan 160 mg oral tablet: 1 tab(s) orally once a day (19 Feb 2025 06:54)    HOSPITAL MEDICATIONS:  MEDICATIONS  (STANDING):  aMIOdarone    Tablet 400 milliGRAM(s) Oral two times a day  ascorbic acid 500 milliGRAM(s) Oral daily  aspirin enteric coated 81 milliGRAM(s) Oral daily  bisacodyl 5 milliGRAM(s) Oral every 12 hours  chlorhexidine 2% Cloths 1 Application(s) Topical daily  clopidogrel Tablet 75 milliGRAM(s) Oral daily  famotidine    Tablet 20 milliGRAM(s) Oral every 12 hours  fluticasone propionate/ salmeterol 100-50 MICROgram(s) Diskus 1 Dose(s) Inhalation two times a day  folic acid 1 milliGRAM(s) Oral daily  furosemide   Injectable 20 milliGRAM(s) IV Push two times a day  gabapentin 100 milliGRAM(s) Oral every 8 hours  heparin   Injectable 5000 Unit(s) SubCutaneous every 12 hours  lidocaine   4% Patch 1 Patch Transdermal daily  melatonin 5 milliGRAM(s) Oral at bedtime  metoprolol tartrate 25 milliGRAM(s) Oral every 8 hours  potassium chloride    Tablet ER 20 milliEquivalent(s) Oral daily    MEDICATIONS  (PRN):  aluminum hydroxide/magnesium hydroxide/simethicone Suspension 30 milliLiter(s) Oral every 4 hours PRN Dyspepsia  hydrALAZINE Injectable 10 milliGRAM(s) IV Push every 1 hour PRN SBP>150  ipratropium    for Nebulization 500 MICROGram(s) Nebulizer every 6 hours PRN Shortness of Breath  oxyCODONE    IR 5 milliGRAM(s) Oral every 4 hours PRN Moderate Pain (4 - 6)    RADIOLOGY:  Chest X-ray Reviewed    REVIEW OF SYSTEMS:  CONSTITUTIONAL: [X] all negative; [ ] weakness, [ ] fevers, [ ] chills  EYES/ENT: [X] all negative; [ ] visual changes, [ ] vertigo, [ ] throat pain   NECK: [X] all negative; [ ] pain, [ ] stiffness  RESPIRATORY: [] all negative, [ ] cough, [ ] wheezing, [ ] hemoptysis, [ ] shortness of breath  CARDIOVASCULAR: [] all negative; [ ] chest pain, [ ] palpitations, [ ] orthopnea  GASTROINTESTINAL: [X] all negative; [ ]abdominal pain, [ ] nausea, [ ] vomiting, [ ] hematemesis, [ ] diarrhea, [ ] constipation, [ ] melena, [ ] hematochezia.  GENITOURINARY: [X] all negative; [ ] dysuria, [ ] frequency, [ ] hematuria  NEUROLOGICAL: [X] all negative; [ ] numbness, [ ] weakness  SKIN: [X] all negative; [ ] itching, [ ] burning, [ ] rashes, [ ] lesions   All other review of systems is negative unless indicated above.  [  ] Unable to assess ROS because     PHYSICAL EXAM:              CONSTITUTIONAL: No acute distress  	SKIN: warm, dry  	EYES: PERRL, EOM, no conj injection, sclera clear  	NECK: JVP mid neck   	CARD: Regular rate and rhythm, no murmurs, rubs or gallops  	RESP: CTA B/L; no wheezes, rales or rhonchi.  	ABD: Soft, not tender, not distended, bowel sounds present  	EXT: 2+ pulses on all extremities, no clubbing, cyanosis or edema  	NEURO: A+Ox3, strength intact throughout    Assessment   s/p CABG POD 11 currently progressing well with recovery and optimization for discharge.     Plan:    Neuro:  Multimodal pain management  Tylenol, Lidoderm patch, gabapentin, opiates as needed  Monitor neuro status in the post op period    CV:  S/P CABG  Monitor perfusion, lactate, UOP,   Maintain MAP > 65  ASA, Plavix statin  Amio Restarted for AF with RVR  Lopressor 25 TID - increase as tolerated  Will need AC for Afib    Resp:  Room air, sats > 92%  Continuous pulse oximetry monitoring  IS / Chest PT  OOBTC and Ambulate  Nebulizers as needed    GI:  Heart healthy diet  Bowel Regimen     Renal  Creatinine acceptable  Monitor UOP, lytes, creatinine  Continue diuresis with IV Lasix  Keep K > 4, Mg > 2    Endo:  Tight glucose control per CTICU protocl  Not requiring Insulin    Heme:  Acute blood loss anemia post surgery  DVT prophylaxis with Heparin subQ    ID:  Monitor fever curve and WBC count      Upon my evaluation, the above patient has a high probability of imminent or life threatening deterioration due to a high risk for Shock, Cardiogenic shock, arrythmia, acute blood loss, acute kidney injury and acute pulmonary insufficiency which required my direct attention, intervention, and personal management.  Total critical care time indicated in the note includes review of radiology results, ordering, interpreting and reviewing diagnostic studies / lab tests with immediate assessment and treatment, consultant collaboration on findings and treatment options, monitoring for potential decompensation, obtaining history from family, EMT, nursing home staff and/or treating physicians; directing the titration of pressors, oxygen support devices, fluid resuscitation, interpretation of cardiac output measurements, interpretation of radiological assessments, interpretation of EKG / rhythm strips, telemetry.  This time did not overlap with the management of other patients or performing separately reportable procedures.      Management of this patient was discussed with the CT Surgery/Thoracic surgery/Structural Cardiology attending and mid-level providers.    I acknowledge the use of copied documentation.  All copy forward documentation is my own and has been reviewed and revised as appropriate.  If no changes were made, it is because that information remains the same.

## 2025-03-02 NOTE — PROGRESS NOTE ADULT - SUBJECTIVE AND OBJECTIVE BOX
OPERATIVE PROCEDURE(s):                POD #  11 CABG                     59yMale  SURGEON(s): YUDELKA Martin  SUBJECTIVE ASSESSMENT: wants to go home    Vital Signs Last 24 Hrs  T(F): 99.4 (02 Mar 2025 04:00), Max: 100.2 (01 Mar 2025 20:00)  HR: 83 (02 Mar 2025 07:00) (73 - 117)  BP: 112/68 (02 Mar 2025 07:00) (95/65 - 143/97)  BP(mean): 86 (02 Mar 2025 07:00) (73 - 112)  RR: 23 (02 Mar 2025 07:00) (18 - 30)  SpO2: 95% (02 Mar 2025 07:00) (93% - 97%)    I&O's Detail    01 Mar 2025 07:01  -  02 Mar 2025 07:00  --------------------------------------------------------  IN:    Oral Fluid: 1440 mL  Total IN: 1440 mL    OUT:    Voided (mL): 1545 mL  Total OUT: 1545 mL        Net:   I&O's Detail    28 Feb 2025 07:01  -  01 Mar 2025 07:00  --------------------------------------------------------  Total NET: -1770 mL      01 Mar 2025 07:01  -  02 Mar 2025 07:00  --------------------------------------------------------  Total NET: -105 mL        CAPILLARY BLOOD GLUCOSE      POCT Blood Glucose.: 127 mg/dL (01 Mar 2025 11:35)    Physical Exam:  General: NAD; A&Ox3  Cardiac: S1/S2, RRR, no murmur, no rubs  Lungs: unlabored shallow respirations, bilateral bs decreased at bases  Abdomen: Soft/NT/protuberant  Sternum: Intact, no click, incision healing well with no drainage  Incisions: Incisions clean/dry/intact, + ecchymosis right thigh  Extremities: mild  edema b/l lower extremities    BOWEL MOVEMENT:  [] YES         LABS:                        8.4[L]  9.86  )-----------( 422[H]    ( 02 Mar 2025 04:00 )             25.8[L]                        8.3[L]  9.09  )-----------( 347      ( 01 Mar 2025 04:00 )             25.7[L]    03-02    133[L]  |  94[L]  |  24[H]  ----------------------------<  110[H]  3.9   |  30  |  1.3  03-01    132[L]  |  93[L]  |  28[H]  ----------------------------<  109[H]  3.8   |  30  |  1.3    Ca    8.2[L]      02 Mar 2025 04:00  Mg     1.7     03-02    TPro  6.1 [6.0 - 8.0]  /  Alb  3.0[L] [3.5 - 5.2]  /  TBili  0.6 [0.2 - 1.2]  /  DBili  x   /  AST  67[H] [0 - 41]  /  ALT  130[H] [0 - 41]  /  AlkPhos  60 [30 - 115]  03-02      RADIOLOGY & ADDITIONAL TESTS:  CXR:  EKG:  MEDICATIONS  (STANDING):  aMIOdarone    Tablet 400 milliGRAM(s) Oral two times a day  ascorbic acid 500 milliGRAM(s) Oral daily  aspirin enteric coated 81 milliGRAM(s) Oral daily  atorvastatin 20 milliGRAM(s) Oral at bedtime  bacitracin   Ointment 1 Application(s) Topical every 12 hours  bisacodyl 5 milliGRAM(s) Oral every 12 hours  chlorhexidine 2% Cloths 1 Application(s) Topical daily  clopidogrel Tablet 75 milliGRAM(s) Oral daily  famotidine    Tablet 20 milliGRAM(s) Oral every 12 hours  fluticasone propionate/ salmeterol 100-50 MICROgram(s) Diskus 1 Dose(s) Inhalation two times a day  folic acid 1 milliGRAM(s) Oral daily  furosemide   Injectable 20 milliGRAM(s) IV Push two times a day  gabapentin 100 milliGRAM(s) Oral every 8 hours  heparin   Injectable 5000 Unit(s) SubCutaneous every 12 hours  lidocaine   4% Patch 1 Patch Transdermal daily  melatonin 5 milliGRAM(s) Oral at bedtime  metoprolol tartrate 25 milliGRAM(s) Oral every 8 hours  potassium chloride    Tablet ER 20 milliEquivalent(s) Oral daily    MEDICATIONS  (PRN):  aluminum hydroxide/magnesium hydroxide/simethicone Suspension 30 milliLiter(s) Oral every 4 hours PRN Dyspepsia  hydrALAZINE Injectable 10 milliGRAM(s) IV Push every 1 hour PRN SBP>150  ipratropium    for Nebulization 500 MICROGram(s) Nebulizer every 6 hours PRN Shortness of Breath  oxyCODONE    IR 10 milliGRAM(s) Oral every 6 hours PRN Severe Pain (7 - 10)  oxyCODONE    IR 5 milliGRAM(s) Oral every 4 hours PRN Moderate Pain (4 - 6)    Allergies    No Known Allergies    Intolerances      Ambulation/Activity Status: ambulate as tolerated    Assessment/Plan:  59y Male s/p CABG x4     POD #11  - Case and plan discussed with CTU Intensivist and CT Surgeon - Dr. Martin  - Continue CTU supportive care and ongoing plan of care as per continuing CTU rounds.   - Continue DVT/GI prophylaxis  - Incentive Spirometry 10 times an hour  - Continue to advance physical activity as tolerated and continue PT/OT as directed  1. CAD s/p CABG: Continue ASA, and BB. Pain control prn; start STATIN held again due to elevated lft's  2. Anemia, 2/2 acute po blood loss: H/H has been stable, continue to monitor, ferrous sulfate and folic acid.   3. HTN- cont Norvasc and bb  4. Post-op A. Fib ppx: monitor electrolytes, converted to sr with amio and procan. LFTs downtrending and QTc improved today, will continue Amiodarone 400 mg bid for 2 days  5. COPD/Hypoxia: expected post op pulmonary insuffiencey Continue high flow O2 as needed, duoneb q6hr, advair. Keep O2sat >92%. Encourage incentive spirometry.   6. lasix for noncardiogenic fluid overload  and try to wean to room air, trend pulse ox, may need home O2.  7. Pre Diabetes - A1C 6.0 - carb consistent diet   8. PA and lateral in AM      Social Service Disposition:  Anticipate home with possible O2 on Monday   OPERATIVE PROCEDURE(s):                POD #  11 CABG                     59yMale  SURGEON(s): YUDELKA Martin  SUBJECTIVE ASSESSMENT: wants to go home    Vital Signs Last 24 Hrs  T(F): 99.4 (02 Mar 2025 04:00), Max: 100.2 (01 Mar 2025 20:00)  HR: 83 (02 Mar 2025 07:00) (73 - 117)  BP: 112/68 (02 Mar 2025 07:00) (95/65 - 143/97)  BP(mean): 86 (02 Mar 2025 07:00) (73 - 112)  RR: 23 (02 Mar 2025 07:00) (18 - 30)  SpO2: 95% (02 Mar 2025 07:00) (93% - 97%)    I&O's Detail    01 Mar 2025 07:01  -  02 Mar 2025 07:00  --------------------------------------------------------  IN:    Oral Fluid: 1440 mL  Total IN: 1440 mL    OUT:    Voided (mL): 1545 mL  Total OUT: 1545 mL        Net:   I&O's Detail    28 Feb 2025 07:01  -  01 Mar 2025 07:00  --------------------------------------------------------  Total NET: -1770 mL      01 Mar 2025 07:01  -  02 Mar 2025 07:00  --------------------------------------------------------  Total NET: -105 mL        CAPILLARY BLOOD GLUCOSE      POCT Blood Glucose.: 127 mg/dL (01 Mar 2025 11:35)    Physical Exam:  General: NAD; A&Ox3  Cardiac: S1/S2, RRR, no murmur, no rubs  Lungs: unlabored shallow respirations, bilateral bs decreased at bases  Abdomen: Soft/NT/protuberant  Sternum: Intact, no click, incision healing well with no drainage  Incisions: Incisions clean/dry/intact, + ecchymosis right thigh  Extremities: mild  edema b/l lower extremities    BOWEL MOVEMENT:  [] YES         LABS:                        8.4[L]  9.86  )-----------( 422[H]    ( 02 Mar 2025 04:00 )             25.8[L]                        8.3[L]  9.09  )-----------( 347      ( 01 Mar 2025 04:00 )             25.7[L]    03-02    133[L]  |  94[L]  |  24[H]  ----------------------------<  110[H]  3.9   |  30  |  1.3  03-01    132[L]  |  93[L]  |  28[H]  ----------------------------<  109[H]  3.8   |  30  |  1.3    Ca    8.2[L]      02 Mar 2025 04:00  Mg     1.7     03-02    TPro  6.1 [6.0 - 8.0]  /  Alb  3.0[L] [3.5 - 5.2]  /  TBili  0.6 [0.2 - 1.2]  /  DBili  x   /  AST  67[H] [0 - 41]  /  ALT  130[H] [0 - 41]  /  AlkPhos  60 [30 - 115]  03-02      RADIOLOGY & ADDITIONAL TESTS:  CXR: < from: Xray Chest 1 View- PORTABLE-Routine (Xray Chest 1 View- PORTABLE-Routine in AM.) (03.02.25 @ 06:51) >  impression:    Support devices: None.    Cardiac/mediastinum/hilum: No significant change    Skeleton/soft tissues: No significant change.    Lung parenchyma/Pleura: Stable bibasilar opacities. No definite   pneumothorax.    < end of copied text >    EKG: sr qtc 495    MEDICATIONS  (STANDING):  aMIOdarone    Tablet 400 milliGRAM(s) Oral two times a day  ascorbic acid 500 milliGRAM(s) Oral daily  aspirin enteric coated 81 milliGRAM(s) Oral daily  atorvastatin 20 milliGRAM(s) Oral at bedtime  bacitracin   Ointment 1 Application(s) Topical every 12 hours  bisacodyl 5 milliGRAM(s) Oral every 12 hours  chlorhexidine 2% Cloths 1 Application(s) Topical daily  clopidogrel Tablet 75 milliGRAM(s) Oral daily  famotidine    Tablet 20 milliGRAM(s) Oral every 12 hours  fluticasone propionate/ salmeterol 100-50 MICROgram(s) Diskus 1 Dose(s) Inhalation two times a day  folic acid 1 milliGRAM(s) Oral daily  furosemide   Injectable 20 milliGRAM(s) IV Push two times a day  gabapentin 100 milliGRAM(s) Oral every 8 hours  heparin   Injectable 5000 Unit(s) SubCutaneous every 12 hours  lidocaine   4% Patch 1 Patch Transdermal daily  melatonin 5 milliGRAM(s) Oral at bedtime  metoprolol tartrate 25 milliGRAM(s) Oral every 8 hours  potassium chloride    Tablet ER 20 milliEquivalent(s) Oral daily    MEDICATIONS  (PRN):  aluminum hydroxide/magnesium hydroxide/simethicone Suspension 30 milliLiter(s) Oral every 4 hours PRN Dyspepsia  hydrALAZINE Injectable 10 milliGRAM(s) IV Push every 1 hour PRN SBP>150  ipratropium    for Nebulization 500 MICROGram(s) Nebulizer every 6 hours PRN Shortness of Breath  oxyCODONE    IR 10 milliGRAM(s) Oral every 6 hours PRN Severe Pain (7 - 10)  oxyCODONE    IR 5 milliGRAM(s) Oral every 4 hours PRN Moderate Pain (4 - 6)    Allergies    No Known Allergies    Intolerances      Ambulation/Activity Status: ambulate as tolerated    Assessment/Plan:  59y Male s/p CABG x4     POD #11  - Case and plan discussed with CTU Intensivist and CT Surgeon - Dr. Martin  - Continue CTU supportive care and ongoing plan of care as per continuing CTU rounds.   - Continue DVT/GI prophylaxis  - Incentive Spirometry 10 times an hour  - Continue to advance physical activity as tolerated and continue PT/OT as directed  1. CAD s/p CABG: Continue ASA, and BB. Pain control prn; start STATIN held again due to elevated lft's which are trending down  2. Anemia, 2/2 acute po blood loss: H/H has been stable, continue to monitor, ferrous sulfate and folic acid.   3. HTN- cont Norvasc and bb  4. Post-op A. Fib ppx: monitor electrolytes, converted to sr with amio and procan. LFTs downtrending and QTc 495, will continue Amiodarone 400 mg for one more day then convert to 200mg daily, Discuss in AM need for anticoagulation  5. COPD/Hypoxia: expected post op pulmonary insuffiencey resolved on room air, duoneb q6hr, advair. Keep O2sat >92%. Encourage incentive spirometry.   6. lasix for noncardiogenic fluid overload   7. Pre Diabetes - A1C 6.0 - carb consistent diet   8. PA and lateral in AM      Social Service Disposition:  Anticipate home with possible O2 on Monday

## 2025-03-03 VITALS
HEART RATE: 90 BPM | DIASTOLIC BLOOD PRESSURE: 64 MMHG | RESPIRATION RATE: 20 BRPM | SYSTOLIC BLOOD PRESSURE: 112 MMHG | OXYGEN SATURATION: 95 %

## 2025-03-03 PROBLEM — Z86.19 PERSONAL HISTORY OF OTHER INFECTIOUS AND PARASITIC DISEASES: Chronic | Status: ACTIVE | Noted: 2025-02-11

## 2025-03-03 PROBLEM — E66.9 OBESITY, UNSPECIFIED: Chronic | Status: ACTIVE | Noted: 2025-02-11

## 2025-03-03 PROBLEM — I25.10 ATHEROSCLEROTIC HEART DISEASE OF NATIVE CORONARY ARTERY WITHOUT ANGINA PECTORIS: Chronic | Status: ACTIVE | Noted: 2025-02-11

## 2025-03-03 LAB
ALBUMIN SERPL ELPH-MCNC: 3.3 G/DL — LOW (ref 3.5–5.2)
ALP SERPL-CCNC: 60 U/L — SIGNIFICANT CHANGE UP (ref 30–115)
ALT FLD-CCNC: 136 U/L — HIGH (ref 0–41)
ANION GAP SERPL CALC-SCNC: 7 MMOL/L — SIGNIFICANT CHANGE UP (ref 7–14)
AST SERPL-CCNC: 75 U/L — HIGH (ref 0–41)
BILIRUB SERPL-MCNC: 0.6 MG/DL — SIGNIFICANT CHANGE UP (ref 0.2–1.2)
BUN SERPL-MCNC: 22 MG/DL — HIGH (ref 10–20)
CALCIUM SERPL-MCNC: 8 MG/DL — LOW (ref 8.4–10.5)
CHLORIDE SERPL-SCNC: 95 MMOL/L — LOW (ref 98–110)
CO2 SERPL-SCNC: 32 MMOL/L — SIGNIFICANT CHANGE UP (ref 17–32)
CREAT SERPL-MCNC: 1.3 MG/DL — SIGNIFICANT CHANGE UP (ref 0.7–1.5)
EGFR: 62 ML/MIN/1.73M2 — SIGNIFICANT CHANGE UP
EGFR: 62 ML/MIN/1.73M2 — SIGNIFICANT CHANGE UP
GLUCOSE SERPL-MCNC: 110 MG/DL — HIGH (ref 70–99)
MAGNESIUM SERPL-MCNC: 1.9 MG/DL — SIGNIFICANT CHANGE UP (ref 1.8–2.4)
POTASSIUM SERPL-MCNC: 4.5 MMOL/L — SIGNIFICANT CHANGE UP (ref 3.5–5)
POTASSIUM SERPL-SCNC: 4.5 MMOL/L — SIGNIFICANT CHANGE UP (ref 3.5–5)
PROT SERPL-MCNC: 6.1 G/DL — SIGNIFICANT CHANGE UP (ref 6–8)
SODIUM SERPL-SCNC: 134 MMOL/L — LOW (ref 135–146)

## 2025-03-03 PROCEDURE — 93010 ELECTROCARDIOGRAM REPORT: CPT

## 2025-03-03 PROCEDURE — 99291 CRITICAL CARE FIRST HOUR: CPT | Mod: 24

## 2025-03-03 PROCEDURE — 99232 SBSQ HOSP IP/OBS MODERATE 35: CPT | Mod: 24

## 2025-03-03 PROCEDURE — 71046 X-RAY EXAM CHEST 2 VIEWS: CPT | Mod: 26

## 2025-03-03 RX ORDER — FUROSEMIDE 10 MG/ML
20 INJECTION INTRAMUSCULAR; INTRAVENOUS
Refills: 0 | Status: DISCONTINUED | OUTPATIENT
Start: 2025-03-03 | End: 2025-03-03

## 2025-03-03 RX ORDER — FUROSEMIDE 10 MG/ML
1 INJECTION INTRAMUSCULAR; INTRAVENOUS
Qty: 28 | Refills: 0
Start: 2025-03-03 | End: 2025-03-16

## 2025-03-03 RX ORDER — AMIODARONE HYDROCHLORIDE 50 MG/ML
200 INJECTION, SOLUTION INTRAVENOUS
Refills: 0 | Status: DISCONTINUED | OUTPATIENT
Start: 2025-03-03 | End: 2025-03-03

## 2025-03-03 RX ORDER — METOPROLOL SUCCINATE 50 MG/1
1 TABLET, EXTENDED RELEASE ORAL
Qty: 90 | Refills: 0
Start: 2025-03-03 | End: 2025-04-01

## 2025-03-03 RX ORDER — FOLIC ACID 1 MG/1
1 TABLET ORAL
Qty: 0 | Refills: 0 | DISCHARGE
Start: 2025-03-03

## 2025-03-03 RX ORDER — OXYCODONE 10 MG/1
10 TABLET ORAL EVERY 6 HOURS
Qty: 15 | Refills: 0 | Status: ACTIVE | COMMUNITY
Start: 2025-03-03 | End: 1900-01-01

## 2025-03-03 RX ORDER — MAGNESIUM SULFATE 500 MG/ML
2 SYRINGE (ML) INJECTION ONCE
Refills: 0 | Status: COMPLETED | OUTPATIENT
Start: 2025-03-03 | End: 2025-03-03

## 2025-03-03 RX ORDER — AMIODARONE HYDROCHLORIDE 50 MG/ML
1 INJECTION, SOLUTION INTRAVENOUS
Qty: 60 | Refills: 0
Start: 2025-03-03 | End: 2025-04-01

## 2025-03-03 RX ORDER — MAGNESIUM, ALUMINUM HYDROXIDE 200-200 MG
30 TABLET,CHEWABLE ORAL ONCE
Refills: 0 | Status: COMPLETED | OUTPATIENT
Start: 2025-03-03 | End: 2025-03-03

## 2025-03-03 RX ORDER — NALOXONE HYDROCHLORIDE 4 MG/.1ML
4 SPRAY NASAL
Qty: 1 | Refills: 0 | Status: ACTIVE | COMMUNITY
Start: 2025-03-03 | End: 1900-01-01

## 2025-03-03 RX ORDER — OXYCODONE HYDROCHLORIDE 30 MG/1
1 TABLET ORAL
Qty: 30 | Refills: 0
Start: 2025-03-03 | End: 2025-03-07

## 2025-03-03 RX ADMIN — GABAPENTIN 100 MILLIGRAM(S): 400 CAPSULE ORAL at 06:32

## 2025-03-03 RX ADMIN — FUROSEMIDE 20 MILLIGRAM(S): 10 INJECTION INTRAMUSCULAR; INTRAVENOUS at 13:00

## 2025-03-03 RX ADMIN — Medication 25 GRAM(S): at 06:38

## 2025-03-03 RX ADMIN — Medication 20 MILLIEQUIVALENT(S): at 13:00

## 2025-03-03 RX ADMIN — CLOPIDOGREL BISULFATE 75 MILLIGRAM(S): 75 TABLET, FILM COATED ORAL at 13:01

## 2025-03-03 RX ADMIN — Medication 1 APPLICATION(S): at 07:02

## 2025-03-03 RX ADMIN — Medication 500 MILLIGRAM(S): at 13:01

## 2025-03-03 RX ADMIN — HEPARIN SODIUM 5000 UNIT(S): 1000 INJECTION INTRAVENOUS; SUBCUTANEOUS at 06:32

## 2025-03-03 RX ADMIN — Medication 20 MILLIGRAM(S): at 06:32

## 2025-03-03 RX ADMIN — FOLIC ACID 1 MILLIGRAM(S): 1 TABLET ORAL at 13:00

## 2025-03-03 RX ADMIN — METOPROLOL SUCCINATE 25 MILLIGRAM(S): 50 TABLET, EXTENDED RELEASE ORAL at 13:01

## 2025-03-03 RX ADMIN — Medication 81 MILLIGRAM(S): at 13:00

## 2025-03-03 RX ADMIN — Medication 30 MILLILITER(S): at 13:52

## 2025-03-03 RX ADMIN — LIDOCAINE HYDROCHLORIDE 1 PATCH: 20 JELLY TOPICAL at 03:10

## 2025-03-03 RX ADMIN — METOPROLOL SUCCINATE 25 MILLIGRAM(S): 50 TABLET, EXTENDED RELEASE ORAL at 06:31

## 2025-03-03 RX ADMIN — AMIODARONE HYDROCHLORIDE 400 MILLIGRAM(S): 50 INJECTION, SOLUTION INTRAVENOUS at 06:31

## 2025-03-03 RX ADMIN — FUROSEMIDE 20 MILLIGRAM(S): 10 INJECTION INTRAMUSCULAR; INTRAVENOUS at 06:32

## 2025-03-03 NOTE — PROGRESS NOTE ADULT - PROVIDER SPECIALTY LIST ADULT
CT Surgery
Critical Care
CT Surgery
Critical Care

## 2025-03-03 NOTE — PROGRESS NOTE ADULT - SUBJECTIVE AND OBJECTIVE BOX
CTU Attending Progress Daily Note     01 Mar 2025 16:22  Procedure: CABG (C4L)  POD#: 12    He has history of Hypertension    Gallstones    History of abdominal aortic aneurysm (AAA)    Obesity    CAD (coronary artery disease)    History of hepatitis C    HPI:   59y Male PMH of HTN, HLD, nephrolithiasis, GERD, Cholecystitis (s/p lap abdulaziz), AAA without prior surgical intervention, obesity, former smoker (smoked for 40 years, quit 3 years ago), chronic back pain. Patient reported SOB when going up 2 flights of stairs or when walking at a fast pace. CCTA revealed CAC of 847 with multivessel disease, followed by CT Fractional flow Reserve (FFR-CT) confirming findings. Presented today for elective LHC which demonstrated Left main with multi-vessel involvement. On 02/19/25, he presented for elective CABG.     OR Data  Procedure: C4L  Bypass: 160  Cross Clamp: None   Pre LV Fxn: 50-55%      RV Fxn: nml  Post LV Fxn: 50-55%     RV Fxn: nml  Blood Products: pltsx2, cryox2  Cell Saver: 942 cc  Fluids: 2.5L  Pacing Wires: v wires    Hospital Course:  2/22-25 multiple episodes of afib. given amio, procainamide, metoprolol, digoxin  2/25 furosemide d/c due to cr bump  2/26 gentle diuresis  3/1: In and out of afib; restarted Amio (QTC 400s)    OBJECTIVE:  ICU Vital Signs Last 24 Hrs  T(C): 36.8 (03 Mar 2025 08:00), Max: 37.1 (03 Mar 2025 06:00)  T(F): 98.2 (03 Mar 2025 08:00), Max: 98.8 (03 Mar 2025 06:00)  HR: 88 (03 Mar 2025 12:00) (77 - 88)  BP: 107/65 (03 Mar 2025 12:00) (89/62 - 149/63)  BP(mean): 79 (03 Mar 2025 12:00) (71 - 90)  ABP: --  ABP(mean): --  RR: 22 (03 Mar 2025 12:00) (19 - 30)  SpO2: 94% (03 Mar 2025 12:00) (92% - 97%)    O2 Parameters below as of 03 Mar 2025 12:00  Patient On (Oxygen Delivery Method): room air          I&O's Summary    02 Mar 2025 07:01  -  03 Mar 2025 07:00  --------------------------------------------------------  IN: 914 mL / OUT: 2650 mL / NET: -1736 mL    03 Mar 2025 07:01  -  03 Mar 2025 12:33  --------------------------------------------------------  IN: 0 mL / OUT: 575 mL / NET: -575 mL      I&O's Detail    02 Mar 2025 07:01  -  03 Mar 2025 07:00  --------------------------------------------------------  IN:    Oral Fluid: 914 mL  Total IN: 914 mL    OUT:    Voided (mL): 2650 mL  Total OUT: 2650 mL    Total NET: -1736 mL      03 Mar 2025 07:01  -  03 Mar 2025 12:33  --------------------------------------------------------  IN:  Total IN: 0 mL    OUT:    Voided (mL): 575 mL  Total OUT: 575 mL    Total NET: -575 mL          CAPILLARY BLOOD GLUCOSE        LABS:                          8.4    9.86  )-----------( 422      ( 02 Mar 2025 04:00 )             25.8     03-03    134[L]  |  95[L]  |  22[H]  ----------------------------<  110[H]  4.5   |  32  |  1.3    Ca    8.0[L]      03 Mar 2025 04:10  Mg     1.9     03-03    TPro  6.1  /  Alb  3.3[L]  /  TBili  0.6  /  DBili  x   /  AST  75[H]  /  ALT  136[H]  /  AlkPhos  60  03-03      Urinalysis Basic - ( 03 Mar 2025 04:10 )    Color: x / Appearance: x / SG: x / pH: x  Gluc: 110 mg/dL / Ketone: x  / Bili: x / Urobili: x   Blood: x / Protein: x / Nitrite: x   Leuk Esterase: x / RBC: x / WBC x   Sq Epi: x / Non Sq Epi: x / Bacteria: x        Home Medications:  aspirin 81 mg oral tablet: orally once a day (19 Feb 2025 06:54)  clopidogrel 75 mg oral tablet: 1 tab(s) orally once a day (19 Feb 2025 06:54)  folic acid 1 mg oral tablet: 1 tab(s) orally once a day (03 Mar 2025 11:33)  MELOXICAM 15 MG TABLET:  (19 Feb 2025 06:54)  omeprazole 40 mg oral delayed release capsule: 1 cap(s) orally once a day (19 Feb 2025 06:54)    HOSPITAL MEDICATIONS:  MEDICATIONS  (STANDING):  aMIOdarone    Tablet 200 milliGRAM(s) Oral two times a day  ascorbic acid 500 milliGRAM(s) Oral daily  aspirin enteric coated 81 milliGRAM(s) Oral daily  clopidogrel Tablet 75 milliGRAM(s) Oral daily  famotidine    Tablet 20 milliGRAM(s) Oral every 12 hours  fluticasone propionate/ salmeterol 100-50 MICROgram(s) Diskus 1 Dose(s) Inhalation two times a day  folic acid 1 milliGRAM(s) Oral daily  furosemide    Tablet 20 milliGRAM(s) Oral two times a day  metoprolol tartrate 25 milliGRAM(s) Oral every 8 hours  potassium chloride    Tablet ER 20 milliEquivalent(s) Oral daily    MEDICATIONS  (PRN):  oxyCODONE    IR 5 milliGRAM(s) Oral every 4 hours PRN Moderate Pain (4 - 6)    RADIOLOGY:  Chest X-ray Reviewed    REVIEW OF SYSTEMS:  CONSTITUTIONAL: [X] all negative; [ ] weakness, [ ] fevers, [ ] chills  EYES/ENT: [X] all negative; [ ] visual changes, [ ] vertigo, [ ] throat pain   NECK: [X] all negative; [ ] pain, [ ] stiffness  RESPIRATORY: [] all negative, [ ] cough, [ ] wheezing, [ ] hemoptysis, [ ] shortness of breath  CARDIOVASCULAR: [] all negative; [ ] chest pain, [ ] palpitations, [ ] orthopnea  GASTROINTESTINAL: [X] all negative; [ ]abdominal pain, [ ] nausea, [ ] vomiting, [ ] hematemesis, [ ] diarrhea, [ ] constipation, [ ] melena, [ ] hematochezia.  GENITOURINARY: [X] all negative; [ ] dysuria, [ ] frequency, [ ] hematuria  NEUROLOGICAL: [X] all negative; [ ] numbness, [ ] weakness  SKIN: [X] all negative; [ ] itching, [ ] burning, [ ] rashes, [ ] lesions   All other review of systems is negative unless indicated above.  [  ] Unable to assess ROS because           Assessment   s/p CABG POD 12 currently progressing well with recovery and optimization for discharge.   in rsrs  on amio po    Plan:    Neuro:  Multimodal pain management  Tylenol, Lidoderm patch, gabapentin, opiates as needed  Monitor neuro status in the post op period    CV:  S/P CABG  Monitor perfusion, lactate, UOP,   Maintain MAP > 65  ASA, Plavix statin  Amio Restarted for AF with RVR  Lopressor 25 TID - increase as tolerated  Will need AC for Afib    Resp:  Room air, sats > 92%  Continuous pulse oximetry monitoring  IS / Chest PT  OOBTC and Ambulate  Nebulizers as needed    GI:  Heart healthy diet  Bowel Regimen     Renal  Creatinine acceptable  Monitor UOP, lytes, creatinine  Continue diuresis with IV Lasix  Keep K > 4, Mg > 2    Endo:  Tight glucose control per CTICU protocl  Not requiring Insulin    Heme:  Acute blood loss anemia post surgery  DVT prophylaxis with Heparin subQ    ID:  Monitor fever curve and WBC count    ambulate up in chair    Upon my evaluation, the above patient has a high probability of imminent or life threatening deterioration due to a high risk for Shock, Cardiogenic shock, arrythmia, acute blood loss, acute kidney injury and acute pulmonary insufficiency which required my direct attention, intervention, and personal management.  Total critical care time indicated in the note includes review of radiology results, ordering, interpreting and reviewing diagnostic studies / lab tests with immediate assessment and treatment, consultant collaboration on findings and treatment options, monitoring for potential decompensation, obtaining history from family, EMT, nursing home staff and/or treating physicians; directing the titration of pressors, oxygen support devices, fluid resuscitation, interpretation of cardiac output measurements, interpretation of radiological assessments, interpretation of EKG / rhythm strips, telemetry.  This time did not overlap with the management of other patients or performing separately reportable procedures.      Management of this patient was discussed with the CT Surgery/Thoracic surgery/Structural Cardiology attending and mid-level providers.    I acknowledge the use of copied documentation.  All copy forward documentation is my own and has been reviewed and revised as appropriate.  If no changes were made, it is because that information remains the same. CTU Attending Progress Daily Note     01 Mar 2025 16:22  Procedure: CABG (C4L)  POD#: 12    He has history of Hypertension    Gallstones    History of abdominal aortic aneurysm (AAA)    Obesity    CAD (coronary artery disease)    History of hepatitis C    HPI:   59y Male PMH of HTN, HLD, nephrolithiasis, GERD, Cholecystitis (s/p lap abdulaziz), AAA without prior surgical intervention, obesity, former smoker (smoked for 40 years, quit 3 years ago), chronic back pain. Patient reported SOB when going up 2 flights of stairs or when walking at a fast pace. CCTA revealed CAC of 847 with multivessel disease, followed by CT Fractional flow Reserve (FFR-CT) confirming findings. Presented today for elective LHC which demonstrated Left main with multi-vessel involvement. On 02/19/25, he presented for elective CABG.     OR Data  Procedure: C4L  Bypass: 160  Cross Clamp: None   Pre LV Fxn: 50-55%      RV Fxn: nml  Post LV Fxn: 50-55%     RV Fxn: nml  Blood Products: pltsx2, cryox2  Cell Saver: 942 cc  Fluids: 2.5L  Pacing Wires: v wires    Hospital Course:  2/22-25 multiple episodes of afib. given amio, procainamide, metoprolol, digoxin  2/25 furosemide d/c due to cr bump  2/26 gentle diuresis  3/1: In and out of afib; restarted Amio (QTC 400s)    OBJECTIVE:  ICU Vital Signs Last 24 Hrs  T(C): 36.8 (03 Mar 2025 08:00), Max: 37.1 (03 Mar 2025 06:00)  T(F): 98.2 (03 Mar 2025 08:00), Max: 98.8 (03 Mar 2025 06:00)  HR: 88 (03 Mar 2025 12:00) (77 - 88)  BP: 107/65 (03 Mar 2025 12:00) (89/62 - 149/63)  BP(mean): 79 (03 Mar 2025 12:00) (71 - 90)  ABP: --  ABP(mean): --  RR: 22 (03 Mar 2025 12:00) (19 - 30)  SpO2: 94% (03 Mar 2025 12:00) (92% - 97%)    O2 Parameters below as of 03 Mar 2025 12:00  Patient On (Oxygen Delivery Method): room air          I&O's Summary    02 Mar 2025 07:01  -  03 Mar 2025 07:00  --------------------------------------------------------  IN: 914 mL / OUT: 2650 mL / NET: -1736 mL    03 Mar 2025 07:01  -  03 Mar 2025 12:33  --------------------------------------------------------  IN: 0 mL / OUT: 575 mL / NET: -575 mL      I&O's Detail    02 Mar 2025 07:01  -  03 Mar 2025 07:00  --------------------------------------------------------  IN:    Oral Fluid: 914 mL  Total IN: 914 mL    OUT:    Voided (mL): 2650 mL  Total OUT: 2650 mL    Total NET: -1736 mL      03 Mar 2025 07:01  -  03 Mar 2025 12:33  --------------------------------------------------------  IN:  Total IN: 0 mL    OUT:    Voided (mL): 575 mL  Total OUT: 575 mL    Total NET: -575 mL          CAPILLARY BLOOD GLUCOSE        LABS:                          8.4    9.86  )-----------( 422      ( 02 Mar 2025 04:00 )             25.8     03-03    134[L]  |  95[L]  |  22[H]  ----------------------------<  110[H]  4.5   |  32  |  1.3    Ca    8.0[L]      03 Mar 2025 04:10  Mg     1.9     03-03    TPro  6.1  /  Alb  3.3[L]  /  TBili  0.6  /  DBili  x   /  AST  75[H]  /  ALT  136[H]  /  AlkPhos  60  03-03      Urinalysis Basic - ( 03 Mar 2025 04:10 )    Color: x / Appearance: x / SG: x / pH: x  Gluc: 110 mg/dL / Ketone: x  / Bili: x / Urobili: x   Blood: x / Protein: x / Nitrite: x   Leuk Esterase: x / RBC: x / WBC x   Sq Epi: x / Non Sq Epi: x / Bacteria: x        Home Medications:  aspirin 81 mg oral tablet: orally once a day (19 Feb 2025 06:54)  clopidogrel 75 mg oral tablet: 1 tab(s) orally once a day (19 Feb 2025 06:54)  folic acid 1 mg oral tablet: 1 tab(s) orally once a day (03 Mar 2025 11:33)  MELOXICAM 15 MG TABLET:  (19 Feb 2025 06:54)  omeprazole 40 mg oral delayed release capsule: 1 cap(s) orally once a day (19 Feb 2025 06:54)    HOSPITAL MEDICATIONS:  MEDICATIONS  (STANDING):  aMIOdarone    Tablet 200 milliGRAM(s) Oral two times a day  ascorbic acid 500 milliGRAM(s) Oral daily  aspirin enteric coated 81 milliGRAM(s) Oral daily  clopidogrel Tablet 75 milliGRAM(s) Oral daily  famotidine    Tablet 20 milliGRAM(s) Oral every 12 hours  fluticasone propionate/ salmeterol 100-50 MICROgram(s) Diskus 1 Dose(s) Inhalation two times a day  folic acid 1 milliGRAM(s) Oral daily  furosemide    Tablet 20 milliGRAM(s) Oral two times a day  metoprolol tartrate 25 milliGRAM(s) Oral every 8 hours  potassium chloride    Tablet ER 20 milliEquivalent(s) Oral daily    MEDICATIONS  (PRN):  oxyCODONE    IR 5 milliGRAM(s) Oral every 4 hours PRN Moderate Pain (4 - 6)    RADIOLOGY:  Chest X-ray Reviewed    REVIEW OF SYSTEMS:  CONSTITUTIONAL: [X] all negative; [ ] weakness, [ ] fevers, [ ] chills  EYES/ENT: [X] all negative; [ ] visual changes, [ ] vertigo, [ ] throat pain   NECK: [X] all negative; [ ] pain, [ ] stiffness  RESPIRATORY: [] all negative, [ ] cough, [ ] wheezing, [ ] hemoptysis, [ ] shortness of breath  CARDIOVASCULAR: [] all negative; [ ] chest pain, [ ] palpitations, [ ] orthopnea  GASTROINTESTINAL: [X] all negative; [ ]abdominal pain, [ ] nausea, [ ] vomiting, [ ] hematemesis, [ ] diarrhea, [ ] constipation, [ ] melena, [ ] hematochezia.  GENITOURINARY: [X] all negative; [ ] dysuria, [ ] frequency, [ ] hematuria  NEUROLOGICAL: [X] all negative; [ ] numbness, [ ] weakness  SKIN: [X] all negative; [ ] itching, [ ] burning, [ ] rashes, [ ] lesions   All other review of systems is negative unless indicated above.  [  ] Unable to assess ROS because           Assessment   s/p CABG POD 12 currently progressing well with recovery and optimization for discharge.   in rsrs  on amio po on discharge will change to 200 q12    Plan:    Neuro:  Multimodal pain management  Tylenol, Lidoderm patch, gabapentin, opiates as needed  Monitor neuro status in the post op period    CV:  S/P CABG  Monitor perfusion, lactate, UOP,   Maintain MAP > 65  ASA, Plavix statin  Amio Restarted for AF with RVR  Lopressor 25 TID - increase as tolerated  Will need AC for Afib    Resp:  Room air, sats > 92%  Continuous pulse oximetry monitoring  IS / Chest PT  OOBTC and Ambulate  Nebulizers as needed    GI:  Heart healthy diet  Bowel Regimen     Renal  Creatinine acceptable  Monitor UOP, lytes, creatinine  Continue diuresis with IV Lasix  Keep K > 4, Mg > 2    Endo:  Tight glucose control per CTICU protocl  Not requiring Insulin    Heme:  Acute blood loss anemia post surgery  DVT prophylaxis with Heparin subQ    ID:  Monitor fever curve and WBC count    ambulate up in chair    Upon my evaluation, the above patient has a high probability of imminent or life threatening deterioration due to a high risk for Shock, Cardiogenic shock, arrythmia, acute blood loss, acute kidney injury and acute pulmonary insufficiency which required my direct attention, intervention, and personal management.  Total critical care time indicated in the note includes review of radiology results, ordering, interpreting and reviewing diagnostic studies / lab tests with immediate assessment and treatment, consultant collaboration on findings and treatment options, monitoring for potential decompensation, obtaining history from family, EMT, nursing home staff and/or treating physicians; directing the titration of pressors, oxygen support devices, fluid resuscitation, interpretation of cardiac output measurements, interpretation of radiological assessments, interpretation of EKG / rhythm strips, telemetry.  This time did not overlap with the management of other patients or performing separately reportable procedures.      Management of this patient was discussed with the CT Surgery/Thoracic surgery/Structural Cardiology attending and mid-level providers.    I acknowledge the use of copied documentation.  All copy forward documentation is my own and has been reviewed and revised as appropriate.  If no changes were made, it is because that information remains the same.

## 2025-03-03 NOTE — PROGRESS NOTE ADULT - SUBJECTIVE AND OBJECTIVE BOX
OPERATIVE PROCEDURE(s):                POD #   12 CABG                    59yMale  SURGEON(s): YUDELKA Martin  SUBJECTIVE ASSESSMENT: ready to go home    Vital Signs Last 24 Hrs  T(F): 98.8 (03 Mar 2025 06:00), Max: 98.8 (03 Mar 2025 06:00)  HR: 79 (03 Mar 2025 07:00) (76 - 88)  BP: 114/71 (03 Mar 2025 07:00) (89/62 - 149/63)  BP(mean): 86 (03 Mar 2025 07:00) (71 - 90)  RR: 23 (03 Mar 2025 07:00) (21 - 30)  SpO2: 94% (03 Mar 2025 07:00) (92% - 98%)      I&O's Detail    02 Mar 2025 07:01  -  03 Mar 2025 07:00  --------------------------------------------------------  IN:    Oral Fluid: 914 mL  Total IN: 914 mL    OUT:    Voided (mL): 2650 mL  Total OUT: 2650 mL        Net:   I&O's Detail    01 Mar 2025 07:01  -  02 Mar 2025 07:00  --------------------------------------------------------  Total NET: -105 mL      02 Mar 2025 07:01  -  03 Mar 2025 07:00  --------------------------------------------------------  Total NET: -1736 mL        CAPILLARY BLOOD GLUCOSE        Physical Exam:  General: NAD; A&Ox3  Cardiac: S1/S2, RRR, no murmur, no rubs  Lungs: unlabored shallow respirations, bilateral bs decreased at bases  Abdomen: Soft/NT/protuberant  Sternum: Intact, no click, incision healing well with no drainage  Incisions: Incisions clean/dry/intact, + ecchymosis right thigh  Extremities: mild  edema b/l lower extremities    BOWEL MOVEMENT:  [] YES         LABS:                        8.4[L]  9.86  )-----------( 422[H]    ( 02 Mar 2025 04:00 )             25.8[L]                        8.3[L]  9.09  )-----------( 347      ( 01 Mar 2025 04:00 )             25.7[L]    03-03    134[L]  |  95[L]  |  22[H]  ----------------------------<  110[H]  4.5   |  32  |  1.3  03-02    133[L]  |  94[L]  |  24[H]  ----------------------------<  110[H]  3.9   |  30  |  1.3    Ca    8.0[L]      03 Mar 2025 04:10  Mg     1.9     03-03    TPro  6.1 [6.0 - 8.0]  /  Alb  3.3[L] [3.5 - 5.2]  /  TBili  0.6 [0.2 - 1.2]  /  DBili  x   /  AST  75[H] [0 - 41]  /  ALT  136[H] [0 - 41]  /  AlkPhos  60 [30 - 115]  03-03          RADIOLOGY & ADDITIONAL TESTS:  CXR:  EKG:  MEDICATIONS  (STANDING):  aMIOdarone    Tablet 400 milliGRAM(s) Oral two times a day  ascorbic acid 500 milliGRAM(s) Oral daily  aspirin enteric coated 81 milliGRAM(s) Oral daily  bisacodyl 5 milliGRAM(s) Oral every 12 hours  chlorhexidine 2% Cloths 1 Application(s) Topical daily  clopidogrel Tablet 75 milliGRAM(s) Oral daily  famotidine    Tablet 20 milliGRAM(s) Oral every 12 hours  fluticasone propionate/ salmeterol 100-50 MICROgram(s) Diskus 1 Dose(s) Inhalation two times a day  folic acid 1 milliGRAM(s) Oral daily  furosemide   Injectable 20 milliGRAM(s) IV Push two times a day  gabapentin 100 milliGRAM(s) Oral every 8 hours  heparin   Injectable 5000 Unit(s) SubCutaneous every 12 hours  lidocaine   4% Patch 1 Patch Transdermal daily  melatonin 5 milliGRAM(s) Oral at bedtime  metoclopramide Injectable 10 milliGRAM(s) IV Push once  metoprolol tartrate 25 milliGRAM(s) Oral every 8 hours  potassium chloride    Tablet ER 20 milliEquivalent(s) Oral daily    MEDICATIONS  (PRN):  aluminum hydroxide/magnesium hydroxide/simethicone Suspension 30 milliLiter(s) Oral every 4 hours PRN Dyspepsia  hydrALAZINE Injectable 10 milliGRAM(s) IV Push every 1 hour PRN SBP>150  ipratropium    for Nebulization 500 MICROGram(s) Nebulizer every 6 hours PRN Shortness of Breath  oxyCODONE    IR 5 milliGRAM(s) Oral every 4 hours PRN Moderate Pain (4 - 6)    Allergies    No Known Allergies    Intolerances      Ambulation/Activity Status: ambulate as tolerated    Assessment/Plan:  59y Male s/p CABG x4     POD #12  - Case and plan discussed with CTU Intensivist and CT Surgeon - Dr. Martin  - Continue CTU supportive care and ongoing plan of care as per continuing CTU rounds.   - Continue DVT/GI prophylaxis  - Incentive Spirometry 10 times an hour  - Continue to advance physical activity as tolerated and continue PT/OT as directed  1. CAD s/p CABG: Continue ASA, and BB. Pain control prn; start STATIN held again due to elevated lft's which are trending down  2. Anemia, 2/2 acute po blood loss: H/H has been stable, continue to monitor, ferrous sulfate and folic acid.   3. HTN- cont Norvasc and bb  4. Post-op A. Fib ppx: monitor electrolytes, converted to sr with amio and procan. LFTs downtrending and QTc 495, will continue Amiodarone 400 mg for one more day then convert to 200mg bid, No anticoagulation  5. COPD/Hypoxia: expected post op pulmonary insuffiencey resolved on room air, duoneb q6hr, advair. Keep O2sat >92%. Encourage incentive spirometry.   6. lasix for noncardiogenic fluid overload   7. Pre Diabetes - A1C 6.0 - carb consistent diet   8. PA and lateral pending      Social Service Disposition:  Anticipate home with possible O2 on Monday   OPERATIVE PROCEDURE(s):                POD #   12 CABG                    59yMale  SURGEON(s): YUDELKA Martin  SUBJECTIVE ASSESSMENT: ready to go home    Vital Signs Last 24 Hrs  T(F): 98.8 (03 Mar 2025 06:00), Max: 98.8 (03 Mar 2025 06:00)  HR: 79 (03 Mar 2025 07:00) (76 - 88)  BP: 114/71 (03 Mar 2025 07:00) (89/62 - 149/63)  BP(mean): 86 (03 Mar 2025 07:00) (71 - 90)  RR: 23 (03 Mar 2025 07:00) (21 - 30)  SpO2: 94% (03 Mar 2025 07:00) (92% - 98%)      I&O's Detail    02 Mar 2025 07:01  -  03 Mar 2025 07:00  --------------------------------------------------------  IN:    Oral Fluid: 914 mL  Total IN: 914 mL    OUT:    Voided (mL): 2650 mL  Total OUT: 2650 mL        Net:   I&O's Detail    01 Mar 2025 07:01  -  02 Mar 2025 07:00  --------------------------------------------------------  Total NET: -105 mL      02 Mar 2025 07:01  -  03 Mar 2025 07:00  --------------------------------------------------------  Total NET: -1736 mL        CAPILLARY BLOOD GLUCOSE        Physical Exam:  General: NAD; A&Ox3  Cardiac: S1/S2, RRR, no murmur, no rubs  Lungs: unlabored shallow respirations, bilateral bs decreased at bases  Abdomen: Soft/NT/protuberant  Sternum: Intact, no click, incision healing well with no drainage  Incisions: Incisions clean/dry/intact, + ecchymosis right thigh  Extremities: mild  edema b/l lower extremities    BOWEL MOVEMENT:  [] YES         LABS:                        8.4[L]  9.86  )-----------( 422[H]    ( 02 Mar 2025 04:00 )             25.8[L]                        8.3[L]  9.09  )-----------( 347      ( 01 Mar 2025 04:00 )             25.7[L]    03-03    134[L]  |  95[L]  |  22[H]  ----------------------------<  110[H]  4.5   |  32  |  1.3  03-02    133[L]  |  94[L]  |  24[H]  ----------------------------<  110[H]  3.9   |  30  |  1.3    Ca    8.0[L]      03 Mar 2025 04:10  Mg     1.9     03-03    TPro  6.1 [6.0 - 8.0]  /  Alb  3.3[L] [3.5 - 5.2]  /  TBili  0.6 [0.2 - 1.2]  /  DBili  x   /  AST  75[H] [0 - 41]  /  ALT  136[H] [0 - 41]  /  AlkPhos  60 [30 - 115]  03-03      RADIOLOGY & ADDITIONAL TESTS:  CXR:  < from: Xray Chest 2 Views PA/Lat (03.03.25 @ 07:52) >    Support devices: None.    Cardiac/mediastinum/hilum: Enlarged cardiac silhouette.    Lung parenchyma/Pleura: Small bilateral pleural effusions and bibasilar   opacities/atelectasis. No pneumothorax.    Skeleton/soft tissues: Stable.      < end of copied text >    EKG: NSR with   MEDICATIONS  (STANDING):  aMIOdarone    Tablet 400 milliGRAM(s) Oral two times a day  ascorbic acid 500 milliGRAM(s) Oral daily  aspirin enteric coated 81 milliGRAM(s) Oral daily  bisacodyl 5 milliGRAM(s) Oral every 12 hours  chlorhexidine 2% Cloths 1 Application(s) Topical daily  clopidogrel Tablet 75 milliGRAM(s) Oral daily  famotidine    Tablet 20 milliGRAM(s) Oral every 12 hours  fluticasone propionate/ salmeterol 100-50 MICROgram(s) Diskus 1 Dose(s) Inhalation two times a day  folic acid 1 milliGRAM(s) Oral daily  furosemide   Injectable 20 milliGRAM(s) IV Push two times a day  gabapentin 100 milliGRAM(s) Oral every 8 hours  heparin   Injectable 5000 Unit(s) SubCutaneous every 12 hours  lidocaine   4% Patch 1 Patch Transdermal daily  melatonin 5 milliGRAM(s) Oral at bedtime  metoclopramide Injectable 10 milliGRAM(s) IV Push once  metoprolol tartrate 25 milliGRAM(s) Oral every 8 hours  potassium chloride    Tablet ER 20 milliEquivalent(s) Oral daily    MEDICATIONS  (PRN):  aluminum hydroxide/magnesium hydroxide/simethicone Suspension 30 milliLiter(s) Oral every 4 hours PRN Dyspepsia  hydrALAZINE Injectable 10 milliGRAM(s) IV Push every 1 hour PRN SBP>150  ipratropium    for Nebulization 500 MICROGram(s) Nebulizer every 6 hours PRN Shortness of Breath  oxyCODONE    IR 5 milliGRAM(s) Oral every 4 hours PRN Moderate Pain (4 - 6)    Allergies    No Known Allergies    Intolerances      Ambulation/Activity Status: ambulate as tolerated    Assessment/Plan:  59y Male s/p CABG x4     POD #12  - Case and plan discussed with CTU Intensivist and CT Surgeon - Dr. Martin  - Continue CTU supportive care and ongoing plan of care as per continuing CTU rounds.   - Continue DVT/GI prophylaxis  - Incentive Spirometry 10 times an hour  - Continue to advance physical activity as tolerated and continue PT/OT as directed  1. CAD s/p CABG: Continue ASA, and BB. Pain control prn;  STATIN held due to elevated lft's which are trending down, will be reassessed as outpatient  2. Anemia, 2/2 acute po blood loss: H/H has been stable, continue to monitor, ferrous sulfate and folic acid.   3. HTN- cont Norvasc and bb  4. Post-op A. Fib ppx: monitor electrolytes, converted to sr with amio and procan. LFTs downtrending and QTc 470, will continue Amiodarone 200 mg bid, No anticoagulation has remained in SR for 48 hours  5. COPD/Hypoxia: expected post op pulmonary insuffiencey resolved on room air,r, advair. Keep O2sat >92%. Encourage incentive spirometry.   6. lasix for noncardiogenic fluid overload - continue lasix  7. Pre Diabetes - A1C 6.0 - carb consistent diet         Social Service Disposition:  Anticipate home today

## 2025-03-04 ENCOUNTER — APPOINTMENT (OUTPATIENT)
Dept: CARE COORDINATION | Facility: HOME HEALTH | Age: 60
End: 2025-03-04
Payer: MEDICARE

## 2025-03-04 ENCOUNTER — TRANSCRIPTION ENCOUNTER (OUTPATIENT)
Age: 60
End: 2025-03-04

## 2025-03-04 PROCEDURE — 99024 POSTOP FOLLOW-UP VISIT: CPT

## 2025-03-05 ENCOUNTER — NON-APPOINTMENT (OUTPATIENT)
Age: 60
End: 2025-03-05

## 2025-03-06 ENCOUNTER — APPOINTMENT (OUTPATIENT)
Dept: CARDIOTHORACIC SURGERY | Facility: CLINIC | Age: 60
End: 2025-03-06
Payer: MEDICARE

## 2025-03-06 VITALS
WEIGHT: 261 LBS | SYSTOLIC BLOOD PRESSURE: 133 MMHG | OXYGEN SATURATION: 94 % | TEMPERATURE: 97.9 F | HEIGHT: 72 IN | DIASTOLIC BLOOD PRESSURE: 84 MMHG | RESPIRATION RATE: 14 BRPM | BODY MASS INDEX: 35.35 KG/M2 | HEART RATE: 79 BPM

## 2025-03-06 VITALS
SYSTOLIC BLOOD PRESSURE: 112 MMHG | HEART RATE: 74 BPM | OXYGEN SATURATION: 95 % | RESPIRATION RATE: 12 BRPM | DIASTOLIC BLOOD PRESSURE: 72 MMHG

## 2025-03-06 DIAGNOSIS — D69.6 THROMBOCYTOPENIA, UNSPECIFIED: ICD-10-CM

## 2025-03-06 DIAGNOSIS — D62 ACUTE POSTHEMORRHAGIC ANEMIA: ICD-10-CM

## 2025-03-06 DIAGNOSIS — Z86.19 PERSONAL HISTORY OF OTHER INFECTIOUS AND PARASITIC DISEASES: ICD-10-CM

## 2025-03-06 DIAGNOSIS — E87.70 FLUID OVERLOAD, UNSPECIFIED: ICD-10-CM

## 2025-03-06 DIAGNOSIS — I10 ESSENTIAL (PRIMARY) HYPERTENSION: ICD-10-CM

## 2025-03-06 DIAGNOSIS — G89.29 OTHER CHRONIC PAIN: ICD-10-CM

## 2025-03-06 DIAGNOSIS — J81.0 ACUTE PULMONARY EDEMA: ICD-10-CM

## 2025-03-06 DIAGNOSIS — G89.12 ACUTE POST-THORACOTOMY PAIN: ICD-10-CM

## 2025-03-06 DIAGNOSIS — M54.9 DORSALGIA, UNSPECIFIED: ICD-10-CM

## 2025-03-06 DIAGNOSIS — E83.42 HYPOMAGNESEMIA: ICD-10-CM

## 2025-03-06 DIAGNOSIS — J98.11 ATELECTASIS: ICD-10-CM

## 2025-03-06 DIAGNOSIS — Z79.02 LONG TERM (CURRENT) USE OF ANTITHROMBOTICS/ANTIPLATELETS: ICD-10-CM

## 2025-03-06 DIAGNOSIS — R73.03 PREDIABETES: ICD-10-CM
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
## 2025-03-06 DIAGNOSIS — I25.119 ATHEROSCLEROTIC HEART DISEASE OF NATIVE CORONARY ARTERY WITH UNSPECIFIED ANGINA PECTORIS: ICD-10-CM

## 2025-03-06 DIAGNOSIS — E78.5 HYPERLIPIDEMIA, UNSPECIFIED: ICD-10-CM
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
## 2025-03-06 DIAGNOSIS — K21.9 GASTRO-ESOPHAGEAL REFLUX DISEASE WITHOUT ESOPHAGITIS: ICD-10-CM

## 2025-03-06 DIAGNOSIS — Z79.82 LONG TERM (CURRENT) USE OF ASPIRIN: ICD-10-CM

## 2025-03-06 DIAGNOSIS — E66.9 OBESITY, UNSPECIFIED: ICD-10-CM

## 2025-03-06 DIAGNOSIS — R74.01 ELEVATION OF LEVELS OF LIVER TRANSAMINASE LEVELS: ICD-10-CM

## 2025-03-06 DIAGNOSIS — Z87.891 PERSONAL HISTORY OF NICOTINE DEPENDENCE: ICD-10-CM

## 2025-03-06 DIAGNOSIS — R73.09 OTHER ABNORMAL GLUCOSE: ICD-10-CM

## 2025-03-06 DIAGNOSIS — J95.1 ACUTE PULMONARY INSUFFICIENCY FOLLOWING THORACIC SURGERY: ICD-10-CM

## 2025-03-06 DIAGNOSIS — I48.0 PAROXYSMAL ATRIAL FIBRILLATION: ICD-10-CM

## 2025-03-06 PROBLEM — Z95.1 S/P CABG (CORONARY ARTERY BYPASS GRAFT): Status: ACTIVE | Noted: 2025-03-03

## 2025-03-06 PROCEDURE — 99024 POSTOP FOLLOW-UP VISIT: CPT

## 2025-03-06 RX ORDER — POTASSIUM CHLORIDE 1500 MG/1
20 TABLET, EXTENDED RELEASE ORAL
Refills: 0 | Status: ACTIVE | COMMUNITY
Start: 2025-03-06

## 2025-03-06 RX ORDER — PANTOPRAZOLE 40 MG/1
40 TABLET, DELAYED RELEASE ORAL
Qty: 30 | Refills: 3 | Status: ACTIVE | COMMUNITY
Start: 2025-03-05

## 2025-03-06 RX ORDER — AMIODARONE HYDROCHLORIDE 200 MG/1
200 TABLET ORAL
Refills: 0 | Status: ACTIVE | COMMUNITY
Start: 2025-03-06

## 2025-03-06 RX ORDER — FUROSEMIDE 20 MG/1
20 TABLET ORAL TWICE DAILY
Qty: 14 | Refills: 0 | Status: ACTIVE | COMMUNITY
Start: 2025-03-04

## 2025-03-06 RX ORDER — FOLIC ACID 1 MG/1
1 TABLET ORAL
Refills: 0 | Status: ACTIVE | COMMUNITY
Start: 2025-03-06

## 2025-03-06 RX ORDER — FLUTICASONE PROPIONATE AND SALMETEROL 250; 50 UG/1; UG/1
250-50 POWDER RESPIRATORY (INHALATION)
Refills: 0 | Status: ACTIVE | COMMUNITY
Start: 2025-03-06

## 2025-03-13 ENCOUNTER — APPOINTMENT (OUTPATIENT)
Dept: CARDIOTHORACIC SURGERY | Facility: CLINIC | Age: 60
End: 2025-03-13

## 2025-03-13 VITALS
RESPIRATION RATE: 16 BRPM | WEIGHT: 255 LBS | HEART RATE: 76 BPM | DIASTOLIC BLOOD PRESSURE: 90 MMHG | TEMPERATURE: 97.4 F | OXYGEN SATURATION: 95 % | SYSTOLIC BLOOD PRESSURE: 125 MMHG | HEIGHT: 72 IN | BODY MASS INDEX: 34.54 KG/M2

## 2025-03-13 RX ORDER — LIDOCAINE 5% 700 MG/1
5 PATCH TOPICAL
Qty: 7 | Refills: 0 | Status: ACTIVE | COMMUNITY
Start: 2025-03-13 | End: 1900-01-01

## 2025-03-14 ENCOUNTER — OUTPATIENT (OUTPATIENT)
Dept: OUTPATIENT SERVICES | Facility: HOSPITAL | Age: 60
LOS: 1 days | End: 2025-03-14
Payer: MEDICARE

## 2025-03-14 DIAGNOSIS — Z98.890 OTHER SPECIFIED POSTPROCEDURAL STATES: Chronic | ICD-10-CM

## 2025-03-14 DIAGNOSIS — Z90.49 ACQUIRED ABSENCE OF OTHER SPECIFIED PARTS OF DIGESTIVE TRACT: Chronic | ICD-10-CM

## 2025-03-14 DIAGNOSIS — Z90.89 ACQUIRED ABSENCE OF OTHER ORGANS: Chronic | ICD-10-CM

## 2025-03-14 DIAGNOSIS — R06.02 SHORTNESS OF BREATH: ICD-10-CM

## 2025-03-14 PROCEDURE — 71046 X-RAY EXAM CHEST 2 VIEWS: CPT | Mod: 26

## 2025-03-14 PROCEDURE — 71046 X-RAY EXAM CHEST 2 VIEWS: CPT

## 2025-03-15 DIAGNOSIS — R06.02 SHORTNESS OF BREATH: ICD-10-CM

## 2025-03-18 ENCOUNTER — APPOINTMENT (OUTPATIENT)
Dept: CARDIOTHORACIC SURGERY | Facility: CLINIC | Age: 60
End: 2025-03-18
Payer: MEDICARE

## 2025-03-18 VITALS
WEIGHT: 250 LBS | BODY MASS INDEX: 33.86 KG/M2 | DIASTOLIC BLOOD PRESSURE: 86 MMHG | HEIGHT: 72 IN | SYSTOLIC BLOOD PRESSURE: 124 MMHG

## 2025-03-18 VITALS
RESPIRATION RATE: 16 BRPM | HEART RATE: 63 BPM | TEMPERATURE: 97.7 F | BODY MASS INDEX: 33.86 KG/M2 | SYSTOLIC BLOOD PRESSURE: 138 MMHG | HEIGHT: 72 IN | WEIGHT: 250 LBS | DIASTOLIC BLOOD PRESSURE: 91 MMHG | OXYGEN SATURATION: 95 %

## 2025-03-18 DIAGNOSIS — I10 ESSENTIAL (PRIMARY) HYPERTENSION: ICD-10-CM

## 2025-03-18 DIAGNOSIS — I25.10 ATHEROSCLEROTIC HEART DISEASE OF NATIVE CORONARY ARTERY W/OUT ANGINA PECTORIS: ICD-10-CM

## 2025-03-18 LAB
ALBUMIN SERPL ELPH-MCNC: 3.8 G/DL
ALP BLD-CCNC: 84 U/L
ALT SERPL-CCNC: 25 U/L
ANION GAP SERPL CALC-SCNC: 9 MMOL/L
AST SERPL-CCNC: 21 U/L
BILIRUB SERPL-MCNC: 0.3 MG/DL
BUN SERPL-MCNC: 17 MG/DL
CALCIUM SERPL-MCNC: 9.3 MG/DL
CHLORIDE SERPL-SCNC: 102 MMOL/L
CO2 SERPL-SCNC: 29 MMOL/L
CREAT SERPL-MCNC: 1.4 MG/DL
EGFRCR SERPLBLD CKD-EPI 2021: 57 ML/MIN/1.73M2
GLUCOSE SERPL-MCNC: 100 MG/DL
HCT VFR BLD CALC: 40.2 %
HGB BLD-MCNC: 12.5 G/DL
MCHC RBC-ENTMCNC: 29.2 PG
MCHC RBC-ENTMCNC: 31.1 G/DL
MCV RBC AUTO: 93.9 FL
NT-PROBNP SERPL-MCNC: 503 PG/ML
PLATELET # BLD AUTO: 324 K/UL
PMV BLD AUTO: 0 /100 WBCS
PMV BLD: 9.4 FL
POTASSIUM SERPL-SCNC: 4.7 MMOL/L
PROT SERPL-MCNC: 7.9 G/DL
RBC # BLD: 4.28 M/UL
RBC # FLD: 14.6 %
SODIUM SERPL-SCNC: 140 MMOL/L
WBC # FLD AUTO: 6.1 K/UL

## 2025-03-18 PROCEDURE — 99024 POSTOP FOLLOW-UP VISIT: CPT

## 2025-03-24 ENCOUNTER — APPOINTMENT (OUTPATIENT)
Dept: CARDIOLOGY | Facility: CLINIC | Age: 60
End: 2025-03-24
Payer: MEDICARE

## 2025-03-24 VITALS
HEART RATE: 71 BPM | DIASTOLIC BLOOD PRESSURE: 78 MMHG | BODY MASS INDEX: 34.13 KG/M2 | WEIGHT: 252 LBS | HEIGHT: 72 IN | SYSTOLIC BLOOD PRESSURE: 112 MMHG

## 2025-03-24 PROCEDURE — ZZZZZ: CPT

## 2025-03-24 PROCEDURE — 99214 OFFICE O/P EST MOD 30 MIN: CPT

## 2025-03-25 ENCOUNTER — APPOINTMENT (OUTPATIENT)
Dept: PAIN MANAGEMENT | Facility: CLINIC | Age: 60
End: 2025-03-25
Payer: MEDICARE

## 2025-03-25 ENCOUNTER — RESULT REVIEW (OUTPATIENT)
Age: 60
End: 2025-03-25

## 2025-03-25 ENCOUNTER — OUTPATIENT (OUTPATIENT)
Dept: OUTPATIENT SERVICES | Facility: HOSPITAL | Age: 60
LOS: 1 days | End: 2025-03-25
Payer: MEDICARE

## 2025-03-25 ENCOUNTER — APPOINTMENT (OUTPATIENT)
Dept: CARDIOTHORACIC SURGERY | Facility: CLINIC | Age: 60
End: 2025-03-25
Payer: MEDICARE

## 2025-03-25 VITALS
OXYGEN SATURATION: 94 % | RESPIRATION RATE: 16 BRPM | HEIGHT: 72 IN | BODY MASS INDEX: 33.86 KG/M2 | TEMPERATURE: 97.7 F | HEART RATE: 72 BPM | WEIGHT: 250 LBS | DIASTOLIC BLOOD PRESSURE: 87 MMHG | SYSTOLIC BLOOD PRESSURE: 130 MMHG

## 2025-03-25 DIAGNOSIS — Z90.49 ACQUIRED ABSENCE OF OTHER SPECIFIED PARTS OF DIGESTIVE TRACT: Chronic | ICD-10-CM

## 2025-03-25 DIAGNOSIS — M54.9 DORSALGIA, UNSPECIFIED: ICD-10-CM

## 2025-03-25 DIAGNOSIS — R07.9 CHEST PAIN, UNSPECIFIED: ICD-10-CM

## 2025-03-25 DIAGNOSIS — Z95.1 PRESENCE OF AORTOCORONARY BYPASS GRAFT: ICD-10-CM

## 2025-03-25 DIAGNOSIS — Z98.890 OTHER SPECIFIED POSTPROCEDURAL STATES: Chronic | ICD-10-CM

## 2025-03-25 PROCEDURE — 99024 POSTOP FOLLOW-UP VISIT: CPT

## 2025-03-25 PROCEDURE — 71046 X-RAY EXAM CHEST 2 VIEWS: CPT | Mod: 26

## 2025-03-25 PROCEDURE — 71046 X-RAY EXAM CHEST 2 VIEWS: CPT

## 2025-03-25 PROCEDURE — 99202 OFFICE O/P NEW SF 15 MIN: CPT

## 2025-03-25 RX ORDER — METHOCARBAMOL 750 MG/1
750 TABLET, FILM COATED ORAL 3 TIMES DAILY
Qty: 30 | Refills: 0 | Status: ACTIVE | COMMUNITY
Start: 2025-03-18 | End: 1900-01-01

## 2025-03-26 DIAGNOSIS — R07.9 CHEST PAIN, UNSPECIFIED: ICD-10-CM

## 2025-03-27 NOTE — PRE-OP CHECKLIST - BLOOD AVAILABLE
PREOPERATIVE DIAGNOSES: Nasal obstruction, inferior turbinate hypertrophy, tonsillar hypertrophy, recurrent acute tonsillitis, chronic tonsillitis, sleep disordered breathing.      POSTOPERATIVE DIAGNOSES: Same.     PROCEDURE PERFORMED:   Bilateral tonsillectomy  Bilateral submucosal resection of the inferior turbinates     SURGEON: Jason Wiggins MD      ASSISTANTS: None.     BLOOD LOSS: 20 mL.     COMPLICATIONS: None.      SPECIMENS: Bilateral palatine tonsils.     ANESTHESIA: General.     GRAFTS, IMPLANTS, DRAINS: None.     INDICATIONS: Improve symptoms.      FINDINGS:   Slight right anterior septal deviation.  Bilateral severe/marked inferior turbinate hypertrophy.  Bilateral 2+ cryptic, endophytic palatine tonsils.  Normal soft palate without bifid uvula.      OPERATIVE TECHNIQUE: The patient was brought to the operating room and identified by name and clinic number. They were placed supinely on the operating room table and general endotracheal anesthesia was induced by the anesthesia service. The patient was prepped and draped in standard fashion. Examination of the nose revealed a slight rightward anterior nasal septal deviation and severe/marked inferior turbinate hypertrophy. Afrin-soaked pledgets were placed in the nasal cavities bilaterally. The nose was injected with 1% lidocaine 1:100,000 epinephrine. After standard surgical pause, attention was turned to the inferior turbinates. The bilateral nasal cavities were examined using the 0 degree 4 mm endoscope. The turbinates were injected with 1% lidocaine with 1:100,000 epinephrine. Attention was turned the right. A 5 mm stab incision was made in the head of the inferior turbinate. Using the 2.9 mm turbinate blade, submucous resection of the inferior turbinate was performed by dissecting the mucosa off the bone and removing the submucosa with a shaver. The inferior turbinate was then outfractured with a [place] and the [place] elevator. Attention was turned to  the left. A 5 mm stab incision was made in the head of the inferior turbinate. Using the 2.9 mm turbinate blade, submucous resection of the inferior turbinate was performed by dissecting the mucosa off the bone and removing the submucosa with a shaver. The inferior turbinate was then outfractured with a [place] and the [place] elevator. The stab incisions were left to heal by secondary intention. The bilateral nasal cavities were irrigated and suctioned.  Surgicel was placed in the stab incisions.  Hemostasis was assured.    Next, the patient was suspended using a [place] mouthgag in the Catie position. Care was taken not to injure the teeth, tongue, lips, gums with insertion. Examination of the soft palate did not reveal evidence of bifid uvula or submucosal clefting. An oxygen pause was performed to verify inspired oxygen was less than 30%.      The left tonsil was grasped with an [place] clamp and retracted medially. Using the electrocautery on 20 W fulgurate, the tonsil was dissected free from the tonsil fossa in the pericapsular plane.  Points of bleeding were addressed with suction cautery. The right tonsil was then grasped with an [place] clamp and retracted medially. Using electrocautery on 20 W fulgurate, tonsils dissected free from the tonsil fossa on the pericapsular plane. Points bleeding were addressed with suction cautery. The tonsils were sent for pathologic analysis. A total of 12 mL of 0.25% bupivacaine plain was injected into the bilateral tonsil fossa.         Once hemostasis was assured, the nose and oral cavity were irrigated and suctioned. All hardware was removed from the mouth. The stomach was suctioned.     This marked the end of the procedure. The patient was then turned over to anesthesia for recovery where they were awakened, extubated, and transferred to the PACU in excellent condition. There were no complications. There was 20 mL of blood loss. All standard operating protocol universal  precautions were used throughout the procedure.     Jason Wiggins MD  Department of Otolaryngology-Head and Neck Surgery  [facility]    yes

## 2025-03-31 RX ORDER — METOPROLOL TARTRATE 25 MG/1
25 TABLET ORAL 3 TIMES DAILY
Qty: 90 | Refills: 0 | Status: ACTIVE | COMMUNITY
Start: 2025-03-31 | End: 1900-01-01

## 2025-03-31 RX ORDER — AMIODARONE HYDROCHLORIDE 200 MG/1
200 TABLET ORAL DAILY
Qty: 30 | Refills: 0 | Status: ACTIVE | COMMUNITY
Start: 2025-03-31 | End: 1900-01-01

## 2025-04-06 ENCOUNTER — INPATIENT (INPATIENT)
Facility: HOSPITAL | Age: 60
LOS: 0 days | Discharge: HOME HEALTH | DRG: 204 | End: 2025-04-07
Attending: STUDENT IN AN ORGANIZED HEALTH CARE EDUCATION/TRAINING PROGRAM | Admitting: INTERNAL MEDICINE
Payer: MEDICARE

## 2025-04-06 VITALS
HEART RATE: 56 BPM | HEIGHT: 73 IN | OXYGEN SATURATION: 99 % | TEMPERATURE: 98 F | SYSTOLIC BLOOD PRESSURE: 170 MMHG | DIASTOLIC BLOOD PRESSURE: 114 MMHG | RESPIRATION RATE: 20 BRPM | WEIGHT: 255.3 LBS

## 2025-04-06 DIAGNOSIS — Z98.890 OTHER SPECIFIED POSTPROCEDURAL STATES: Chronic | ICD-10-CM

## 2025-04-06 DIAGNOSIS — Z90.49 ACQUIRED ABSENCE OF OTHER SPECIFIED PARTS OF DIGESTIVE TRACT: Chronic | ICD-10-CM

## 2025-04-06 DIAGNOSIS — Z90.89 ACQUIRED ABSENCE OF OTHER ORGANS: Chronic | ICD-10-CM

## 2025-04-06 DIAGNOSIS — R06.09 OTHER FORMS OF DYSPNEA: ICD-10-CM

## 2025-04-06 LAB
ALBUMIN SERPL ELPH-MCNC: 4 G/DL — SIGNIFICANT CHANGE UP (ref 3.5–5.2)
ALP SERPL-CCNC: 72 U/L — SIGNIFICANT CHANGE UP (ref 30–115)
ALT FLD-CCNC: 16 U/L — SIGNIFICANT CHANGE UP (ref 0–41)
ANION GAP SERPL CALC-SCNC: 8 MMOL/L — SIGNIFICANT CHANGE UP (ref 7–14)
APTT BLD: 32.1 SEC — SIGNIFICANT CHANGE UP (ref 27–39.2)
AST SERPL-CCNC: 21 U/L — SIGNIFICANT CHANGE UP (ref 0–41)
BASE EXCESS BLDV CALC-SCNC: 3.7 MMOL/L — HIGH (ref -2–3)
BASOPHILS # BLD AUTO: 0.05 K/UL — SIGNIFICANT CHANGE UP (ref 0–0.2)
BASOPHILS NFR BLD AUTO: 0.9 % — SIGNIFICANT CHANGE UP (ref 0–1)
BILIRUB SERPL-MCNC: 0.3 MG/DL — SIGNIFICANT CHANGE UP (ref 0.2–1.2)
BUN SERPL-MCNC: 11 MG/DL — SIGNIFICANT CHANGE UP (ref 10–20)
CA-I SERPL-SCNC: 1.27 MMOL/L — SIGNIFICANT CHANGE UP (ref 1.15–1.33)
CALCIUM SERPL-MCNC: 9.2 MG/DL — SIGNIFICANT CHANGE UP (ref 8.4–10.5)
CHLORIDE SERPL-SCNC: 106 MMOL/L — SIGNIFICANT CHANGE UP (ref 98–110)
CO2 SERPL-SCNC: 26 MMOL/L — SIGNIFICANT CHANGE UP (ref 17–32)
CREAT SERPL-MCNC: 1 MG/DL — SIGNIFICANT CHANGE UP (ref 0.7–1.5)
EGFR: 86 ML/MIN/1.73M2 — SIGNIFICANT CHANGE UP
EGFR: 86 ML/MIN/1.73M2 — SIGNIFICANT CHANGE UP
EOSINOPHIL # BLD AUTO: 0.4 K/UL — SIGNIFICANT CHANGE UP (ref 0–0.7)
EOSINOPHIL NFR BLD AUTO: 6.9 % — SIGNIFICANT CHANGE UP (ref 0–8)
GAS PNL BLDV: 137 MMOL/L — SIGNIFICANT CHANGE UP (ref 136–145)
GAS PNL BLDV: SIGNIFICANT CHANGE UP
GAS PNL BLDV: SIGNIFICANT CHANGE UP
GLUCOSE SERPL-MCNC: 104 MG/DL — HIGH (ref 70–99)
HCO3 BLDV-SCNC: 31 MMOL/L — HIGH (ref 22–29)
HCT VFR BLD CALC: 41.8 % — LOW (ref 42–52)
HCT VFR BLDA CALC: 44 % — SIGNIFICANT CHANGE UP (ref 39–51)
HGB BLD CALC-MCNC: 14.6 G/DL — SIGNIFICANT CHANGE UP (ref 12.6–17.4)
HGB BLD-MCNC: 13.6 G/DL — LOW (ref 14–18)
IMM GRANULOCYTES NFR BLD AUTO: 0.2 % — SIGNIFICANT CHANGE UP (ref 0.1–0.3)
INR BLD: 1.02 RATIO — SIGNIFICANT CHANGE UP (ref 0.65–1.3)
LACTATE BLDV-MCNC: 1.3 MMOL/L — SIGNIFICANT CHANGE UP (ref 0.5–2)
LYMPHOCYTES # BLD AUTO: 1.76 K/UL — SIGNIFICANT CHANGE UP (ref 1.2–3.4)
LYMPHOCYTES # BLD AUTO: 30.4 % — SIGNIFICANT CHANGE UP (ref 20.5–51.1)
MCHC RBC-ENTMCNC: 29.3 PG — SIGNIFICANT CHANGE UP (ref 27–31)
MCHC RBC-ENTMCNC: 32.5 G/DL — SIGNIFICANT CHANGE UP (ref 32–37)
MCV RBC AUTO: 90.1 FL — SIGNIFICANT CHANGE UP (ref 80–94)
MONOCYTES # BLD AUTO: 0.61 K/UL — HIGH (ref 0.1–0.6)
MONOCYTES NFR BLD AUTO: 10.6 % — HIGH (ref 1.7–9.3)
NEUTROPHILS # BLD AUTO: 2.95 K/UL — SIGNIFICANT CHANGE UP (ref 1.4–6.5)
NEUTROPHILS NFR BLD AUTO: 51 % — SIGNIFICANT CHANGE UP (ref 42.2–75.2)
NRBC BLD AUTO-RTO: 0 /100 WBCS — SIGNIFICANT CHANGE UP (ref 0–0)
NT-PROBNP SERPL-SCNC: 665 PG/ML — HIGH (ref 0–300)
PCO2 BLDV: 56 MMHG — HIGH (ref 42–55)
PH BLDV: 7.35 — SIGNIFICANT CHANGE UP (ref 7.32–7.43)
PLATELET # BLD AUTO: 218 K/UL — SIGNIFICANT CHANGE UP (ref 130–400)
PMV BLD: 9.2 FL — SIGNIFICANT CHANGE UP (ref 7.4–10.4)
PO2 BLDV: 14 MMHG — LOW (ref 25–45)
POTASSIUM BLDV-SCNC: 4.6 MMOL/L — SIGNIFICANT CHANGE UP (ref 3.5–5.1)
POTASSIUM SERPL-MCNC: 4.5 MMOL/L — SIGNIFICANT CHANGE UP (ref 3.5–5)
POTASSIUM SERPL-SCNC: 4.5 MMOL/L — SIGNIFICANT CHANGE UP (ref 3.5–5)
PROT SERPL-MCNC: 7.5 G/DL — SIGNIFICANT CHANGE UP (ref 6–8)
PROTHROM AB SERPL-ACNC: 12.1 SEC — SIGNIFICANT CHANGE UP (ref 9.95–12.87)
RBC # BLD: 4.64 M/UL — LOW (ref 4.7–6.1)
RBC # FLD: 13.4 % — SIGNIFICANT CHANGE UP (ref 11.5–14.5)
SAO2 % BLDV: 13.4 % — LOW (ref 67–88)
SODIUM SERPL-SCNC: 140 MMOL/L — SIGNIFICANT CHANGE UP (ref 135–146)
TROPONIN T, HIGH SENSITIVITY RESULT: 18 NG/L — SIGNIFICANT CHANGE UP (ref 6–21)
TROPONIN T, HIGH SENSITIVITY RESULT: 20 NG/L — SIGNIFICANT CHANGE UP (ref 6–21)
WBC # BLD: 5.78 K/UL — SIGNIFICANT CHANGE UP (ref 4.8–10.8)
WBC # FLD AUTO: 5.78 K/UL — SIGNIFICANT CHANGE UP (ref 4.8–10.8)

## 2025-04-06 PROCEDURE — 71045 X-RAY EXAM CHEST 1 VIEW: CPT | Mod: 26

## 2025-04-06 PROCEDURE — 99285 EMERGENCY DEPT VISIT HI MDM: CPT

## 2025-04-06 PROCEDURE — 83036 HEMOGLOBIN GLYCOSYLATED A1C: CPT

## 2025-04-06 PROCEDURE — 80061 LIPID PANEL: CPT

## 2025-04-06 PROCEDURE — 83735 ASSAY OF MAGNESIUM: CPT

## 2025-04-06 PROCEDURE — 80048 BASIC METABOLIC PNL TOTAL CA: CPT

## 2025-04-06 PROCEDURE — 84484 ASSAY OF TROPONIN QUANT: CPT

## 2025-04-06 PROCEDURE — 84443 ASSAY THYROID STIM HORMONE: CPT

## 2025-04-06 PROCEDURE — 71275 CT ANGIOGRAPHY CHEST: CPT | Mod: 26

## 2025-04-06 PROCEDURE — 85027 COMPLETE CBC AUTOMATED: CPT

## 2025-04-06 PROCEDURE — 36415 COLL VENOUS BLD VENIPUNCTURE: CPT

## 2025-04-06 PROCEDURE — 84439 ASSAY OF FREE THYROXINE: CPT

## 2025-04-06 PROCEDURE — 93307 TTE W/O DOPPLER COMPLETE: CPT

## 2025-04-06 PROCEDURE — 93005 ELECTROCARDIOGRAM TRACING: CPT

## 2025-04-06 RX ORDER — METHOCARBAMOL 500 MG/1
750 TABLET, FILM COATED ORAL EVERY 8 HOURS
Refills: 0 | Status: DISCONTINUED | OUTPATIENT
Start: 2025-04-06 | End: 2025-04-07

## 2025-04-06 RX ORDER — HEPARIN SODIUM 1000 [USP'U]/ML
5000 INJECTION INTRAVENOUS; SUBCUTANEOUS EVERY 8 HOURS
Refills: 0 | Status: DISCONTINUED | OUTPATIENT
Start: 2025-04-06 | End: 2025-04-07

## 2025-04-06 RX ORDER — ASPIRIN 325 MG
81 TABLET ORAL DAILY
Refills: 0 | Status: DISCONTINUED | OUTPATIENT
Start: 2025-04-06 | End: 2025-04-07

## 2025-04-06 RX ORDER — METOPROLOL SUCCINATE 50 MG/1
25 TABLET, EXTENDED RELEASE ORAL EVERY 8 HOURS
Refills: 0 | Status: DISCONTINUED | OUTPATIENT
Start: 2025-04-06 | End: 2025-04-07

## 2025-04-06 RX ORDER — METHOCARBAMOL 500 MG/1
1 TABLET, FILM COATED ORAL
Refills: 0 | DISCHARGE

## 2025-04-06 RX ORDER — FOLIC ACID 1 MG/1
1 TABLET ORAL DAILY
Refills: 0 | Status: DISCONTINUED | OUTPATIENT
Start: 2025-04-06 | End: 2025-04-07

## 2025-04-06 RX ORDER — AMIODARONE HYDROCHLORIDE 50 MG/ML
200 INJECTION, SOLUTION INTRAVENOUS DAILY
Refills: 0 | Status: DISCONTINUED | OUTPATIENT
Start: 2025-04-06 | End: 2025-04-07

## 2025-04-06 RX ORDER — CLOPIDOGREL BISULFATE 75 MG/1
75 TABLET, FILM COATED ORAL DAILY
Refills: 0 | Status: DISCONTINUED | OUTPATIENT
Start: 2025-04-06 | End: 2025-04-07

## 2025-04-06 RX ADMIN — METHOCARBAMOL 750 MILLIGRAM(S): 500 TABLET, FILM COATED ORAL at 21:53

## 2025-04-06 RX ADMIN — METOPROLOL SUCCINATE 25 MILLIGRAM(S): 50 TABLET, EXTENDED RELEASE ORAL at 21:54

## 2025-04-06 RX ADMIN — HEPARIN SODIUM 5000 UNIT(S): 1000 INJECTION INTRAVENOUS; SUBCUTANEOUS at 21:53

## 2025-04-06 NOTE — ED ADULT TRIAGE NOTE - CHIEF COMPLAINT QUOTE
Pt c/o burning feeling in L arm that began at 0640, no drift, no numbness, sensation same on both sides, no neuro deficits, fs in triage 140 Pt c/o burning feeling in L arm that began at 0640, no drift, no numbness, sensation same on both arms, no neuro deficits, fs in triage 140 Pt c/o burning feeling in L arm that began at 0640, no drift, no numbness, sensation same on both arms, no neuro deficits, fs in triage 107

## 2025-04-06 NOTE — ED ADULT NURSE NOTE - CHPI ED NUR SYMPTOMS NEG
LINCOLN HEREDIA  75y  Female    Patient is a 75y old  Female who presents with change in mental status.      INTERVAL HPI/OVERNIGHT EVENTS:  -pt seen and examined  -sitting comfortably in bed, no complaints  -bowman inserted this weekend for acute urinary retention > 800cc  -acute anemia this past weekend, s/p 1 unit prbc  -restarted IV heparin gtt, monitor ptt- adjusted rate today  -oob to chair encouraged daily     Vital Signs Last 24 Hrs  T(C): 36.2 (15 Feb 2019 05:04), Max: 37.1 (14 Feb 2019 21:31)  T(F): 97.2 (15 Feb 2019 05:04), Max: 98.7 (14 Feb 2019 21:31)  HR: 82 (15 Feb 2019 05:04) (82 - 88)  BP: 127/63 (15 Feb 2019 05:04) (115/56 - 137/62)  BP(mean): --  RR: 16 (15 Feb 2019 05:04) (16 - 17)  SpO2: --      PHYSICAL EXAM:  GENERAL: NAD  HEAD:  Atraumatic, Normocephalic  EYES: conjunctiva and sclera clear  ENMT: Moist mucous membranes  NECK: Supple, Normal thyroid  NERVOUS SYSTEM:  Awake & Alert, oriented x 2-3, Moves all extremities.   CHEST/LUNG: Clear to auscultation bilaterally; No rales, rhonchi, wheezing, or rubs  CV/HEART: Regular rate and rhythm; No murmurs, rubs, or gallops  GI/ABDOMEN: Soft, Nontender, obese abdomen; Bowel sounds present; Bowman present (cloudy)  EXTREMITIES:  2+ Peripheral Pulses, No clubbing or cyanosis. 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 Left leg ulcer: dressing in place.             Consultant(s) Notes Reviewed:  [x ] YES  [ ] NO  Care Discussed with Consultants/Other Providers [ x] YES  [ ] NO    LABS:                                                  9.5    9.00  )-----------( 268      ( 15 Feb 2019 10:00 )             30.3     02-15    141  |  102  |  22<H>  ----------------------------<  128<H>  4.0   |  25  |  1.5    Ca    9.6      15 Feb 2019 10:00    TPro  5.1<L>  /  Alb  3.0<L>  /  TBili  <0.2  /  DBili  x   /  AST  10  /  ALT  6   /  AlkPhos  46  02-15          Vitamin D, 1, 25-Dihydroxy: 12.4 pg/mL (01.31.19 @ 14:01)  Vitamin D, 25-Hydroxy: 20 ng/mL (01.31.19 @ 09:22)  Intact PTH: 8 pg/mL (01.30.19 @ 06:30)      MICROBIOLOGY:   Culture - Urine (01.31.19 @ 13:57)    Specimen Source: .Urine Clean Catch (Midstream)    Culture Results:   50,000 - 99,000 CFU/mL Yeast-like cells, presumptively not Candida  albicans "Susceptibilities not performed"      Culture - Urine (01.28.19 @ 17:20)    Specimen Source: .Urine Clean Catch (Midstream)    Culture Results: No growth      Culture - Blood (01.28.19 @ 14:16)    Specimen Source: .Blood Blood-Peripheral    Culture Results:   No growth to date.      Culture - Blood (01.28.19 @ 14:14)    Specimen Source: .Blood Blood-Peripheral    Culture Results:   No growth to date.      Culture - Other (01.10.19 @ 14:00)    -  Ciprofloxacin: R >2    -  Gentamicin: R >8    -  Imipenem: R >8    -  Levofloxacin: R >4    -  Meropenem: R >8    -  Piperacillin/Tazobactam: S 16    -  Tobramycin: R >8    -  Aztreonam: I 16    -  Ceftazidime: S 4    -  Cefepime: S 8    -  Amikacin: S 16    Specimen Source: .Other None    Culture Results:   Few Pseudomonas aeruginosa (Carbapenem Resistant)    Organism Identification: Pseudomonas aeruginosa (Carbapenem Resistant)    Organism: Pseudomonas aeruginosa (Carbapenem Resistant)    Method Type: SHANNAN      RADIOLOGY & ADDITIONAL TESTS:  CT Head No Cont (01.28.19 @ 15:54)   1.  The study is limited by motion artifact.    2.  Cerebral atrophy.    3.  Periventricular white matter hypodensities, nonspecific, differential   diagnostic possibilities include ischemic change, gliosis or  demyelination.      X-ray Chest 1 View-PORTABLE IMMEDIATE (01.28.19 @ 13:42)   Support devices: Left-sided PICC with the tip in the distal SVC.    Cardiac/mediastinum/hilum: Grossly stable.    Lung parenchyma/Pleura: Low lung volumes limiting evaluation. No definite   airspace opacity or effusion. Left lower lobe scar/atelectasis.    Skeleton/soft tissues: Stable.    Impression:      Low lung volumes limiting evaluation. No definite airspace opacity or   effusion. Left lower lobe scar/atelectasis.    Imaging Personally Reviewed:  [x] YES  [ ] NO    HEALTH ISSUES - PROBLEM Dx:  Other specified diabetes mellitus with other specified complication, unspecified whether long term insulin use  Rheumatoid arteritis  DVT (deep venous thrombosis)  HTN (hypertension)  Gout  Altered mental status  Osteomyelitis right foot.    MEDICATIONS  (STANDING):  allopurinol 100 milliGRAM(s) Oral daily  cefTAZidime  IVPB 500 milliGRAM(s) IV Intermittent every 24 hours  chlorhexidine 4% Liquid 1 Application(s) Topical <User Schedule>  collagenase Ointment 1 Application(s) Topical daily  dextrose 5%. 1000 milliLiter(s) (50 mL/Hr) IV Continuous <Continuous>  dextrose 50% Injectable 12.5 Gram(s) IV Push once  dextrose 50% Injectable 25 Gram(s) IV Push once  dextrose 50% Injectable 25 Gram(s) IV Push once  docusate sodium 100 milliGRAM(s) Oral two times a day  DULoxetine 30 milliGRAM(s) Oral daily  folic acid 1 milliGRAM(s) Oral daily  gabapentin 100 milliGRAM(s) Oral every 8 hours  heparin  Infusion. 11 Unit(s)/Hr (0.11 mL/Hr) IV Continuous <Continuous>  insulin lispro (HumaLOG) corrective regimen sliding scale   SubCutaneous three times a day before meals  insulin lispro (HumaLOG) corrective regimen sliding scale   SubCutaneous at bedtime  leucovorin 5 milliGRAM(s) Oral daily  polyethylene glycol 3350 17 Gram(s) Oral daily  predniSONE   Tablet 5 milliGRAM(s) Oral daily  senna 2 Tablet(s) Oral at bedtime  tamsulosin 0.4 milliGRAM(s) Oral at bedtime    MEDICATIONS  (PRN):  acetaminophen   Tablet .. 650 milliGRAM(s) Oral every 6 hours PRN Temp greater or equal to 38C (100.4F), Mild Pain (1 - 3)  bisacodyl Suppository 10 milliGRAM(s) Rectal daily PRN Constipation  dextrose 40% Gel 15 Gram(s) Oral once PRN Blood Glucose LESS THAN 70 milliGRAM(s)/deciliter  glucagon  Injectable 1 milliGRAM(s) IntraMuscular once PRN Glucose LESS THAN 70 milligrams/deciliter  oxyCODONE    5 mG/acetaminophen 325 mG 1 Tablet(s) Oral every 8 hours PRN Moderate Pain (4 - 6)  QUEtiapine 25 milliGRAM(s) Oral every 12 hours PRN Anxiety/Agitation no chills

## 2025-04-06 NOTE — PROGRESS NOTE ADULT - SUBJECTIVE AND OBJECTIVE BOX
OPERATIVE PROCEDURE(s): cabg on 2/19  who was d/c'd home on 3/03/25    SURGEON(s): charu    SUBJECTIVE ASSESSMENT: pt is complaining of acute numbness of left arm and of an acute rise in BP    Vital Signs Last 24 Hrs  T(C): 36.8 (06 Apr 2025 15:52), Max: 36.8 (06 Apr 2025 15:52)  T(F): 98.2 (06 Apr 2025 15:52), Max: 98.2 (06 Apr 2025 15:52)  HR: 60 (06 Apr 2025 15:52) (56 - 60)  BP: 148/90 (06 Apr 2025 15:52) (148/90 - 170/114)  BP(mean): --  RR: 18 (06 Apr 2025 15:52) (18 - 20)  SpO2: 97% (06 Apr 2025 15:52) (97% - 99%)    Parameters below as of 06 Apr 2025 15:52  Patient On (Oxygen Delivery Method): room air        Physical Exam  General: alert and oriented x 3 with no neuro deficits   Chest: cta bl  CVS: s1s2  Abd: pos bs soft nt  GI/ :  Ext: no sig edema  incisions- c/d/i  sternum- intact    LABS:                        13.6   5.78  )-----------( 218      ( 06 Apr 2025 10:12 )             41.8     COUMADIN:   [ ] YES [ x] NO    PT/INR - ( 06 Apr 2025 10:12 )   PT: 12.10 sec;   INR: 1.02 ratio         PTT - ( 06 Apr 2025 10:12 )  PTT:32.1 sec  04-06    140  |  106  |  11  ----------------------------<  104[H]  4.5   |  26  |  1.0    Ca    9.2      06 Apr 2025 10:12    TPro  7.5  /  Alb  4.0  /  TBili  0.3  /  DBili  x   /  AST  21  /  ALT  16  /  AlkPhos  72  04-06    Urinalysis Basic - ( 06 Apr 2025 10:12 )    Color: x / Appearance: x / SG: x / pH: x  Gluc: 104 mg/dL / Ketone: x  / Bili: x / Urobili: x   Blood: x / Protein: x / Nitrite: x   Leuk Esterase: x / RBC: x / WBC x   Sq Epi: x / Non Sq Epi: x / Bacteria: x    MEDICATIONS  (STANDING):    MEDICATIONS  (PRN):      Allergies    No Known Allergies    Intolerances    Ambulation/Activity Status:  amb well     RADIOLOGY & ADDITIONAL TESTS:  ACC: [id number] EXAM:  CT ANGIO CHEST PULM ART WAWI   ORDERED BY: BREEZY ALMARAZ     PROCEDURE DATE:  04/06/2025          INTERPRETATION:  CLINICAL INDICATION: Dyspnea. Post CABG February 2025    TECHNIQUE:  CTA of the thorax was performed after administration of intravenous   contrast. Sagittal and coronal reformats were performed as well as MIP   reconstructions.    COMPARISON CT: No prior CT chest. Correlation CT coronary 12/20/2024    INTERPRETATION:    AIRWAYS, LUNGS AND PLEURA: The central tracheobronchial tree is patent.   There is a small right pleural effusion. There is trace left pleural   effusion. There is adjacent atelectasis. There are again demonstrated   peripheral linear opacities, nonspecific. There is no pneumothorax..    HEART AND VESSELS: The heart is stable in size. There is small   pericardial effusion. Atherosclerotic changes including irregular   atheromatous plaque at the level of the aortic arch There are no definite   filling defects in the main pulmonary artery or segmental branches to   suggest pulmonary embolus.    MEDIASTINUM: Nonspecific subcentimeter mediastinal lymph nodes.    BONES AND SOFT TISSUES: There are degenerative changes of the spine.   There is separation of the sternotomy fragments with infiltrative changes   extending to the anterior chest wall subcutaneous region and anterior   mediastinum. No drainable fluid collection noted. There is irregularity   and fragmented appearance of the mid to inferior left sternotomy   fragments. Healingposterior left first rib fracture.    PARTIALLY IMAGED UPPER ABDOMEN: Patient is post cholecystectomy,   partially imaged.    IMPRESSION:  No definite filling defects in the main pulmonary artery or segmental   branches to suggest pulmonary embolus.    Separation of the sternotomy fragments with infiltrative changes   extending to the anterior chest wall subcutaneous region and anterior   mediastinum. No drainable fluid collection noted. There is irregularity   and fragmented appearance of the mid to inferior left sternotomy   fragments. Differential includes infectious etiology.    Small right pleural effusion, trace left pleural effusion with adjacent   mild atelectasis.    Small pericardial effusion  --- End of Report ---    Assessment/Plan: Patient is a 58 yo male s/p CABG x 4 on 2/19 who had a post op course complicated by a.fib who now presents with uncontrolled hypertension- pt is progressing well from surgical standpoint- there is no surgical intervention at the present time  cad, s/p cabg- cont asa, b blocker and a statin; pt r/o for acs being the first set of cardiac enzymes are wnl  please cont management as per medicine / cardiology for blood pressure management  recall prn    Social Service Disposition:

## 2025-04-06 NOTE — ED ADULT NURSE NOTE - CHIEF COMPLAINT QUOTE
Pt c/o burning feeling in L arm that began at 0640, no drift, no numbness, sensation same on both arms, no neuro deficits, fs in triage 107

## 2025-04-06 NOTE — H&P ADULT - NSHPLABSRESULTS_GEN_ALL_CORE
- Telemetry: SR 63 BPM    - ECG (04/06/2025): Sinus Bradycardia 58 BPM    - Intra- Opertaive TAWNY (02/24/2025): LVEF 50 to 55%, Hypokinetic mid-inferior and inferior-septal walls, Normal right ventricular size and function, Trace mitral valve regurgitation, Small immobile plaque involving the descending aorta.    - Radiology:    CTA Chest PE Protocol (04/06/2025):   - No definite filling defects in the main pulmonary artery or segmental branches to suggest pulmonary embolus.  - Separation of the sternotomy fragments with infiltrative changes extending to the anterior chest wall subcutaneous region and anterior mediastinum. No drainable fluid collection noted. There is irregularity and fragmented appearance of the mid to inferior left sternotomy fragments. Differential includes infectious etiology.  - Small right pleural effusion, trace left pleural effusion with adjacent mild atelectasis.  - Small pericardial effusion    CXray (04/06/2025):No radiographic evidence of acute cardiopulmonary disease.      - Labs:                        13.6   5.78  )-----------( 218      ( 06 Apr 2025 10:12 )             41.8     04-06    140  |  106  |  11  ----------------------------<  104[H]  4.5   |  26  |  1.0    Ca    9.2      06 Apr 2025 10:12    TPro  7.5  /  Alb  4.0  /  TBili  0.3  /  DBili  x   /  AST  21  /  ALT  16  /  AlkPhos  72  04-06    LIVER FUNCTIONS - ( 06 Apr 2025 10:12 )  Alb: 4.0 g/dL / Pro: 7.5 g/dL / ALK PHOS: 72 U/L / ALT: 16 U/L / AST: 21 U/L / GGT: x           PT/INR - ( 06 Apr 2025 10:12 )   PT: 12.10 sec;   INR: 1.02 ratio       PTT - ( 06 Apr 2025 10:12 )  PTT:32.1 sec    Lactate Trend    Urinalysis Basic - ( 06 Apr 2025 10:12 )    Color: x / Appearance: x / SG: x / pH: x  Gluc: 104 mg/dL / Ketone: x  / Bili: x / Urobili: x   Blood: x / Protein: x / Nitrite: x   Leuk Esterase: x / RBC: x / WBC x   Sq Epi: x / Non Sq Epi: x / Bacteria: x

## 2025-04-06 NOTE — ED PROVIDER NOTE - CONSIDERATION OF ADMISSION OBSERVATION
Patient with (+) chest pain, PNATOJA, elevated troponin, will require admission Consideration of Admission/Observation

## 2025-04-06 NOTE — PATIENT PROFILE ADULT - FUNCTIONAL ASSESSMENT - BASIC MOBILITY 6.
4-calculated by average/Not able to assess (calculate score using [facility] averaging method)

## 2025-04-06 NOTE — ED PROVIDER NOTE - CCCP TRG CHIEF CMPLNT
Partially impaired: cannot see medication labels or newsprint, but can see obstacles in path, and the surrounding layout; can count fingers at arm's length
medical evaluation

## 2025-04-06 NOTE — ED PROVIDER NOTE - CARE PLAN
Principal Discharge DX:	Dyspnea on exertion  Secondary Diagnosis:	Orthopnea  Secondary Diagnosis:	Left arm pain  Secondary Diagnosis:	Elevated troponin   1

## 2025-04-06 NOTE — ED ADULT NURSE NOTE - OBJECTIVE STATEMENT
Patient S/P open hear surgery report start felling headache yesterday want way  .Today patient  was walking his Dog start felling diaphoretic ,left forearm  burning sensation  SOB on exertion , at home took his BP was elevated over 200/100  .Pt state after the open hear Surgery  on FEb/23  BP medication  was stopped Losartan 40 mg po ,hi took Losartan 40 mg po yesterday and this morning at  0700 AM ,Pt denies chest pain no fever no N/V/D no neuro deficits BP at this time 183/106.

## 2025-04-06 NOTE — ED PROVIDER NOTE - CLINICAL SUMMARY MEDICAL DECISION MAKING FREE TEXT BOX
Patient presented with chest pain, dyspnea on exertion as documented status post recent CABG.  Also elevated blood pressure readings at home.  On arrival, patient afebrile, hemodynamically stable, neurovascularly intact, but hypertensive on arrival.  EKG obtained and nonspecific, but no evidence of STEMI.  Obtained labs which showed elevated troponin and proBNP but otherwise were grossly unremarkable including no significant leukocytosis, anemia, signs of dehydration/DANAE, transaminitis or significant electrolyte abnormalities.  Chest xray negative for pneumothorax, pneumonia, widened mediastinum, evidence of rib fractures, enlarged cardiac silhouette or any other emergent pathologies.  CTA of the chest negative for PE or any other emergent pathologies.  CT surgery consulted given recent CABG, however given that patient is over 30 days from surgery as well as no significant findings on CT, no surgical intervention from their standpoint but will follow.  Consulted cardiology who will admit to cardiac telemetry at this time for further management.  Patient agreeable with plan.  Hemodynamically stable at time of admission.

## 2025-04-06 NOTE — PATIENT PROFILE ADULT - FALL HARM RISK - UNIVERSAL INTERVENTIONS
Bed in lowest position, wheels locked, appropriate side rails in place/Call bell, personal items and telephone in reach/Instruct patient to call for assistance before getting out of bed or chair/Non-slip footwear when patient is out of bed/[place] to call system/Physically safe environment - no spills, clutter or unnecessary equipment/Purposeful Proactive Rounding/Room/bathroom lighting operational, light cord in reach

## 2025-04-06 NOTE — H&P ADULT - HISTORY OF PRESENT ILLNESS
Mr. Guillen is a 59 year old male with a PMHx of CAD [facility] [place] [facility] [facility] [place], HTN, HLD, nephrolithiasis, GERD, Cholecystitis (s/p lap abdulaziz), AAA without prior surgical intervention, obesity, former smoker (smoked for 40 years, quit 4 years ago), chronic back pain who presents to the ED with HTN and left arm pain. Patient states that on the day prior to presentation, he had a headache that prompted him to check his blood pressure. That reading was 187/97 and at that time he states he took an old prescription of Valsartan and went to sleep. On the morning of presentation, patient states that he was walking his dog when he experienced torrential sweating and shortness of breath. When he returned home he checked his blood pressure which read 220/125. At this time he states he also had left arm burning pain which is when he decided to come to the emergency room.   In the ED his blood pressure was 170/114 and repeat was 183/106. EKG was sinus bradycardia 58 BPM. Troponins negative x 1.CTA performed to r/o PE which was (-) but did show separation of the sternotomy fragments with infiltrative changes, small right pleural effusion, trace left pleural effusion and small pericardial effusion.  Patient being admitted to cardiac telemetry to r/o ACS and management of HTN.     Mr. Guillen is a 59 year old male with a [facility], HLD, nephrolithiasis, GERD, Cholecystitis (s/p lap abdulaziz), AAA without prior surgical intervention, obesity, former smoker (smoked for 40 years, quit 4 years ago), chronic back pain who presents to the ED with HTN and left arm pain. Patient states that on the day prior to presentation, he had a headache that prompted him to check his blood pressure. That reading was 187/97 and at that time he states he took an old prescription of Valsartan and went to sleep. On the morning of presentation, patient states that he was walking his dog when he experienced torrential sweating and shortness of breath. When he returned home he checked his blood pressure which read 220/125. At this time he states he also had left arm burning pain which is when he decided to come to the emergency room.   In the ED his blood pressure was 170/114 and repeat was 183/106. EKG was sinus bradycardia 58 BPM. Troponins negative x 1.CTA performed to r/o PE which was (-) but did show separation of the sternotomy fragments with infiltrative changes, small right pleural effusion, trace left pleural effusion and small pericardial effusion.       Mr. Guillen is a 59 year old male with a [facility], nephrolithiasis, GERD, Cholecystitis (s/p lap abdulaziz), AAA without prior surgical intervention, obesity, former smoker (smoked for 40 years, quit 4 years ago), chronic back pain who presents to the ED with HTN and left arm pain. Patient states that on the day prior to presentation, he had a headache that prompted him to check his blood pressure. That reading was 187/97 and at that time he states he took an old prescription of Valsartan and went to sleep. On the morning of presentation, patient states that he was walking his dog when he experienced torrential sweating and shortness of breath. When he returned home he checked his blood pressure which read 220/125. At this time he states he also had left arm burning pain which is when he decided to come to the emergency room.   In the ED his blood pressure was 170/114 and repeat was 183/106. EKG was sinus bradycardia 58 BPM. Troponins negative x 1.CTA performed to r/o PE which was (-) but did show separation of the sternotomy fragments with infiltrative changes, small right pleural effusion, trace left pleural effusion and small pericardial effusion.

## 2025-04-06 NOTE — H&P ADULT - NSHPPHYSICALEXAM_GEN_ALL_CORE
VITALS:   T(C): 36.7 (04-06-25 @ 17:24), Max: 36.8 (04-06-25 @ 15:52)  HR: 64 (04-06-25 @ 17:24) (56 - 64)  BP: 143/86 (04-06-25 @ 17:24) (143/86 - 170/114)  RR: 18 (04-06-25 @ 17:24) (18 - 20)  SpO2: 99% (04-06-25 @ 17:24) (97% - 99%)    GENERAL: NAD, lying in bed comfortably  HEAD:  Atraumatic, normocephalic  EYES: EOMI, PERRLA, conjunctiva and sclera clear  ENT: Moist mucous membranes  NECK: Supple, no JVD  HEART: Regular rate and rhythm, no murmurs, rubs, or gallops  LUNGS: Unlabored respirations.  Clear to auscultation bilaterally, no crackles, wheezing, or rhonchi  ABDOMEN: Soft, nontender, nondistended, +BS  EXTREMITIES: 2+ peripheral pulses bilaterally. No clubbing, cyanosis, or edema  NERVOUS SYSTEM:  A&Ox3, no focal deficits   SKIN: No rashes or lesions

## 2025-04-06 NOTE — H&P ADULT - NS ATTEND AMEND GEN_ALL_CORE FT
59yoM with CAD s/p CABG (LIMA-LAD, RSVG -Diag, OM and RCA on 2/19/25 with Dr. Martin), HTN, AAA being monitored, obesity, and prior tobacco use who presented to the ED with high BP readings and L arm pain. ECG with SB and T wave flattening.      Plan:  - Continue ASA 81 mg daily, Plavix 75 mg daily, amiodarone 200 mg daily, and Lopressor 25 mg q8h  - Will consider decreasing Lopressor to BID dosing for ease of taking medications  - Transaminitis has resolved, start atorvastatin 40 mg nightly  - A1c 6.0,   - Discontinue pantoprazole as this is not a home medication  - Will start amlodipine as LVEF is normal on last echo  - Will assess need for Lasix as patient was discharged on Lasix 20 mg PO BID 59yoM with CAD s/p CABG (LIMA-LAD, RSVG -Diag, OM and RCA on 2/19/25 with Dr. Martin), AAA (3.7 cm with eccentric mural thrombus on CT scan dated 2/07/25), obesity, and prior tobacco use who presented to the ED with high BP readings and L arm pain, admitted for HTN urgency in the setting of high salt intake. ECG with SB and T wave flattening.      Plan:  - Continue ASA 81 mg daily, Plavix 75 mg daily, and amiodarone 200 mg daily  - Will change Lopressor to Toprol 50 mg daily for ease of taking medications  - Transaminitis has resolved, start atorvastatin 40 mg nightly  - A1c 6.0,   - Discontinue pantoprazole if it is not a home medication  - No need to start antiHTN medication as BP is controlled here, often SBP 100s at home, and was only elevated in the setting of excessive salt intake  - Will discharge with HCTZ 12.5 mg PRN   - No need for Lasix, patient has run out of Rx and has no s/s of volume overload

## 2025-04-06 NOTE — ED PROVIDER NOTE - PROGRESS NOTE DETAILS
SUNG: CXR grossly unremarkable. Given unexplained dyspnea, will obtain CTA chest given recent surgery to r/o PE SUNG: CTA chest pending. Case discussed with CT surgery - will evaluate patient, likely no intervention from their standpoint given >30 days post-op. SUNG: Patient accepted to cardio tele pending CTA chest results. SUNG: CTA back - no PE. Cardio tele to admit to Dr. Behuria

## 2025-04-06 NOTE — ED PROVIDER NOTE - OBJECTIVE STATEMENT
59-year-old male, past medical history as documented, s/p recent CABG presenting with left arm pain as well as elevated blood pressure readings at home.  Patient also complaining of dyspnea and chest pain on exertion as well as an episode of diaphoresis while walking his dog.  Otherwise denies fevers, cough, palpitations, abdominal pain, nausea/vomiting/diarrhea, blood in stool, urinary symptoms or any other complaints.

## 2025-04-06 NOTE — H&P ADULT - ASSESSMENT
Assessment: 59 year old male with a PMHx of CAD s/p CABG on 02/19/2025 @ [facility], paroxysmal Afib not on AC, HTN, HLD, Nephrolithiasis, GERD, Cholecystitis (s/p lap abdulaziz), AAA without prior surgical intervention, obesity, former smoker (smoked for 40 years, quit 4 years ago), chronic back pain who presents to the ED with HTN and left arm pain. Admitted to cardiac telemetry to r/o ACS and management of HTN.    Plan:    #Dyspnea on Exertion  - TTE   - trend troponin to peak  - cont. ASA and Plavix    #Hypertensive Urgency  - monitor BP with gradual reduction  - cont. Metoprolol Tartate 25mg TID  - AM EKG    #pAfib  - cont. Amiodarone 200mg daily    #GERD  - cont. Protonix 40mg daily    #Chronic Back Pain  - cont. Robaxin 750mg TID    Please call [phone] with any questions or concerns         Assessment: 59 year old male with a PMHx of CAD s/p CABG on 02/19/2025 @ [facility], paroxysmal Afib not on AC, HTN, HLD, Nephrolithiasis, GERD, Cholecystitis (s/p lap abdulaziz), AAA without prior surgical intervention, obesity, former smoker (smoked for 40 years, quit 4 years ago), chronic back pain who presents to the ED with HTN and left arm pain. Admitted to cardiac telemetry to r/o ACS and management of HTN.    Plan:    #Dyspnea on Exertion  - TTE   - trend troponin to peak  - cont. ASA and Plavix    #Hypertensive Urgency  - monitor BP with gradual reduction  - cont. Metoprolol Tartate 25mg TID  - AM EKG    #Right Lower Extremity Wound  - wound team consult    #pAfib  - cont. Amiodarone 200mg daily    #GERD  - cont. Protonix 40mg daily    #Chronic Back Pain  - cont. Robaxin 750mg TID    Please call [phone] with any questions or concerns         Assessment: 59 year old male with a PMHx of CAD s/p CABG on 02/19/2025 @ [facility], paroxysmal Afib not on AC, HTN, Nephrolithiasis, GERD, Cholecystitis (s/p lap abdulaziz), AAA without prior surgical intervention, obesity, former smoker (smoked for 40 years, quit 4 years ago), chronic back pain who presents to the ED with HTN and left arm pain. Admitted to cardiac telemetry to r/o ACS and management of HTN.    Plan:    #Dyspnea on Exertion  - TTE   - trend troponin to peak  - cont. ASA and Plavix    #Hypertensive Urgency  - monitor BP with gradual reduction  - cont. Metoprolol Tartate 25mg TID  - AM EKG    #Right Lower Extremity Wound  - wound team consult    #pAfib  - cont. Amiodarone 200mg daily    #GERD  - cont. Protonix 40mg daily    #Chronic Back Pain  - cont. Robaxin 750mg TID    Please call [phone] with any questions or concerns

## 2025-04-06 NOTE — ED ADULT NURSE NOTE - AVIAN FLU SYMPTOMS
Patient is here for a Nurse Check.      Reviewed and verified Advanced Directives: Yes: Advance Directive is in chart     Is the patient on an active anti-cancer treatment plan? Yes,   Regimen: Vidaza/Venetoclax  Cycle/Day: C9, D19    Oral Chemotherapy: Yes, Patient is taking medication as prescribed.    ECOG [11/22/24 1400]   ECOG Performance Status 1        Nursing Assessment:   A focused nursing assessment addressing the toxicity of chemotherapy was performed and the patient reports the following:  Anxiety/Depression/Insomnia: Anxiety: No, Depression: No, and Insomnia: No  Pain: NO    Toxicity Assessment    Auditory/Ear  Assessment: Yes (Within Defined Limits)    Cardiac General  Assessment: Yes (w/ Exceptions to WDL)  Hypertension: Grade 1    Constitutional  Assessment: Yes (w/ Exceptions to WDL)  Fatigue: Grade 1    Dermatology/Skin  Assessment: Yes (Within Defined Limits)    Endocrine  Assessment: Yes (Within Defined Limits)    Gastrointestinal  Assessment: Yes (w/ Exceptions to WDL)  Anorexia: Grade 1    Hemorrhage/Bleeding  Assessment: Yes (Within Defined Limits)    Infection  Assessment: Yes (Within Defined Limits)    Lymphatics  Assessment: Yes (Within Defined Limits)    Musculoskeletal  Assessment: Yes (w/ Exceptions to WDL)  Generalized Muscle Weakness: Grade 1    Neurology  Assessment: Yes (Within Defined Limits)    Ocular  Assessment: Yes (Within Defined Limits)    Pain  Assessment: Yes (Within Defined Limits)    Pulmonary/Upper Respiratory  Assessment: Yes (Within Defined Limits)    Genitourinary  Assessment: Yes (Within Defined Limits)        Is this patient status post Stem Cell Transplant? No      Tennille Downing PA-C is supervising clinician today.    Vitals:    11/22/24 1420   BP: 122/60   Pulse: 77   Resp: 18   Temp: 97.5 °F (36.4 °C)   TempSrc: Oral   SpO2: 100%        Central Line Care: See Invasive (Oncology) Lines Flowsheet and MAR for CVAD documentation as appropriate    Transfusion: Not  needed    Education: Patient Education: Other teaching: maintain oral hydration    Next appointment scheduled:   Future Appointments   Date Time Provider Department Center   11/25/2024  1:45 PM [facility] ACL LAB [facility]   11/25/2024  2:00 PM [facility] INFUSION RN [facility]   12/2/2024 10:15 AM [facility] ACL LAB [facility]   12/2/2024 10:45 AM Antonio Calvillo MD [facility]   12/3/2024  1:30 PM [facility] INFUSION RN [facility]   12/4/2024  1:30 PM [facility] INFUSION RN [facility]   12/5/2024  1:30 PM [facility] INFUSION RN [facility]   12/6/2024  1:30 PM [facility] INFUSION RN [facility]   1/6/2025  9:00 AM Nicholas Rivera DO [facility]   1/13/2025  8:15 AM Joni Phillips MD [facility]       Patient instructed to call the office with any questions or concerns.    Patient Discharged: patient discharged to home per self, ambulatory, to home   No

## 2025-04-07 ENCOUNTER — TRANSCRIPTION ENCOUNTER (OUTPATIENT)
Age: 60
End: 2025-04-07

## 2025-04-07 ENCOUNTER — RESULT REVIEW (OUTPATIENT)
Age: 60
End: 2025-04-07

## 2025-04-07 VITALS — DIASTOLIC BLOOD PRESSURE: 83 MMHG | SYSTOLIC BLOOD PRESSURE: 144 MMHG

## 2025-04-07 LAB
A1C WITH ESTIMATED AVERAGE GLUCOSE RESULT: 5.2 % — SIGNIFICANT CHANGE UP (ref 4–5.6)
ANION GAP SERPL CALC-SCNC: 9 MMOL/L — SIGNIFICANT CHANGE UP (ref 7–14)
BUN SERPL-MCNC: 9 MG/DL — LOW (ref 10–20)
CALCIUM SERPL-MCNC: 9.1 MG/DL — SIGNIFICANT CHANGE UP (ref 8.4–10.5)
CHLORIDE SERPL-SCNC: 104 MMOL/L — SIGNIFICANT CHANGE UP (ref 98–110)
CHOLEST SERPL-MCNC: 197 MG/DL — SIGNIFICANT CHANGE UP
CO2 SERPL-SCNC: 27 MMOL/L — SIGNIFICANT CHANGE UP (ref 17–32)
CREAT SERPL-MCNC: 1.2 MG/DL — SIGNIFICANT CHANGE UP (ref 0.7–1.5)
EGFR: 69 ML/MIN/1.73M2 — SIGNIFICANT CHANGE UP
EGFR: 69 ML/MIN/1.73M2 — SIGNIFICANT CHANGE UP
ESTIMATED AVERAGE GLUCOSE: 103 MG/DL — SIGNIFICANT CHANGE UP (ref 68–114)
GLUCOSE SERPL-MCNC: 104 MG/DL — HIGH (ref 70–99)
HCT VFR BLD CALC: 40 % — LOW (ref 42–52)
HDLC SERPL-MCNC: 37 MG/DL — LOW
HGB BLD-MCNC: 13.2 G/DL — LOW (ref 14–18)
LDLC SERPL-MCNC: 133 MG/DL — HIGH
LIPID PNL WITH DIRECT LDL SERPL: 133 MG/DL — HIGH
MAGNESIUM SERPL-MCNC: 1.8 MG/DL — SIGNIFICANT CHANGE UP (ref 1.8–2.4)
MCHC RBC-ENTMCNC: 29.1 PG — SIGNIFICANT CHANGE UP (ref 27–31)
MCHC RBC-ENTMCNC: 33 G/DL — SIGNIFICANT CHANGE UP (ref 32–37)
MCV RBC AUTO: 88.3 FL — SIGNIFICANT CHANGE UP (ref 80–94)
NONHDLC SERPL-MCNC: 160 MG/DL — HIGH
NRBC BLD AUTO-RTO: 0 /100 WBCS — SIGNIFICANT CHANGE UP (ref 0–0)
PLATELET # BLD AUTO: 203 K/UL — SIGNIFICANT CHANGE UP (ref 130–400)
PMV BLD: 9.2 FL — SIGNIFICANT CHANGE UP (ref 7.4–10.4)
POTASSIUM SERPL-MCNC: 4.1 MMOL/L — SIGNIFICANT CHANGE UP (ref 3.5–5)
POTASSIUM SERPL-SCNC: 4.1 MMOL/L — SIGNIFICANT CHANGE UP (ref 3.5–5)
RBC # BLD: 4.53 M/UL — LOW (ref 4.7–6.1)
RBC # FLD: 13.2 % — SIGNIFICANT CHANGE UP (ref 11.5–14.5)
SODIUM SERPL-SCNC: 140 MMOL/L — SIGNIFICANT CHANGE UP (ref 135–146)
T4 FREE SERPL-MCNC: 1.4 NG/DL — SIGNIFICANT CHANGE UP (ref 0.9–1.7)
TRIGL SERPL-MCNC: 152 MG/DL — HIGH
TSH SERPL-MCNC: 2.75 UIU/ML — SIGNIFICANT CHANGE UP (ref 0.27–4.2)
WBC # BLD: 5.34 K/UL — SIGNIFICANT CHANGE UP (ref 4.8–10.8)
WBC # FLD AUTO: 5.34 K/UL — SIGNIFICANT CHANGE UP (ref 4.8–10.8)

## 2025-04-07 PROCEDURE — 93306 TTE W/DOPPLER COMPLETE: CPT | Mod: 26

## 2025-04-07 PROCEDURE — 99235 HOSP IP/OBS SAME DATE MOD 70: CPT

## 2025-04-07 PROCEDURE — 93010 ELECTROCARDIOGRAM REPORT: CPT

## 2025-04-07 RX ORDER — METOPROLOL SUCCINATE 50 MG/1
50 TABLET, EXTENDED RELEASE ORAL DAILY
Refills: 0 | Status: DISCONTINUED | OUTPATIENT
Start: 2025-04-08 | End: 2025-04-07

## 2025-04-07 RX ORDER — HYDROCHLOROTHIAZIDE 50 MG/1
1 TABLET ORAL
Qty: 30 | Refills: 0
Start: 2025-04-07 | End: 2025-05-06

## 2025-04-07 RX ORDER — CEPHALEXIN 250 MG/1
1 CAPSULE ORAL
Qty: 10 | Refills: 0
Start: 2025-04-07 | End: 2025-04-11

## 2025-04-07 RX ORDER — METOPROLOL SUCCINATE 50 MG/1
1 TABLET, EXTENDED RELEASE ORAL
Qty: 30 | Refills: 0
Start: 2025-04-07 | End: 2025-05-06

## 2025-04-07 RX ORDER — AMLODIPINE BESYLATE 10 MG/1
5 TABLET ORAL DAILY
Refills: 0 | Status: DISCONTINUED | OUTPATIENT
Start: 2025-04-07 | End: 2025-04-07

## 2025-04-07 RX ORDER — ATORVASTATIN CALCIUM 80 MG/1
40 TABLET, FILM COATED ORAL AT BEDTIME
Refills: 0 | Status: DISCONTINUED | OUTPATIENT
Start: 2025-04-07 | End: 2025-04-07

## 2025-04-07 RX ORDER — ATORVASTATIN CALCIUM 80 MG/1
1 TABLET, FILM COATED ORAL
Qty: 30 | Refills: 0
Start: 2025-04-07 | End: 2025-05-06

## 2025-04-07 RX ORDER — MUPIROCIN CALCIUM 20 MG/G
1 CREAM TOPICAL
Qty: 1 | Refills: 0
Start: 2025-04-07 | End: 2025-04-16

## 2025-04-07 RX ORDER — MAGNESIUM SULFATE 500 MG/ML
2 SYRINGE (ML) INJECTION ONCE
Refills: 0 | Status: COMPLETED | OUTPATIENT
Start: 2025-04-07 | End: 2025-04-07

## 2025-04-07 RX ORDER — ATORVASTATIN CALCIUM 80 MG/1
1 TABLET, FILM COATED ORAL
Qty: 0 | Refills: 0 | DISCHARGE
Start: 2025-04-07

## 2025-04-07 RX ORDER — AMIODARONE HYDROCHLORIDE 50 MG/ML
1 INJECTION, SOLUTION INTRAVENOUS
Qty: 30 | Refills: 0
Start: 2025-04-07 | End: 2025-05-06

## 2025-04-07 RX ORDER — METOPROLOL SUCCINATE 50 MG/1
1 TABLET, EXTENDED RELEASE ORAL
Qty: 0 | Refills: 0 | DISCHARGE
Start: 2025-04-07

## 2025-04-07 RX ADMIN — METHOCARBAMOL 750 MILLIGRAM(S): 500 TABLET, FILM COATED ORAL at 05:50

## 2025-04-07 RX ADMIN — Medication 1 APPLICATION(S): at 06:22

## 2025-04-07 RX ADMIN — METHOCARBAMOL 750 MILLIGRAM(S): 500 TABLET, FILM COATED ORAL at 13:02

## 2025-04-07 RX ADMIN — FOLIC ACID 1 MILLIGRAM(S): 1 TABLET ORAL at 11:42

## 2025-04-07 RX ADMIN — HEPARIN SODIUM 5000 UNIT(S): 1000 INJECTION INTRAVENOUS; SUBCUTANEOUS at 13:02

## 2025-04-07 RX ADMIN — AMIODARONE HYDROCHLORIDE 200 MILLIGRAM(S): 50 INJECTION, SOLUTION INTRAVENOUS at 06:23

## 2025-04-07 RX ADMIN — CLOPIDOGREL BISULFATE 75 MILLIGRAM(S): 75 TABLET, FILM COATED ORAL at 11:18

## 2025-04-07 RX ADMIN — Medication 25 GRAM(S): at 11:42

## 2025-04-07 RX ADMIN — Medication 40 MILLIGRAM(S): at 05:50

## 2025-04-07 RX ADMIN — HEPARIN SODIUM 5000 UNIT(S): 1000 INJECTION INTRAVENOUS; SUBCUTANEOUS at 05:50

## 2025-04-07 RX ADMIN — METOPROLOL SUCCINATE 25 MILLIGRAM(S): 50 TABLET, EXTENDED RELEASE ORAL at 05:50

## 2025-04-07 RX ADMIN — Medication 81 MILLIGRAM(S): at 11:19

## 2025-04-07 NOTE — DISCHARGE NOTE PROVIDER - CARE PROVIDER_API CALL
Aman Chairez.  Interventional Cardiology  [address] [place] [id number]  Phone: [phone]  Fax: [phone]  Follow Up Time: 2 weeks   Aman Chairez  Interventional Cardiology  [address] [place] [id number]  Phone: [phone]  Fax: [phone]  Follow Up Time: 2 weeks    Wilfred Martin  Thoracic and Cardiac Surgery  [address] [place] [address], [address]  [place] [address]  Phone: [phone]  Fax: [phone]  Follow Up Time: 1 week

## 2025-04-07 NOTE — ADVANCED PRACTICE NURSE CONSULT - RECOMMEDATIONS
Cleanse wound to right lower leg with soap and water.   Pat dry, apply mupirocin cover with dry sterile dressing twice a day and prn for soiling.     Keep skin clean.   Monitor skin for changes and notify provider   Case discussed with NP Cleanse wound to right lower leg with soap and water.   Pat dry, apply mupirocin cover with dry sterile dressing twice a day and prn for soiling.     Keep skin clean.   Monitor skin for changes and notify provider   Rest of care per primary team  Case discussed with PA

## 2025-04-07 NOTE — DISCHARGE NOTE PROVIDER - HOSPITAL COURSE
59yoM with CAD s/p CABG (LIMA-LAD, RSVG -Diag, OM and RCA on 2/19/25 with Dr. Martin), AAA (3.7 cm with eccentric mural thrombus on CT scan dated 2/07/25), obesity, and prior tobacco use who presented to the ED with high BP readings and L arm pain, admitted for HTN urgency in the setting of high salt intake. ECG with SB and T wave flattening.      Plan:  - Continue ASA 81 mg daily, Plavix 75 mg daily, and amiodarone 200 mg daily  - Will change Lopressor to Toprol 50 mg daily for ease of taking medications  - Transaminitis has resolved, start atorvastatin 40 mg nightly  - A1c 6.0,   - Discontinue pantoprazole if it is not a home medication  - No need to start antiHTN medication as BP is controlled here, often SBP 100s at home, and was only elevated in the setting of excessive salt intake  - Will discharge with HCTZ 12.5 mg PRN   - No need for Lasix, patient has run out of Rx and has no s/s of volume overload. 59yoM with CAD s/p CABG (LIMA-LAD, RSVG -Diag, OM and RCA on 2/19/25 with Dr. Martin), AAA (3.7 cm with eccentric mural thrombus on CT scan dated 2/07/25), obesity, and prior tobacco use who presented to the ED with high BP readings and L arm pain, admitted for HTN urgency in the setting of high salt intake. ECG with SB and T wave flattening. Pt admits to excessive salt intake at home. Pt was educated and given a handout on the importance of cutting his salt intake. Pt will continue Toprol (metoprolol succinate) 50 mg daily, Amiodarone 200mg daily, Aspirin 81mg daily, Plavix 75mg daily, Atorvastatin 40mg daily at night, HCTZ 12.5mg PRN.     A1c 6.0,     TTE: 4/7/25   1. Technically difficult and limited study.   2. Endocardial visualization was enhanced with intravenous echo contrast.   3. Left ventricular ejection fraction, by visual estimation, is 55 to   60%.   4. Spectral Doppler shows impaired relaxation pattern of left   ventricular myocardial filling (Grade I diastolic dysfunction).   5. Aortic valve was not well visualized; appears calcified with grossly   normal opening.     59yoM with CAD s/p CABG (LIMA-LAD, RSVG -Diag, OM and RCA on 2/19/25 with Dr. Martin), AAA (3.7 cm with eccentric mural thrombus on CT scan dated 2/07/25), obesity, and prior tobacco use who presented to the ED with high BP readings and L arm pain, admitted for HTN urgency in the setting of high salt intake. ECG with SB and T wave flattening. Pt admits to excessive salt intake at home. Pt was educated and given a handout on the importance of cutting his salt intake. Pt will continue Toprol (metoprolol succinate) 50 mg daily, Amiodarone 200mg daily, Aspirin 81mg daily, Plavix 75mg daily, Atorvastatin 40mg daily at night, HCTZ 12.5mg PRN.   Patient has an unhealing right lower extremity wound from CABG harvest site. Pt will need to follow up with CTS in 1 week. Pt will be discharged on Keflex 500mg PO BID for 5 days.     A1c 6.0,     TTE: 4/7/25   1. Technically difficult and limited study.   2. Endocardial visualization was enhanced with intravenous echo contrast.   3. Left ventricular ejection fraction, by visual estimation, is 55 to   60%.   4. Spectral Doppler shows impaired relaxation pattern of left   ventricular myocardial filling (Grade I diastolic dysfunction).   5. Aortic valve was not well visualized; appears calcified with grossly   normal opening.     59yoM with CAD s/p CABG (LIMA-LAD, RSVG -Diag, OM and RCA on 2/19/25 with Dr. Martin), AAA (3.7 cm with eccentric mural thrombus on CT scan dated 2/07/25), obesity, and prior tobacco use who presented to the ED with high BP readings and L arm pain, admitted for HTN urgency in the setting of high salt intake. ECG with SB and T wave flattening. Pt admits to excessive salt intake at home. Pt was educated and given a handout on the importance of cutting his salt intake. Pt will continue Toprol (metoprolol succinate) 50 mg daily, Amiodarone 200mg daily, Aspirin 81mg daily, Plavix 75mg daily, Atorvastatin 40mg daily at night, HCTZ 12.5mg PRN.   Patient has an unhealing right lower extremity wound from CABG harvest site. Pt will need to follow up with CTS in 1 week. Pt will be discharged on Keflex 500mg PO BID for 5 days. Wound care saw pt and recommended to cleanse wound to right lower leg with soap and water and pat dry, apply mupirocin cover with dry sterile dressing twice a day and prn for soiling.     A1c 6.0,     TTE: 4/7/25   1. Technically difficult and limited study.   2. Endocardial visualization was enhanced with intravenous echo contrast.   3. Left ventricular ejection fraction, by visual estimation, is 55 to   60%.   4. Spectral Doppler shows impaired relaxation pattern of left   ventricular myocardial filling (Grade I diastolic dysfunction).   5. Aortic valve was not well visualized; appears calcified with grossly   normal opening.     59yoM with CAD s/p CABG (LIMA-LAD, RSVG -Diag, OM and RCA on 2/19/25 with Dr. Martin), AAA (3.7 cm with eccentric mural thrombus on CT scan dated 2/07/25), obesity, and prior tobacco use who presented to the ED with high BP readings and L arm pain, admitted for HTN urgency in the setting of high salt intake. ECG with SB and T wave flattening. Pt admits to excessive salt intake at home. Pt was educated and given a handout on the importance of cutting his salt intake. Pt will continue Toprol (metoprolol succinate) 50 mg daily, Amiodarone 200mg daily, Aspirin 81mg daily, Plavix 75mg daily, Atorvastatin 40mg daily at night, HCTZ 12.5mg PRN.   Patient has an unhealing right lower extremity wound from CABG harvest site. Pt will need to follow up with CTS in 1 week. Pt will be discharged on Keflex 500mg PO BID for 5 days. Wound care saw pt and recommended to cleanse wound to right lower leg with soap and water and pat dry, apply mupirocin cover with dry sterile dressing twice a day and prn for soiling.     A1c 6.0,     TTE: 4/7/25   1. Technically difficult and limited study.   2. Endocardial visualization was enhanced with intravenous echo contrast.   3. Left ventricular ejection fraction, by visual estimation, is 55 to   60%.   4. Spectral Doppler shows impaired relaxation pattern of left   ventricular myocardial filling (Grade I diastolic dysfunction).   5. Aortic valve was not well visualized; appears calcified with grossly   normal opening.    Called pt daughter Sandra to inform her that amiodarone 200mg is QD.

## 2025-04-07 NOTE — DISCHARGE NOTE PROVIDER - NSDCFUSCHEDAPPT_GEN_ALL_CORE_FT
Marva Gould  [facility] Physician [facility]  CTSURG [address]  Scheduled Appointment: 04/08/2025    Aman Chairez  [facility]  CARDIOLOGY [address]  Scheduled Appointment: 04/28/2025

## 2025-04-07 NOTE — DISCHARGE NOTE NURSING/CASE MANAGEMENT/SOCIAL WORK - FINANCIAL ASSISTANCE
[facility] provides services at a reduced cost to those who are determined to be eligible through [facility]’s financial assistance program. Information regarding [facility]’s financial assistance program can be found by going to [email or web address] or by calling [phone].

## 2025-04-07 NOTE — ADVANCED PRACTICE NURSE CONSULT - ASSESSMENT
History of Present Illness:   Mr. Guillen is a 59 year old male with a PMHx of [facility], HTN, nephrolithiasis, GERD, Cholecystitis (s/p lap abdulaziz), AAA without prior surgical intervention, obesity, former smoker (smoked for 40 years, quit 4 years ago), chronic back pain who presents to the ED with HTN and left arm pain. Patient states that on the day prior to presentation, he had a headache that prompted him to check his blood pressure. That reading was 187/97 and at that time he states he took an old prescription of Valsartan and went to sleep. On the morning of presentation, patient states that he was walking his dog when he experienced torrential sweating and shortness of breath. When he returned home he checked his blood pressure which read 220/125. At this time he states he also had left arm burning pain which is when he decided to come to the emergency room.   In the ED his blood pressure was 170/114 and repeat was 183/106. EKG was sinus bradycardia 58 BPM. Troponins negative x 1.CTA performed to r/o PE which was (-) but did show separation of the sternotomy fragments with infiltrative changes, small right pleural effusion, trace left pleural effusion and small pericardial effusion.        Patient received lying in bed. Alert and oriented. Consulted for wound to right lower leg. Patient states has had wound since February after surgery.    Type of : Surgical wound  Location: Right lower leg  Wound bed: Surgical incision with mild erythema. No exudate, no warmth  Wound odor: No  Wound pain: No    Patient states following up with surgeon tomorrow.

## 2025-04-07 NOTE — DISCHARGE NOTE PROVIDER - PROVIDER TOKENS
PROVIDER:[TOKEN:[[id number]],FOLLOWUP:[2 weeks]] PROVIDER:[TOKEN:[[id number]],FOLLOWUP:[2 weeks]],PROVIDER:[TOKEN:[[id number]],FOLLOWUP:[1 week]]

## 2025-04-07 NOTE — DISCHARGE NOTE PROVIDER - NSDCCPCAREPLAN_GEN_ALL_CORE_FT
PRINCIPAL DISCHARGE DIAGNOSIS  Diagnosis: Hypertensive urgency  Assessment and Plan of Treatment: Please continue Toprol (metoprolol succinate) 50 mg daily, Amiodarone 200mg daily, Aspirin 81mg daily, Plavix 75mg daily, Atorvstatin 40mg daily at night.   You were prescribed Hydrochlorothiazide 12.5mg as needed if you notice your blood pressure readings are high. Please take blood pressure readings three times a day.     PRINCIPAL DISCHARGE DIAGNOSIS  Diagnosis: Hypertensive urgency  Assessment and Plan of Treatment: Please continue Toprol (metoprolol succinate) 50 mg daily, Amiodarone 200mg daily, Aspirin 81mg daily, Plavix 75mg daily, Atorvstatin 40mg daily at night.   You were prescribed Hydrochlorothiazide 12.5mg as needed if you notice your blood pressure readings are high. Please take blood pressure readings three times a day.  Please follow up with CTS in 1 week regarding your right lower extremity wound. You were prescribed an antibiotic Keflex (cephalexin) 500mg two times a day for 5 days.

## 2025-04-07 NOTE — PROGRESS NOTE ADULT - SUBJECTIVE AND OBJECTIVE BOX
Chief complaint: Patient is a 59y old  Male who presents with a chief complaint of HTN (06 Apr 2025 17:40)    Interval history:    Review of systems: A complete 10-point review of systems was obtained and is negative except as stated in the interval history.    Vitals:  T(F): 97.6, Max: 98.2 (04-06 @ 15:52)  HR: 58 (58 - 69)  BP: 119/86 (119/76 - 148/90)  RR: 19 (18 - 21)  SpO2: 96% (94% - 99%)    Ins & outs:     04-06 @ 07:01  -  04-07 @ 07:00  --------------------------------------------------------  IN: 50 mL / OUT: 900 mL / NET: -850 mL      Weight trend:  Weight (kg): 115 (04-06)    Physical exam:  General: No apparent distress  HEENT: Anicteric sclera. Moist mucous membranes. JVP *** cm.   Cardiac: Regular rate and rhythm. No murmurs, rubs, or gallops.   Vascular: Symmetric radial pulses. Dorsalis pedis pulses palpable.   Respiratory: Normal effort. Bibasilar crackles. Clear to ascultation.   Abdomen: Soft, nontender. Audible bowel sounds.   Extremities: Warm with *** edema. No cyanosis or clubbing.   Skin: Warm and dry. No rash.   Neurologic: Grossly normal motor function.   Psychiatric: Oriented to person, place, and time.     Data reviewed:  - Telemetry:   - ECG (date***):   - TTE (date***):   - Chest x-ray (date***):   - Stress test:   - CCTA:  - Cardiac catheterization:  - Cardiac MRI:    - Labs:                        13.2   5.34  )-----------( 203      ( 07 Apr 2025 05:43 )             40.0     04-07    140  |  104  |  9[L]  ----------------------------<  104[H]  4.1   |  27  |  1.2    Ca    9.1      07 Apr 2025 05:43  Mg     1.8     04-07    TPro  7.5  /  Alb  4.0  /  TBili  0.3  /  DBili  x   /  AST  21  /  ALT  16  /  AlkPhos  72  04-06    PT/INR - ( 06 Apr 2025 10:12 )   PT: 12.10 sec;   INR: 1.02 ratio         PTT - ( 06 Apr 2025 10:12 )  PTT:32.1 sec        Triglycerides, Serum: 152 mg/dL (04-07-25 @ 05:43)  LDL Cholesterol Calculated: 133 mg/dL (04-07-25 @ 05:43)      Urinalysis Basic - ( 07 Apr 2025 05:43 )    Color: x / Appearance: x / SG: x / pH: x  Gluc: 104 mg/dL / Ketone: x  / Bili: x / Urobili: x   Blood: x / Protein: x / Nitrite: x   Leuk Esterase: x / RBC: x / WBC x   Sq Epi: x / Non Sq Epi: x / Bacteria: x        Medications:  aMIOdarone    Tablet 200 milliGRAM(s) Oral daily  aspirin enteric coated 81 milliGRAM(s) Oral daily  chlorhexidine 2% Cloths 1 Application(s) Topical <User Schedule>  clopidogrel Tablet 75 milliGRAM(s) Oral daily  folic acid 1 milliGRAM(s) Oral daily  heparin   Injectable 5000 Unit(s) SubCutaneous every 8 hours  magnesium sulfate  IVPB 2 Gram(s) IV Intermittent once  methocarbamol 750 milliGRAM(s) Oral every 8 hours  metoprolol tartrate 25 milliGRAM(s) Oral every 8 hours  pantoprazole    Tablet 40 milliGRAM(s) Oral before breakfast    Drips:    PRN:     Allergies    No Known Allergies    Intolerances      Assessment:      Problems discussed and associated plan:      Please contact me with any questions or concerns at [phone].

## 2025-04-07 NOTE — DISCHARGE NOTE PROVIDER - CARE PROVIDERS DIRECT ADDRESSES
[email or web address]

## 2025-04-07 NOTE — DISCHARGE NOTE PROVIDER - NSDCMRMEDTOKEN_GEN_ALL_CORE_FT
amiodarone 200 mg oral tablet: 1 tab(s) orally 2 times a day  aspirin 81 mg oral tablet: orally once a day  clopidogrel 75 mg oral tablet: 1 tab(s) orally once a day  folic acid 1 mg oral tablet: 1 tab(s) orally once a day  MELOXICAM 15 MG TABLET:   methocarbamol 750 mg oral tablet: 1 tab(s) orally every 8 hours  metoprolol tartrate 25 mg oral tablet: 1 tab(s) orally every 8 hours  pantoprazole 40 mg oral delayed release tablet: 1 tab(s) orally once a day   amiodarone 200 mg oral tablet: 1 tab(s) orally 2 times a day  aspirin 81 mg oral tablet: orally once a day  atorvastatin 40 mg oral tablet: 1 tab(s) orally once a day (at bedtime)  clopidogrel 75 mg oral tablet: 1 tab(s) orally once a day  folic acid 1 mg oral tablet: 1 tab(s) orally once a day  methocarbamol 750 mg oral tablet: 1 tab(s) orally every 8 hours  metoprolol succinate 50 mg oral tablet, extended release: 1 tab(s) orally once a day  pantoprazole 40 mg oral delayed release tablet: 1 tab(s) orally once a day   amiodarone 200 mg oral tablet: 1 tab(s) orally 2 times a day  aspirin 81 mg oral tablet: orally once a day  atorvastatin 40 mg oral tablet: 1 tab(s) orally once a day (at bedtime)  cephalexin 500 mg oral tablet: 1 tab(s) orally 2 times a day  clopidogrel 75 mg oral tablet: 1 tab(s) orally once a day  folic acid 1 mg oral tablet: 1 tab(s) orally once a day  hydroCHLOROthiazide 12.5 mg oral tablet: 1 tab(s) orally prn Please take as needed if blood pressure is high  methocarbamol 750 mg oral tablet: 1 tab(s) orally every 8 hours  metoprolol succinate 50 mg oral tablet, extended release: 1 tab(s) orally once a day  pantoprazole 40 mg oral delayed release tablet: 1 tab(s) orally once a day   amiodarone 200 mg oral tablet: 1 tab(s) orally 2 times a day  aspirin 81 mg oral tablet: orally once a day  atorvastatin 40 mg oral tablet: 1 tab(s) orally once a day (at bedtime)  cephalexin 500 mg oral tablet: 1 tab(s) orally 2 times a day  clopidogrel 75 mg oral tablet: 1 tab(s) orally once a day  folic acid 1 mg oral tablet: 1 tab(s) orally once a day  hydroCHLOROthiazide 12.5 mg oral tablet: 1 tab(s) orally prn Please take as needed if blood pressure is high  methocarbamol 750 mg oral tablet: 1 tab(s) orally every 8 hours  metoprolol succinate 50 mg oral tablet, extended release: 1 tab(s) orally once a day  mupirocin 2% topical ointment: Apply topically to affected area 2 times a day Apply to right lower extremity wound  pantoprazole 40 mg oral delayed release tablet: 1 tab(s) orally once a day   amiodarone 200 mg oral tablet: 1 tab(s) orally once a day  aspirin 81 mg oral tablet: orally once a day  atorvastatin 40 mg oral tablet: 1 tab(s) orally once a day (at bedtime)  cephalexin 500 mg oral tablet: 1 tab(s) orally 2 times a day  clopidogrel 75 mg oral tablet: 1 tab(s) orally once a day  folic acid 1 mg oral tablet: 1 tab(s) orally once a day  hydroCHLOROthiazide 12.5 mg oral tablet: 1 tab(s) orally prn Please take as needed if blood pressure is high  methocarbamol 750 mg oral tablet: 1 tab(s) orally every 8 hours  metoprolol succinate 50 mg oral tablet, extended release: 1 tab(s) orally once a day  mupirocin 2% topical ointment: Apply topically to affected area 2 times a day Apply to right lower extremity wound  pantoprazole 40 mg oral delayed release tablet: 1 tab(s) orally once a day

## 2025-04-07 NOTE — DISCHARGE NOTE NURSING/CASE MANAGEMENT/SOCIAL WORK - PATIENT PORTAL LINK FT
You can access the FollowMyHealth Patient Portal offered by [facility] by registering at the following website: [email or web address]. By joining [employer]’s FollowMyHealth portal, you will also be able to view your health information using other applications (apps) compatible with our system.

## 2025-04-08 ENCOUNTER — APPOINTMENT (OUTPATIENT)
Dept: CARDIOTHORACIC SURGERY | Facility: CLINIC | Age: 60
End: 2025-04-08

## 2025-04-08 VITALS
SYSTOLIC BLOOD PRESSURE: 135 MMHG | TEMPERATURE: 97.5 F | WEIGHT: 247 LBS | BODY MASS INDEX: 33.46 KG/M2 | DIASTOLIC BLOOD PRESSURE: 94 MMHG | HEIGHT: 72 IN | OXYGEN SATURATION: 97 % | RESPIRATION RATE: 16 BRPM | HEART RATE: 85 BPM

## 2025-04-08 DIAGNOSIS — Z95.1 PRESENCE OF AORTOCORONARY BYPASS GRAFT: ICD-10-CM

## 2025-04-08 PROCEDURE — 99024 POSTOP FOLLOW-UP VISIT: CPT

## 2025-04-08 RX ORDER — METOPROLOL TARTRATE 50 MG/1
50 TABLET ORAL DAILY
Refills: 0 | Status: ACTIVE | COMMUNITY

## 2025-04-08 RX ORDER — IBUPROFEN 400 MG/1
400 TABLET, FILM COATED ORAL EVERY 8 HOURS
Qty: 15 | Refills: 0 | Status: ACTIVE | COMMUNITY
Start: 2025-04-08 | End: 1900-01-01

## 2025-04-10 DIAGNOSIS — K21.9 GASTRO-ESOPHAGEAL REFLUX DISEASE WITHOUT ESOPHAGITIS: ICD-10-CM

## 2025-04-10 DIAGNOSIS — E66.9 OBESITY, UNSPECIFIED: ICD-10-CM

## 2025-04-10 DIAGNOSIS — I16.0 HYPERTENSIVE URGENCY: ICD-10-CM

## 2025-04-10 DIAGNOSIS — I25.10 ATHEROSCLEROTIC HEART DISEASE OF NATIVE CORONARY ARTERY WITHOUT ANGINA PECTORIS: ICD-10-CM

## 2025-04-10 DIAGNOSIS — Z87.442 PERSONAL HISTORY OF URINARY CALCULI: ICD-10-CM

## 2025-04-10 DIAGNOSIS — Z79.02 LONG TERM (CURRENT) USE OF ANTITHROMBOTICS/ANTIPLATELETS: ICD-10-CM

## 2025-04-10 DIAGNOSIS — Z79.82 LONG TERM (CURRENT) USE OF ASPIRIN: ICD-10-CM

## 2025-04-10 DIAGNOSIS — Z87.891 PERSONAL HISTORY OF NICOTINE DEPENDENCE: ICD-10-CM

## 2025-04-10 DIAGNOSIS — Z82.49 FAMILY HISTORY OF ISCHEMIC HEART DISEASE AND OTHER DISEASES OF THE CIRCULATORY SYSTEM: ICD-10-CM

## 2025-04-10 DIAGNOSIS — G89.29 OTHER CHRONIC PAIN: ICD-10-CM

## 2025-04-10 DIAGNOSIS — Z95.1 PRESENCE OF AORTOCORONARY BYPASS GRAFT: ICD-10-CM

## 2025-04-28 DIAGNOSIS — I48.91 UNSPECIFIED ATRIAL FIBRILLATION: ICD-10-CM

## 2025-04-28 RX ORDER — METOPROLOL SUCCINATE 50 MG/1
50 TABLET, EXTENDED RELEASE ORAL
Qty: 90 | Refills: 3 | Status: ACTIVE | COMMUNITY
Start: 2025-04-28 | End: 1900-01-01

## 2025-04-28 RX ORDER — APIXABAN 5 MG/1
5 TABLET, FILM COATED ORAL
Qty: 180 | Refills: 2 | Status: ACTIVE | COMMUNITY
Start: 2025-04-28 | End: 1900-01-01

## 2025-05-19 ENCOUNTER — APPOINTMENT (OUTPATIENT)
Dept: CARDIOLOGY | Facility: CLINIC | Age: 60
End: 2025-05-19
Payer: MEDICARE

## 2025-05-19 ENCOUNTER — NON-APPOINTMENT (OUTPATIENT)
Age: 60
End: 2025-05-19

## 2025-05-19 VITALS
HEART RATE: 61 BPM | SYSTOLIC BLOOD PRESSURE: 140 MMHG | BODY MASS INDEX: 34.06 KG/M2 | DIASTOLIC BLOOD PRESSURE: 93 MMHG | HEIGHT: 73 IN | WEIGHT: 257 LBS

## 2025-05-19 PROCEDURE — 99214 OFFICE O/P EST MOD 30 MIN: CPT

## 2025-05-19 RX ORDER — ATORVASTATIN CALCIUM 40 MG/1
40 TABLET, FILM COATED ORAL DAILY
Refills: 0 | Status: ACTIVE | COMMUNITY
Start: 2025-05-06

## 2025-06-10 ENCOUNTER — APPOINTMENT (OUTPATIENT)
Dept: ELECTROPHYSIOLOGY | Facility: CLINIC | Age: 60
End: 2025-06-10

## 2025-06-10 VITALS
BODY MASS INDEX: 33.13 KG/M2 | DIASTOLIC BLOOD PRESSURE: 80 MMHG | WEIGHT: 250 LBS | HEIGHT: 73 IN | HEART RATE: 61 BPM | SYSTOLIC BLOOD PRESSURE: 130 MMHG

## 2025-06-10 PROCEDURE — 93000 ELECTROCARDIOGRAM COMPLETE: CPT

## 2025-06-10 PROCEDURE — 99205 OFFICE O/P NEW HI 60 MIN: CPT

## 2025-07-18 PROBLEM — Z78.9 CAFFEINE USE: Status: ACTIVE | Noted: 2025-06-10

## 2025-07-24 ENCOUNTER — TRANSCRIPTION ENCOUNTER (OUTPATIENT)
Age: 60
End: 2025-07-24

## 2025-07-24 ENCOUNTER — APPOINTMENT (OUTPATIENT)
Dept: ELECTROPHYSIOLOGY | Facility: HOSPITAL | Age: 60
End: 2025-07-24

## 2025-07-24 ENCOUNTER — OUTPATIENT (OUTPATIENT)
Dept: OUTPATIENT SERVICES | Facility: HOSPITAL | Age: 60
LOS: 1 days | Discharge: HOME | End: 2025-07-24
Payer: MEDICARE

## 2025-07-24 DIAGNOSIS — Z98.890 OTHER SPECIFIED POSTPROCEDURAL STATES: Chronic | ICD-10-CM

## 2025-07-24 DIAGNOSIS — I48.91 UNSPECIFIED ATRIAL FIBRILLATION: ICD-10-CM

## 2025-07-24 DIAGNOSIS — Z90.89 ACQUIRED ABSENCE OF OTHER ORGANS: Chronic | ICD-10-CM

## 2025-07-24 DIAGNOSIS — Z90.49 ACQUIRED ABSENCE OF OTHER SPECIFIED PARTS OF DIGESTIVE TRACT: Chronic | ICD-10-CM

## 2025-07-24 PROCEDURE — 33285 INSJ SUBQ CAR RHYTHM MNTR: CPT

## 2025-07-24 PROCEDURE — C1764: CPT

## 2025-07-24 RX ORDER — CEPHALEXIN 250 MG/1
500 CAPSULE ORAL ONCE
Refills: 0 | Status: COMPLETED | OUTPATIENT
Start: 2025-07-24 | End: 2025-07-24

## 2025-07-24 RX ADMIN — CEPHALEXIN 500 MILLIGRAM(S): 250 CAPSULE ORAL at 14:56

## 2025-07-24 NOTE — ASU DISCHARGE PLAN (ADULT/PEDIATRIC) - ASU DC SPECIAL INSTRUCTIONSFT
Please keep the bandage dry and intact x 2 days.  You can remove the brown bandage on 7/26/25 and shower is ok after that.  Please keep steri strips intact and let them to fall off on their own in 1-2 weeks.  Please do not remove them forcefully.  Please avoid submerging under the water for 2 weeks.  Follow up appointment will be arranged in 1 mon

## 2025-07-24 NOTE — H&P ADULT - HISTORY OF PRESENT ILLNESS
61 y/o male with past medical history remarkable for hypertension, hyperlipidemia, nephrolithiasis, AAA without prior surgical intervention, obesity, smoking, chronic back pain, CAD post CABG 2/19/2025 ([facility], Dr. Regalado), post operative Atrial Fibrillation. He presents today in sinus rhythm on Amiodarone. He has no chest pain, no shortness of breath, no dyspnea on exertion, no orthopnea, no PND. He denies dizziness, lightheadedness and syncope. He has no exertional symptoms. He presents to EP lab for loop recorder implantation.      Cardiology Summary   ECG: (6/10/2025) ECG: sinus rhythm at 62 bpm, old inferior MI, poor R wave progression, non-sp T wave abnormalities   Echo: (4/7/2025) TTE: 1. Technically difficult and limited study. 2. Endocardial visualization was enhanced with intravenous echo contrast. 3. Left ventricular ejection fraction, by visual estimation, is 55 to 60%. 4. Spectral Doppler shows impaired relaxation pattern of left ventricular myocardial filling (Grade I diastolic dysfunction). 5. Aortic valve was not well visualized; appears calcified with grossly normal opening.   Cardiac Surg: (2/19/2025) CABG x4 (MENA to LAD, RSVG OM1, RSVG D1, RSVG RCA)

## 2025-07-24 NOTE — ASU DISCHARGE PLAN (ADULT/PEDIATRIC) - CARE PROVIDER_API CALL
Alexys Tidwell)  Clinical Cardiac Electrophysiology  [address] [place] [address], [address]  [place] [address]  Phone: [phone]  Fax: [phone]  Follow Up Time: 1 month

## 2025-07-24 NOTE — ASU DISCHARGE PLAN (ADULT/PEDIATRIC) - FINANCIAL ASSISTANCE
[facility] provides services at a reduced cost to those who are determined to be eligible through [facility]’s financial assistance program. Information regarding [facility]’s financial assistance program can be found by going to [email or web address] or by calling [phone].

## 2025-07-24 NOTE — H&P ADULT - NSHPPHYSICALEXAM_GEN_ALL_CORE
CONSTITUTIONAL: Well groomed, no apparent distress   RESP: CTA b/l, no WRR  CV: RRR, +S1S2, no MRG; no JVD; no peripheral edema  MSK: Normal gait; Normal ROM without pain, no spinal tenderness, normal muscle strength/tone  SKIN: No rashes or ulcers noted; no subcutaneous nodules or induration palpable  NEURO: CN II-XII intact; normal reflexes in upper and lower extremities, sensation intact in upper and lower extremities b/l to light touch   PSYCH: Appropriate insight/judgment; A+O x 3, mood and affect appropriate, recent/remote memory intact
on unit

## 2025-07-24 NOTE — PROGRESS NOTE ADULT - SUBJECTIVE AND OBJECTIVE BOX
Electrophysiology Brief Post-Op Note    I have personally seen and examined the patient.  I agree with the history and physical which I have reviewed and noted any changes below.  07-24-25 @ 15:31    PRE-OP DIAGNOSIS: Palpitations / AF  POST-OP DIAGNOSIS: Palpitations / AF    PROCEDURE: Loop Recorder Implant    Physician: Dr. Romero  Assistant: SUSHANT BOATENG    ESTIMATED BLOOD LOSS:  2    mL    ANESTHESIA TYPE:  [  ]General Anesthesia  [  ] Sedation  [X  ] Local/Regional    CONDITION  [  ] Critical  [  ] Serious  [  ]Fair  [ X ]Good    SPECIMENS REMOVED (IF APPLICABLE):  none    IMPLANTS (IF APPLICABLE)  Loop Recorder ([employer]) S/N: [id number]    FINDINGS  PLAN OF CARE  - May remove bandaid in 2 days  - May shower in 2 days  Follow up in EP office in 1 mon

## 2025-07-24 NOTE — H&P ADULT - NSICDXPASTSURGICALHX_GEN_ALL_CORE_FT
Additional Safety/Bands:
PAST SURGICAL HISTORY:  H/O cardiac catheterization     H/O shoulder surgery     H/O Spinal surgery     History of cholecystectomy     History of tonsillectomy

## 2025-07-24 NOTE — H&P ADULT - ASSESSMENT
59 y/o male with past medical history remarkable for hypertension, hyperlipidemia, nephrolithiasis, AAA without prior surgical intervention, obesity, smoking, chronic back pain, CAD post CABG 2/19/2025 ([facility], Dr. Regalado), post operative Atrial Fibrillation. He presents today in sinus rhythm on Amiodarone. He has no chest pain, no shortness of breath, no dyspnea on exertion, no orthopnea, no PND. He denies dizziness, lightheadedness and syncope. He has no exertional symptoms. He presents to EP lab for loop recorder implantation.      - Keflex 500 mg 1 dose ordered for prophylaxis  - consent is obtained. Procedure, risks, and benefits were discussed. All questions answered.   - loop recorder implantation today  - follow up in 1 mon for a wound check

## 2025-07-24 NOTE — ASU DISCHARGE PLAN (ADULT/PEDIATRIC) - PAIN MANAGEMENT
[address]                  [address] [id number],  [facility]                                OPERATIVE REPORT    PATIENT NAME: Isai Cox                    :        1949  MED REC NO:   [id number]                            ROOM:       8204  ACCOUNT NO:   [de-identified]                           ADMIT DATE: 2023  PROVIDER:     Oliver Cooney MD    DATE OF PROCEDURE:  2023    PREOPERATIVE DIAGNOSIS:  Left ureteral calculus. POSTOPERATIVE DIAGNOSES:  Left ureteral calculus plus significant  bladder outlet obstruction from prostatic enlargement. OPERATIONS PERFORMED:  Cystopanendoscopy, retrograde pyelogram,  ureteroscopy, laser lithotripsy, stent placement, Martin catheter  placement. SURGEON:  Oliver Cooney MD    ESTIMATED BLOOD LOSS:  Less than 50 mL. OPERATIVE PROCEDURE:  With the patient in the lithotomy position under  satisfactory sedation, the genitalia were prepped and draped in a  sterile manner. A 21-[place] panendoscope passed easily through the  pendulous and membranous urethra. There were no strictures or lesions  seen. The prostatic fossa showed significant lateral lobe obstruction  with a very high riding bladder neck. Upon entering the bladder, the  bladder was trabeculated with cellule and early diverticular formation. The trigone was symmetrical.  Ureteral orifices of normal configuration  and location. Retrograde pyelogram on his left side revealed a dilated  ureter down to the distal ureter where there was a filling defect in the  ureter. A wire was inserted following which an [facility] ureteroscope was  passed over the wire. This was advanced through the urethra up the  ureter until a yellow stone was encountered, in fact, there were two  stones in the area.   These were treated with a 200-micron laser fiber  with excellent fragmentation following which a 24-cm 6-[place] double-J  stent was placed, one
Take over the counter pain medication

## 2025-07-30 DIAGNOSIS — I48.91 UNSPECIFIED ATRIAL FIBRILLATION: ICD-10-CM

## 2025-08-18 ENCOUNTER — NON-APPOINTMENT (OUTPATIENT)
Age: 60
End: 2025-08-18

## 2025-08-18 ENCOUNTER — APPOINTMENT (OUTPATIENT)
Dept: ELECTROPHYSIOLOGY | Facility: CLINIC | Age: 60
End: 2025-08-18
Payer: MEDICARE

## 2025-08-18 VITALS
WEIGHT: 250 LBS | HEART RATE: 64 BPM | BODY MASS INDEX: 33.13 KG/M2 | DIASTOLIC BLOOD PRESSURE: 100 MMHG | SYSTOLIC BLOOD PRESSURE: 160 MMHG | HEIGHT: 73 IN

## 2025-08-18 DIAGNOSIS — Z45.09 ENCOUNTER FOR ADJUSTMENT AND MANAGEMENT OF OTHER CARDIAC DEVICE: ICD-10-CM

## 2025-08-18 DIAGNOSIS — Z48.89 ENCOUNTER FOR OTHER SPECIFIED SURGICAL AFTERCARE: ICD-10-CM

## 2025-08-18 DIAGNOSIS — I48.91 UNSPECIFIED ATRIAL FIBRILLATION: ICD-10-CM

## 2025-08-18 DIAGNOSIS — I10 ESSENTIAL (PRIMARY) HYPERTENSION: ICD-10-CM

## 2025-08-18 PROCEDURE — 99024 POSTOP FOLLOW-UP VISIT: CPT

## 2025-08-18 PROCEDURE — 93291 INTERROG DEV EVAL SCRMS IP: CPT

## 2025-08-18 PROCEDURE — 99213 OFFICE O/P EST LOW 20 MIN: CPT

## 2025-08-18 PROCEDURE — 93000 ELECTROCARDIOGRAM COMPLETE: CPT | Mod: 59

## 2025-08-22 RX ORDER — OLMESARTAN MEDOXOMIL 40 MG/1
40 TABLET, FILM COATED ORAL
Refills: 0 | Status: ACTIVE | COMMUNITY
Start: 2025-06-26